# Patient Record
Sex: FEMALE | Race: WHITE | NOT HISPANIC OR LATINO | Employment: OTHER | URBAN - METROPOLITAN AREA
[De-identification: names, ages, dates, MRNs, and addresses within clinical notes are randomized per-mention and may not be internally consistent; named-entity substitution may affect disease eponyms.]

---

## 2017-01-23 ENCOUNTER — RADIANT APPOINTMENT (OUTPATIENT)
Dept: GENERAL RADIOLOGY | Facility: CLINIC | Age: 47
End: 2017-01-23
Attending: PEDIATRICS
Payer: COMMERCIAL

## 2017-01-23 ENCOUNTER — OFFICE VISIT (OUTPATIENT)
Dept: ORTHOPEDICS | Facility: CLINIC | Age: 47
End: 2017-01-23
Payer: COMMERCIAL

## 2017-01-23 VITALS
WEIGHT: 149 LBS | SYSTOLIC BLOOD PRESSURE: 122 MMHG | DIASTOLIC BLOOD PRESSURE: 66 MMHG | HEIGHT: 69 IN | BODY MASS INDEX: 22.07 KG/M2

## 2017-01-23 DIAGNOSIS — S89.91XA RIGHT KNEE INJURY, INITIAL ENCOUNTER: Primary | ICD-10-CM

## 2017-01-23 DIAGNOSIS — S83.411A SPRAIN OF MEDIAL COLLATERAL LIGAMENT OF RIGHT KNEE, INITIAL ENCOUNTER: ICD-10-CM

## 2017-01-23 DIAGNOSIS — M54.50 ACUTE RIGHT-SIDED LOW BACK PAIN WITHOUT SCIATICA: ICD-10-CM

## 2017-01-23 DIAGNOSIS — S89.91XA RIGHT KNEE INJURY, INITIAL ENCOUNTER: ICD-10-CM

## 2017-01-23 PROCEDURE — 73562 X-RAY EXAM OF KNEE 3: CPT | Mod: RT | Performed by: PEDIATRICS

## 2017-01-23 PROCEDURE — 99214 OFFICE O/P EST MOD 30 MIN: CPT | Performed by: PEDIATRICS

## 2017-01-23 NOTE — PROGRESS NOTES
Sports Medicine Clinic Visit    PCP: Magy Franco    Kalina Schreiber is a 46 year old female who is seen  as a self referral presenting with right knee pain.  Patient fell while skiing 1/19/17.  She is not sure exactly how she fell, but she does know she twisted her right knee.  Pain on the medial aspect of her right knee.  Does feel like her knee is unstable.  Pain with walking.    She does also have right lower back pain.  Does feel like a muscle soreness with twisting.  Pain began on Friday, day after fall skiing.  Denies any pain down her legs.    Points near PSIS. Pain mainly with twist. Soreness with position change, e.g., sit to stand, but once moving not as bad.  Oswestry Disability Index 8.89%    **  Injury: fall while skiing. Was on flat run. Does not really know why she fell.  Marshall an initial burning at knee, medial aspect. Was able to finish run (flat), but then finished for the day.  At rest no pain. Able to walk on it. Notes unable to fully straighten due to pulling medial knee.   With pivot/twist does get some pain, more when on the leg.  Medial knee swelling. No bruising.    Location of Pain: right medial knee  Duration of Pain: 4 day(s)  Rating of Pain at worst: 0/10  Rating of Pain Currently: 0/10  Symptoms are better with: Nothing  Symptoms are worse with: walking, lateral motions  Additional Features:   Positive: instability   Negative: swelling, bruising, popping, grinding, catching, locking, paresthesias, numbness, weakness, pain in other joints and systemic symptoms  Other evaluation and/or treatments so far consists of: Ice  Prior History of related problems: denies    Social History: desk job    Review of Systems  Musculoskeletal: as above  Remainder of review of systems is negative including constitutional, CV, pulmonary, GI, Skin and Neurologic except as noted in HPI or medical history.    Past Medical History   Diagnosis Date     Allergic rhinitis due to other allergen       Solitary cyst of breast      Twin pregnancy, delivered      Past Surgical History   Procedure Laterality Date     Surgical history of -        T&A     Surgical history of -        Csection     Surgical history of -        breast augmentation     Laser surgery of eye  8/21/2008     Both eyes     Colonoscopy  2/23/2012     Procedure:COLONOSCOPY; Colonoscopy  ; Surgeon:BOB TY; Location:[facility]     Colonoscopy N/A 5/6/2016     Procedure: COLONOSCOPY;  Surgeon: Markus Grsosman MD;  Location: [facility]     Esophagoscopy, gastroscopy, duodenoscopy (egd), combined N/A 5/6/2016     Procedure: COMBINED ESOPHAGOSCOPY, GASTROSCOPY, DUODENOSCOPY (EGD);  Surgeon: Markus Grossman MD;  Location: [facility]     Family History   Problem Relation Age of Onset     Alcohol/Drug Mother      CANCER Mother      skin, COD: lung, liver, bone cancer     Melanoma Mother      DIABETES Mother      Post transplant onset     Alcohol/Drug Father      Hypertension Father      CANCER Maternal Grandmother      lung/liver     Asthma Maternal Grandmother      CANCER Maternal Grandfather      skin     Cancer - colorectal Maternal Grandfather      Melanoma Maternal Grandfather      Asthma No family hx of      C.A.D. No family hx of      CEREBROVASCULAR DISEASE No family hx of      Breast Cancer No family hx of      Prostate Cancer No family hx of      Social History     Social History     Marital Status:      Spouse Name: N/A     Number of Children: N/A     Years of Education: N/A     Occupational History      Self     Social History Main Topics     Smoking status: Never Smoker      Smokeless tobacco: Never Used     Alcohol Use: Yes      Comment: 1-2 drinks per week     Drug Use: No     Sexual Activity:     Partners: Male     Birth Control/ Protection: Surgical      Comment: vas     Other Topics Concern     Parent/Sibling W/ Cabg, Mi Or Angioplasty Before 65f 55m? No     Social History Narrative    Currently doing office work     ".    2 kids.      Surrogate for twins       Objective  /66 mmHg  Ht 5' 8.5\" (1.74 m)  Wt 149 lb (67.586 kg)  BMI 22.32 kg/m2    GENERAL APPEARANCE: healthy, alert and no distress   GAIT: antalgic  SKIN: no suspicious lesions or rashes  NEURO: Normal strength and tone, mentation intact and speech normal  PSYCH:  mentation appears normal and affect normal/bright  HEENT: no scleral icterus  CV: no extremity edema  RESP: nonlabored breathing    Right Knee exam    ROM:        Flexion 90 degrees       Extension 5 degrees       Range of motion limited by pain, stiffness  Passive to flexion 110, extension full, pain end of motion    Inspection:       no visible ecchymosis       effusion noted trace to mild    Skin:       no visible deformities       well perfused       capillary refill brisk    Patellar Motion:        Normal patellar tracking noted through range of motion    Tender:        along MCL    Non Tender:         remainder of knee area    Special Tests:        neg (-) Adriane       neg (-) Lachman       neg (-) anterior drawer       neg (-) posterior drawer       neg (-) varus       positive (+) valgus at 30 for laxity, mild pain; mild positive at 0 deg for laxity, with pain       + medial pain with forced extension    Evaluation of ipsilateral kinetic chain  Able to raise against gravity without pain or problem        Low Back:  Inspection: There is no visible deformity in the back.  Range of Motion: Flexion ; extension ; lateral bending right , left ; rotation right , left grossly full   Mild right pain with right lateral bending, left lateral bending  Palpation: There is no tenderness over the midline, mild right tenderness over the paraspinous muscles; + tender right SI joint.  Strength:  Grossly symmetric strength with hip flexion, knee extension, ankle dorsiflexion and plantarflexion as well as dorsiflexion of the great toe.  Sensation: grossly intact throughout lower extremities  Straight " Leg Raise: Right neg; Left neg  Normal skin, vascular and lymphatic exam.         Radiology  Visualized radiographs of right knee obtained today, and reviewed the images with the patient.  Impression: Bilateral Aldana and axial views of the knees demonstrate no acute osseous abnormality. Joint spaces appear preserved.  Visualized radiographs of right knee obtained today, and reviewed the images with the patient.  Impression: Single lateral view of the right knee demonstrates no acute osseous abnormality.      Assessment:  1. Right knee injury, initial encounter    2. Sprain of medial collateral ligament of right knee, initial encounter    3. Acute right-sided low back pain without sciatica    right low back pain favor strain, vs possible SI joint source.  MCL at least grade 2.    Plan:  Discussed the assessment with the patient and her . Isolated MCL sprains generally heal on their own over time.  We discussed the following treatment options: symptom treatment, activity modification/rest, imaging, rehab, potential for improvement with time and support for the affected area. Following discussion, plan:  Topical Treatments: Ice  Over the counter medication: Patient's preferred OTC medication as directed on packaging. May use NSAIDs.  Plain films of the knee reviewed. Discussed potential for MRI of knee, but with isolated MCL sprain, not required at this time.  Activity Modification: discussed  Rehab: Home Exercise Program for back, stretching reviewed; also with ROM for knee  Physical Therapy: encouraged for knee, though she prefers HEP to start  Medical Equipment: discussed bracing, hinge brace for support; she has a hinge brace at home already, prefers to use that; may consider crutches for ambulation if desired, not required  Follow up: 3-4 weeks, sooner prn.  Questions answered. The patient indicates understanding of these issues and agrees with the plan.    Rodney Beauchamp DO,  [facility]            Disclaimer: This note consists of symbols derived from keyboarding, dictation and/or voice recognition software. As a result, there may be errors in the script that have gone undetected. Please consider this when interpreting information found in this chart.

## 2017-01-23 NOTE — Clinical Note
Kalina HARTMANN was seen in [facility] for her MCL sprain. Bracing, rehab. Please see copy of the chart note for additional details. Thanks.

## 2017-01-23 NOTE — NURSING NOTE
"Chief Complaint   Patient presents with     Musculoskeletal Problem     right knee pain       Initial /66 mmHg  Ht 5' 8.5\" (1.74 m)  Wt 149 lb (67.586 kg)  BMI 22.32 kg/m2 Estimated body mass index is 22.32 kg/(m^2) as calculated from the following:    Height as of this encounter: 5' 8.5\" (1.74 m).    Weight as of this encounter: 149 lb (67.586 kg).  BP completed using cuff size: regular    Patient was given home exercise program at the direction of TACHO GARIBAY  that included the following exercise(s) :    Exercise(s) knee slides, AAROM seated flexion, quad sets, TKE quad sets/ cat camel, knee to chest, knees to chest, rotation    Repititions: 5    Times per day: 1-2    Patient demonstrated understanding of and the ability to perform these exercises. Patient was seen for 5 minutes to provide this home exercise program. Patient was directed to discontinue any exercises that cause pain, and to call the clinic if any questions.    "

## 2017-01-26 ENCOUNTER — RADIANT APPOINTMENT (OUTPATIENT)
Dept: GENERAL RADIOLOGY | Facility: CLINIC | Age: 47
End: 2017-01-26
Attending: FAMILY MEDICINE
Payer: COMMERCIAL

## 2017-01-26 ENCOUNTER — OFFICE VISIT (OUTPATIENT)
Dept: ORTHOPEDICS | Facility: CLINIC | Age: 47
End: 2017-01-26
Payer: COMMERCIAL

## 2017-01-26 VITALS
DIASTOLIC BLOOD PRESSURE: 72 MMHG | BODY MASS INDEX: 22.07 KG/M2 | SYSTOLIC BLOOD PRESSURE: 122 MMHG | WEIGHT: 149 LBS | HEART RATE: 87 BPM | HEIGHT: 69 IN

## 2017-01-26 DIAGNOSIS — S92.355A CLOSED NONDISPLACED FRACTURE OF FIFTH METATARSAL BONE OF LEFT FOOT, INITIAL ENCOUNTER: Primary | ICD-10-CM

## 2017-01-26 DIAGNOSIS — S99.922A INJURY OF LEFT FOOT, INITIAL ENCOUNTER: ICD-10-CM

## 2017-01-26 PROCEDURE — 99214 OFFICE O/P EST MOD 30 MIN: CPT | Performed by: FAMILY MEDICINE

## 2017-01-26 PROCEDURE — 73630 X-RAY EXAM OF FOOT: CPT | Mod: LT

## 2017-01-26 NOTE — PROGRESS NOTES
"Kalina Schreiber  :  1970  DOS: 2017  MRN: [id number]    Sports Medicine Clinic Visit    PCP: Magy Franco    Kalina Schreiber is a 46 year old female who is seen as AIC patient presenting with left foot injury.  Rolled her foot on vacuum hose when going down 2 stairs, heard a snap.    Injury: this morning.  Pain located over lateral foot, nonradiating.  Additional Features:  Positive: swelling and bruising.  Symptoms are better with Nothing.  Symptoms are worse with: unable to bear much weight.  Other evaluation and/or treatments so far consists of: No Treatment tried to date.  Prior imaging: No recent imaging completed.  Prior History of related problems: none    Social History: desk job    Review of Systems  Musculoskeletal: as above  Remainder of review of systems is negative including constitutional, CV, pulmonary, GI, Skin and Neurologic except as noted in HPI or medical history.    Past Medical History   Diagnosis Date     Allergic rhinitis due to other allergen      Solitary cyst of breast      Twin pregnancy, delivered      Past Surgical History   Procedure Laterality Date     Surgical history of -        T&A     Surgical history of -        Csection     Surgical history of -        breast augmentation     Laser surgery of eye  2008     Both eyes     Colonoscopy  2012     Procedure:COLONOSCOPY; Colonoscopy  ; Surgeon:BOB TY; Location:[facility]     Colonoscopy N/A 2016     Procedure: COLONOSCOPY;  Surgeon: Markus Grossman MD;  Location: [facility]     Esophagoscopy, gastroscopy, duodenoscopy (egd), combined N/A 2016     Procedure: COMBINED ESOPHAGOSCOPY, GASTROSCOPY, DUODENOSCOPY (EGD);  Surgeon: Markus Grossman MD;  Location: [facility]     Objective  /72 mmHg  Pulse 87  Ht 5' 8.5\" (1.74 m)  Wt 149 lb (67.586 kg)  BMI 22.32 kg/m2    General: healthy, alert and in no distress    HEENT: no scleral icterus or conjunctival erythema   Skin: no " suspicious lesions or rash. No jaundice.   CV: regular rhythm by palpation, 2+ distal pulses, no pedal edema    Resp: normal respiratory effort without conversational dyspnea   Psych: normal mood and affect    Gait: antalgic, appropriate coordination and balance   Neuro: normal light touch sensory exam of the extremities. Motor strength as noted below     Left Ankle/Foot Exam:    Inspection:       ecchymosis noted lateral midfoot       edema noted lateral midfoot    Foot inspection:       no deformity noted    ROM:        limited inversion passively       limited eversion actively    Tender:       proximal 5th metatarsal    Non-Tender:       remainder of foot and ankle       lateral malleolus       lateral ankle ligaments       deltoid ligament       posterior edge of lateral malleolus       dorsal tibiotalar joint       tarsal navicular       medial malleolus       distal tibiofibular joint       proximal 1st and 2nd intermetatarsal ligament       tibialis posterior tendon, posterior to medial malleolus    Skin:       well perfused       capillary refill less than 2 seconds    Special Tests:       neg (-) anterior drawer        neg (-) talar tilt     Gait:       Avoids WB    Proprioception:        normal      Radiology:  XR images independently visualized and reviewed with patient today in clinic  Proximal 5th MT styloid avulsion fracture  No other acute findings appreciated, will follow final radiology read    Assessment:  1. Closed nondisplaced fracture of fifth metatarsal bone of left foot, initial encounter        Plan:  Discussed the assessment with the patient.  Reviewed expectations for recovery from fracture, good prognosis  No associated l;ateral ankle sprain appreciated  Will advance HEP next visit if possible  No cast boot needed  Crutches prn over the next one week  F/u next week, consider XR based on clinical progress  Soft dressing and post-op shoe provided, firm soled shoe ok as well  Has cam walker  at home and can use in short term if more comfortable, but will not likely need long term  Advance WBAT  VIRGIL reviewed  Home handouts provided and supportive care reviewed  All questions were answered today  Contact us with additional questions or concerns  Signs and sx of concern reviewed      Rodney Lester DO, CAJOSH  Primary Care Sports Medicine  [facility]

## 2017-02-03 ENCOUNTER — OFFICE VISIT (OUTPATIENT)
Dept: ORTHOPEDICS | Facility: CLINIC | Age: 47
End: 2017-02-03
Payer: COMMERCIAL

## 2017-02-03 ENCOUNTER — RADIANT APPOINTMENT (OUTPATIENT)
Dept: GENERAL RADIOLOGY | Facility: CLINIC | Age: 47
End: 2017-02-03
Attending: FAMILY MEDICINE
Payer: COMMERCIAL

## 2017-02-03 VITALS
HEIGHT: 69 IN | SYSTOLIC BLOOD PRESSURE: 125 MMHG | WEIGHT: 149 LBS | BODY MASS INDEX: 22.07 KG/M2 | DIASTOLIC BLOOD PRESSURE: 60 MMHG

## 2017-02-03 DIAGNOSIS — S92.355D CLOSED NONDISPLACED FRACTURE OF FIFTH METATARSAL BONE OF LEFT FOOT WITH ROUTINE HEALING, SUBSEQUENT ENCOUNTER: ICD-10-CM

## 2017-02-03 DIAGNOSIS — S92.355D CLOSED NONDISPLACED FRACTURE OF FIFTH METATARSAL BONE OF LEFT FOOT WITH ROUTINE HEALING, SUBSEQUENT ENCOUNTER: Primary | ICD-10-CM

## 2017-02-03 PROCEDURE — 99213 OFFICE O/P EST LOW 20 MIN: CPT | Performed by: FAMILY MEDICINE

## 2017-02-03 PROCEDURE — 73630 X-RAY EXAM OF FOOT: CPT | Mod: LT

## 2017-02-03 NOTE — NURSING NOTE
"Initial /60 mmHg  Ht 5' 8.5\" (1.74 m)  Wt 149 lb (67.586 kg)  BMI 22.32 kg/m2 Estimated body mass index is 22.32 kg/(m^2) as calculated from the following:    Height as of this encounter: 5' 8.5\" (1.74 m).    Weight as of this encounter: 149 lb (67.586 kg). .    David Moses ATC  "

## 2017-02-03 NOTE — PROGRESS NOTES
Kalina Schreiber  :  1970  DOS: 2/3/2017  MRN: [id number]    Sports Medicine Clinic Visit    PCP: Magy Franco    Kalina Schreiber is a 46 year old female who is seen as AIC patient presenting with left foot injury.  Rolled her foot on vacuum hose when going down 2 stairs, heard a snap.    Injury: this morning.  Pain located over lateral foot, nonradiating.  Additional Features:  Positive: swelling and bruising.  Symptoms are better with Nothing.  Symptoms are worse with: unable to bear much weight.  Other evaluation and/or treatments so far consists of: No Treatment tried to date.  Prior imaging: No recent imaging completed.  Prior History of related problems: none    Social History: desk job    Interim History February 3, 2017  Kalina Schreiber is now 1 week out from injury.  Since last visit on 2017 patient has decreased pain and swelling.  Has been WB at home without crutches, very mild pain only.  Still using post op shoe for comfort.  R knee MCL sprain also feeling better, was evaluated elseware.  No new sx of concern.    Review of Systems  Musculoskeletal: as above  Remainder of review of systems is negative including constitutional, CV, pulmonary, GI, Skin and Neurologic except as noted in HPI or medical history.    Past Medical History   Diagnosis Date     Allergic rhinitis due to other allergen      Solitary cyst of breast      Twin pregnancy, delivered      Past Surgical History   Procedure Laterality Date     Surgical history of -        T&A     Surgical history of -        Csection     Surgical history of -        breast augmentation     Laser surgery of eye  2008     Both eyes     Colonoscopy  2012     Procedure:COLONOSCOPY; Colonoscopy  ; Surgeon:BOB TY; Location:[facility]     Colonoscopy N/A 2016     Procedure: COLONOSCOPY;  Surgeon: Markus Grossman MD;  Location: [facility]     Esophagoscopy, gastroscopy, duodenoscopy (egd), combined N/A 2016      "Procedure: COMBINED ESOPHAGOSCOPY, GASTROSCOPY, DUODENOSCOPY (EGD);  Surgeon: Markus Grossman MD;  Location: [facility]     Objective  /60 mmHg  Ht 5' 8.5\" (1.74 m)  Wt 149 lb (67.586 kg)  BMI 22.32 kg/m2    General: healthy, alert and in no distress    HEENT: no scleral icterus or conjunctival erythema   Skin: no suspicious lesions or rash. No jaundice.   CV: regular rhythm by palpation, 2+ distal pulses, no pedal edema    Resp: normal respiratory effort without conversational dyspnea   Psych: normal mood and affect    Gait: antalgic, appropriate coordination and balance   Neuro: normal light touch sensory exam of the extremities. Motor strength as noted below     Left Ankle/Foot Exam:    Inspection:       ecchymosis noted lateral midfoot       edema noted lateral midfoot improved    Foot inspection:       no deformity noted    ROM:        limited inversion passively       limited eversion actively    Tender:       proximal 5th metatarsal    Non-Tender:       remainder of foot and ankle       lateral malleolus       lateral ankle ligaments       deltoid ligament       posterior edge of lateral malleolus       dorsal tibiotalar joint       tarsal navicular       medial malleolus       distal tibiofibular joint       proximal 1st and 2nd intermetatarsal ligament       tibialis posterior tendon, posterior to medial malleolus    Skin:       well perfused       capillary refill less than 2 seconds    Special Tests:       neg (-) anterior drawer        neg (-) talar tilt     Gait:       Avoids WB    Proprioception:        normal    Radiology:  Repeat XR images independently visualized and reviewed with patient today in clinic  Proximal 5th MT styloid avulsion fracture without change in position, seems to have even improved displacement/alignment   No other acute findings appreciated, will follow final radiology read    LEFT FOOT THREE OR MORE VIEWS 1/26/2017 9:26 AM       HISTORY: Unspecified injury of left " foot, initial encounter.     COMPARISON: None.                                                                       IMPRESSION: There is an acute appearing intra-articular transverse  fracture at the proximal fifth metatarsal base with about 1.5 mm  medial displacement of the distal fracture fragment and less than 1 mm  articular surface step-off. No other bony abnormalities.    Assessment:  1. Closed nondisplaced fracture of fifth metatarsal bone of left foot with routine healing, subsequent encounter        Plan:  Discussed the assessment with the patient.  Reviewed expectations for recovery from fracture, good prognosis  On target for 4-6 weeks to full healing based on progress so far  No associated lateral ankle sprain appreciated  Will advance HEP, reviewed in detail for foot and knee  No cast boot needed  Crutches prn only, use of one crutch in r arm reviewed  F/u 3 weeks, doubt we would need XR based on clinical progress  Soft dressing and post-op shoe can still be used, firm soled shoe ok as well  Has cam walker at home and can use prn, but has not needed so far  Continue to advance WBAT  VIRGIL reviewed  Supportive care reviewed  All questions were answered today  Contact us with additional questions or concerns  Signs and sx of concern reviewed      Rodney Lester DO, LIZETTE  Primary Care Sports Medicine  [facility]

## 2017-02-23 ENCOUNTER — OFFICE VISIT (OUTPATIENT)
Dept: ORTHOPEDICS | Facility: CLINIC | Age: 47
End: 2017-02-23
Payer: COMMERCIAL

## 2017-02-23 ENCOUNTER — RADIANT APPOINTMENT (OUTPATIENT)
Dept: GENERAL RADIOLOGY | Facility: CLINIC | Age: 47
End: 2017-02-23
Attending: PHYSICIAN ASSISTANT
Payer: COMMERCIAL

## 2017-02-23 VITALS
SYSTOLIC BLOOD PRESSURE: 129 MMHG | WEIGHT: 149 LBS | DIASTOLIC BLOOD PRESSURE: 81 MMHG | HEIGHT: 69 IN | HEART RATE: 116 BPM | BODY MASS INDEX: 22.07 KG/M2

## 2017-02-23 DIAGNOSIS — G89.29 CHRONIC LEFT SHOULDER PAIN: ICD-10-CM

## 2017-02-23 DIAGNOSIS — M25.512 CHRONIC LEFT SHOULDER PAIN: Primary | ICD-10-CM

## 2017-02-23 DIAGNOSIS — M25.512 CHRONIC LEFT SHOULDER PAIN: ICD-10-CM

## 2017-02-23 DIAGNOSIS — G89.29 CHRONIC LEFT SHOULDER PAIN: Primary | ICD-10-CM

## 2017-02-23 PROCEDURE — 73030 X-RAY EXAM OF SHOULDER: CPT | Mod: LT

## 2017-02-23 PROCEDURE — 99213 OFFICE O/P EST LOW 20 MIN: CPT | Performed by: PHYSICIAN ASSISTANT

## 2017-02-23 NOTE — NURSING NOTE
"Chief Complaint   Patient presents with     Shoulder left       Initial /81  Pulse 116  Ht 5' 8.5\" (1.74 m)  Wt 149 lb (67.6 kg)  BMI 22.33 kg/m2 Estimated body mass index is 22.33 kg/(m^2) as calculated from the following:    Height as of this encounter: 5' 8.5\" (1.74 m).    Weight as of this encounter: 149 lb (67.6 kg).  Medication Reconciliation: complete    "

## 2017-02-23 NOTE — MR AVS SNAPSHOT
After Visit Summary   2/23/2017    Kalina Schreiber    MRN: [id number]           Patient Information     Date Of Birth          1970        Visit Information        Provider Department      2/23/2017 11:40 AM Megan Nj PA-C [facility] [place]        Today's Diagnoses     Chronic left shoulder pain    -  1      Care Instructions    Plan:  Observe and monitor symptoms  Rehab: Physical Therapy: [facility] - [phone]  Follow up: 6 weeks as needed.  Please call sooner if interested in intra-articular injection.    If you have any further questions for your physician or physician s care team you can call [phone] and use option 3 to leave a voice message. Calls received during business hours will be returned same day.          Follow-ups after your visit        Additional Services     [facility] PT, HAND, AND CHIROPRACTIC REFERRAL       **This order will print in the [facility] Scheduling Office**    Physical Therapy, Hand Therapy and Chiropractic Care are available through:    *[facility]  *[facility]  *[facility]    Call one number to schedule at any of the above locations: [phone].    Your provider has referred you to: Physical Therapy at [facility] or [facility]    Indication/Reason for Referral: Shoulder Pain  Onset of Illness: chronic  Therapy Orders: Evaluate and Treat  Special Programs: None  Special Request: shoulder mobilization, strengthening    Susan Davis      Additional Comments for the Therapist or Chiropractor: labral pathology    Please be aware that coverage of these services is subject to the terms and limitations of your health insurance plan.  Call member services at your health plan with any benefit or coverage questions.      Please bring the following to your appointment:    *Your personal calendar for scheduling future appointments  *Comfortable clothing                  Who  "to contact     If you have questions or need follow up information about today's clinic visit or your schedule please contact [facility] directly at [phone].  Normal or non-critical lab and imaging results will be communicated to you by MyChart, letter or phone within 4 business days after the clinic has received the results. If you do not hear from us within 7 days, please contact the clinic through MyChart or phone. If you have a critical or abnormal lab result, we will notify you by phone as soon as possible.  Submit refill requests through CRMnext or call your pharmacy and they will forward the refill request to us. Please allow 3 business days for your refill to be completed.          Additional Information About Your Visit        CRMnext Information     CRMnext gives you secure access to your electronic health record. If you see a primary care provider, you can also send messages to your care team and make appointments. If you have questions, please call your primary care clinic.  If you do not have a primary care provider, please call [phone] and they will assist you.        Care EveryWhere ID     This is your Care EveryWhere ID. This could be used by other organizations to access your [facility] medical records  [id number]        Your Vitals Were     Pulse Height BMI (Body Mass Index)             116 5' 8.5\" (1.74 m) 22.33 kg/m2          Blood Pressure from Last 3 Encounters:   02/23/17 129/81   02/03/17 125/60   01/26/17 122/72    Weight from Last 3 Encounters:   02/23/17 149 lb (67.6 kg)   02/03/17 149 lb (67.6 kg)   01/26/17 149 lb (67.6 kg)              We Performed the Following     [facility] PT, HAND, AND CHIROPRACTIC REFERRAL        Primary Care Provider Office Phone # Fax #    Magy PENNY Lang [facility] [phone]       [facility] [address] [address]        Thank you!     Thank you for choosing [email or web address]  for your care. Our goal is always to provide you with excellent care. Hearing back from our patients is one way we can continue to improve our services. Please take a few minutes to complete the written survey that you may receive in the mail after your visit with us. Thank you!             Your Updated Medication List - Protect others around you: Learn how to safely use, store and throw away your medicines at www.disposemymeds.org.          This list is accurate as of: 2/23/17 12:32 PM.  Always use your most recent med list.                   Brand Name Dispense Instructions for use    ADVIL 200 MG capsule   Generic drug:  ibuprofen      Take 200 mg by mouth every 4 hours as needed.       ferrous sulfate 325 (65 FE) MG tablet    IRON    180 tablet    Take 1 tablet (325 mg) by mouth 2 times daily       fexofenadine 180 MG tablet    ALLEGRA    30 tablet    Take 1 tablet (180 mg) by mouth daily       NASAL SPRAY NA      Nasoquort 1 spray in each nostril daily when needed       order for DME     1 Device    Equipment being ordered: Cast shoe       SUMAtriptan 25 MG tablet    IMITREX    18 tablet    Take 1-2 tablets (25-50 mg) by mouth at onset of headache for migraine May repeat dose in 2 hours.  Do not exceed 200 mg in 24 hours       UNKNOWN TO PATIENT      Allergy Eye Drops, 1 drop in each eye daily

## 2017-02-23 NOTE — PATIENT INSTRUCTIONS
Plan:  Observe and monitor symptoms  Rehab: Physical Therapy: [facility] - [phone]  Follow up: 6 weeks as needed.  Please call sooner if interested in intra-articular injection.    If you have any further questions for your physician or physician s care team you can call [phone] and use option 3 to leave a voice message. Calls received during business hours will be returned same day.

## 2017-02-23 NOTE — PROGRESS NOTES
Sports Medicine Clinic Visit    PCP: Magy Franco    Kalina Schreiber is a 46 year old female who is seen  as a self referral presenting with left shoulder pain.  Right hand dominant.    Injury: over 1 year, no injury    Location of Pain: deep posterior  Duration of Pain: 1+ years   Rating of Pain at worst: 8/10  Rating of Pain Currently:0/10  Symptoms are better with: activity avoidance  Symptoms are worse with: lifting things away from body, reaching up  Additional Features:   Positive: none  Other evaluation and/or treatments so far consists of: none  Prior History of related problems: chronic    Social History: desk job    Review of Systems  Musculoskeletal: as above  Remainder of review of systems is negative including constitutional, CV, pulmonary, GI, Skin and Neurologic except as noted in HPI or medical history.    Family history, medical history and surgical history have all been discussed with patient and appended to medical chart below.    Past Medical History   Diagnosis Date     Allergic rhinitis due to other allergen      Solitary cyst of breast      Twin pregnancy, delivered      Past Surgical History   Procedure Laterality Date     Surgical history of -        T&A     Surgical history of -        Csection     Surgical history of -        breast augmentation     Laser surgery of eye  8/21/2008     Both eyes     Colonoscopy  2/23/2012     Procedure:COLONOSCOPY; Colonoscopy  ; Surgeon:BOB TY; Location:[facility]     Colonoscopy N/A 5/6/2016     Procedure: COLONOSCOPY;  Surgeon: Markus Grossman MD;  Location: [facility]     Esophagoscopy, gastroscopy, duodenoscopy (egd), combined N/A 5/6/2016     Procedure: COMBINED ESOPHAGOSCOPY, GASTROSCOPY, DUODENOSCOPY (EGD);  Surgeon: Markus Grossman MD;  Location: [facility]     Family History   Problem Relation Age of Onset     Alcohol/Drug Mother      CANCER Mother      skin, COD: lung, liver, bone cancer     Melanoma Mother      DIABETES  "Mother      Post transplant onset     Alcohol/Drug Father      Hypertension Father      CANCER Maternal Grandmother      lung/liver     Asthma Maternal Grandmother      CANCER Maternal Grandfather      skin     Cancer - colorectal Maternal Grandfather      Melanoma Maternal Grandfather      Asthma No family hx of      C.A.D. No family hx of      CEREBROVASCULAR DISEASE No family hx of      Breast Cancer No family hx of      Prostate Cancer No family hx of      Objective  /81  Pulse 116  Ht 5' 8.5\" (1.74 m)  Wt 149 lb (67.6 kg)  BMI 22.33 kg/m2  Constitutional:well-developed, well-nourished, and in no distress.   Cardiovascular: Intact distal pulses.    Neurological: alert. Gait normal  Skin: Skin is warm and dry.   Psychiatric: Mood and affect normal.   Respiratory: unlabored, speaks in full sentences  Hematologic/Lymphatic/Immunologic: neg lymphadenopathy, neg lymphedema    Exam:  Left Shoulder exam    ROM:        forward flexion 120        abduction 160       internal rotation lumbar spine upper       external rotation 60+ bilaterally    Tender:        posterior shoulder - joint line    Non Tender:       remainder of shoulder    Strength:        abduction 5/5       internal rotation 5/5       external rotation 5/5       adduction 5/5    Impingement testing:        neg (-) Neer       neg (-) Mcdonnell       neg (-) empty can       Positive Speed's test for labral pathology    Stability testing:       neg (-) posterior compression    Skin:        no visible deformities       well perfused       capillary refill brisk    Sensation:        normal sensation over shoulder and upper extremity         Radiology:  Study Result   XR SHOULDER LT G/E 3 VW 2/23/2017 11:50 AM      HISTORY: Pain in left shoulder, Other chronic pain         IMPRESSION: Negative exam.     MILTON HARRINGTON MD       I have personally reviewed images with patient    Assessment:  1. Chronic left shoulder pain    2.  Possible labral " pathology    Plan:  Discussed the assessment with the patient.  Topical Treatments: Ice, Heat or Topical Analgesics  Over the counter medication: Acetaminophen (Tylenol) 1000mg every 6 hours with food (Maximum of 3000mg/day)  Naproxen (Aleve) maximum of 440mg two times a day with food  MR arthrogram may be required in future to further evaluate labrum  Observe and monitor symptoms  Activity Modification: as tolerated  Rehab: Physical Therapy: [facility] - [phone]  Follow up: 6-8 weeks  Apprised patient that if discomfort progresses that she can consider intra-articular steroid injection therapy - she is not interested in scheduling this today            Megan Nj PA-C  [facility]  [facility]      Disclaimer: This note consists of symbols derived from keyboarding, dictation and/or voice recognition software. As a result, there may be errors in the script that have gone undetected. Please consider this when interpreting information found in this chart.

## 2017-02-24 ENCOUNTER — THERAPY VISIT (OUTPATIENT)
Dept: PHYSICAL THERAPY | Facility: CLINIC | Age: 47
End: 2017-02-24
Payer: COMMERCIAL

## 2017-02-24 DIAGNOSIS — M25.512 CHRONIC LEFT SHOULDER PAIN: Primary | ICD-10-CM

## 2017-02-24 DIAGNOSIS — G89.29 CHRONIC LEFT SHOULDER PAIN: Primary | ICD-10-CM

## 2017-02-24 PROCEDURE — 97110 THERAPEUTIC EXERCISES: CPT | Mod: GP | Performed by: PHYSICAL THERAPIST

## 2017-02-24 PROCEDURE — 97161 PT EVAL LOW COMPLEX 20 MIN: CPT | Mod: GP | Performed by: PHYSICAL THERAPIST

## 2017-02-24 NOTE — PROGRESS NOTES
"[facility] Initial Evaluation      Subjective:    Kalina Schreiber is a 46 year old female with a right shoulder condition.  Condition occurred with:  Unknown cause.  Condition occurred: for unknown reasons.    Pt states has been dealing with pain for a year or more. Noticed discomfort reaching overhead.  Took a break from weight lifting and didn't notice any difference while not being active and felt worse upon return.  Referred to PT 02/23/2017.    Patient reports pain:  In the joint and posterior.    Pain is described as aching and is intermittent and reported as 8/10.   Worse during: activity dependent, not time dependent.  Exacerbated by: planking, donning jacket, lifting, reaching overhead. Relieved by: avoiding the above.  Since onset symptoms are gradually worsening.  Special tests:  X-ray (in chart).      General health as reported by patient is excellent.  Pertinent medical history includes:  History of fractures and anemia.  Medical allergies: yes (adhesive).  Other surgeries include:  Other (c sections).  Current medications:  None as reported by the patient.  Current occupation is managerial.  Patient is working in normal job without restrictions.  Primary job tasks include:  Prolonged sitting.    Barriers include:  None as reported by the patient.    Red flags:  None as reported by the patient.    Pt states her goal is to \"be painfree.\"                Objective:    Standing Alignment:    Cervical/Thoracic:  Forward head  Shoulder/UE:  Rounded shoulders (symmetric inferior scapular angle winging B)                                       Shoulder Evaluation:  ROM:  AROM:    Flexion:  Left:  120 positive    Right:  175  Extension: Left: 79 positiveRight: 85  Abduction:  Left: 169 negative   Right:  169    Internal Rotation:  Left:  T4 negative    Right:  T7  External Rotation:  Left:  79 positive    Right:  83                PROM:    Flexion:  Left:  175 negative      "                               Strength:    Flexion: Left:4/5    Pain: +    Right: 5/5      Pain:  -  Extension:  Left: 4+/5     Pain:-    Right: 5/5     Pain:-  Abduction:  Left: 4+/5   Pain:-    Right: 5/5      Pain:-  Adduction:  Left: 5/5     Pain:-    Right: 5/5      Pain:-  Internal Rotation:  Left:4+/5      Pain:-    Right: 5/5      Pain:-  External Rotation:   Left:4+/5      Pain:+   Right:5/5      Pain:-      Horizontal Adduction:  Right:/5     Pain:-  Elbow Flexion:  Left:5/5      Pain:-    Right:5/5      Pain:-  Elbow Extension:  Left:5/5      Pain:-    Right:5/5      Pain:-  Stability Testing:      Left shoulder stability negative testing:  Apprehension; Relocation; Load and shift anterior and Load and shift posterior    Right shoulder stability negative testing:  Apprehension; Relocation; Load and shift anterior and Load and shift posterior  Special Tests:    Left shoulder positive for the following special tests:  Labral (positive active compression in position 1 but not 2)  Left shoulder negative for the following special tests:  Impingement and Rotator cuff tear    Right shoulder negative for the following special tests:Labral and Impingement  Palpation:  Palpation assessed shoulder: tender to palpation only over posterior joint line L.      Mobility Tests:    Glenohumeral anterior left:  Hypermobile  Glenohumeral anterior right:  Hypermobile  Glenohumeral posterior left:  Hypermobile  Glenohumeral posterior right:  Hypermobile  Glenohumeral inferior left:  Hypermobile  Glenohumeral inferior right:  Hypermobile      Scapulothoracic left:  Hypermobile  Scapulothoracic right:  Hypermobile                                       General     ROS    Assessment/Plan:      Patient is a 46 year old female with left side shoulder complaints.    Patient has the following significant findings with corresponding treatment plan.                Diagnosis 1:  L shoulder pain with underlying hypermobility  Pain -   hot/cold therapy  Decreased strength - therapeutic exercise and therapeutic activities  Impaired muscle performance - neuro re-education  Decreased function - therapeutic activities  Impaired posture - neuro re-education    Therapy Evaluation Codes:   1) History comprised of:   Personal factors that impact the plan of care:      Time since onset of symptoms.    Comorbidity factors that impact the plan of care are:      None.     Medications impacting care: None.  2) Examination of Body Systems comprised of:   Body structures and functions that impact the plan of care:      Shoulder.   Activity limitations that impact the plan of care are:      Dressing and Lifting.  3) Clinical presentation characteristics are:   Stable/Uncomplicated.  4) Decision-Making    Low complexity using standardized patient assessment instrument and/or measureable assessment of functional outcome.  Cumulative Therapy Evaluation is: Low complexity.    Previous and current functional limitations:  (See Goal Flow Sheet for this information)    Short term and Long term goals: (See Goal Flow Sheet for this information)     Communication ability:  Patient appears to be able to clearly communicate and understand verbal and written communication and follow directions correctly.  Treatment Explanation - The following has been discussed with the patient:   RX ordered/plan of care  Anticipated outcomes  Possible risks and side effects  This patient would benefit from PT intervention to resume normal activities.   Rehab potential is good.    Frequency:  2 X a month, once daily  Duration:  for 2 months  Discharge Plan:  Achieve all LTG.  Independent in home treatment program.  Reach maximal therapeutic benefit.    Please refer to the daily flowsheet for treatment today, total treatment time and time spent performing 1:1 timed codes.

## 2017-02-24 NOTE — MR AVS SNAPSHOT
After Visit Summary   2/24/2017    Kalina Schreiber    MRN: [id number]           Patient Information     Date Of Birth          1970        Visit Information        Provider Department      2/24/2017 10:45 AM Malcolm Rosen PT [facility] [place] [facility]        Today's Diagnoses     Chronic left shoulder pain    -  1       Follow-ups after your visit        Your next 10 appointments already scheduled     Mar 10, 2017 10:45 AM CST   [facility] Extremity with Malcolm Rosen PT   [facility] [place] [facility] ([facility] [place])    [address]  [address]  [place] [id number] [phone]              Who to contact     If you have questions or need follow up information about today's clinic visit or your schedule please contact [facility] [place] [facility] directly at [phone].  Normal or non-critical lab and imaging results will be communicated to you by CardFlighthart, letter or phone within 4 business days after the clinic has received the results. If you do not hear from us within 7 days, please contact the clinic through CardFlighthart or phone. If you have a critical or abnormal lab result, we will notify you by phone as soon as possible.  Submit refill requests through Xeebel or call your pharmacy and they will forward the refill request to us. Please allow 3 business days for your refill to be completed.          Additional Information About Your Visit        MyChart Information     Xeebel gives you secure access to your electronic health record. If you see a primary care provider, you can also send messages to your care team and make appointments. If you have questions, please call your primary care clinic.  If you do not have a primary care provider, please call [phone] and they will assist you.        Care EveryWhere ID     This is your Care EveryWhere ID. This could be used by other organizations to access your [facility]  records  [id number]         Blood Pressure from Last 3 Encounters:   02/23/17 129/81   02/03/17 125/60   01/26/17 122/72    Weight from Last 3 Encounters:   02/23/17 67.6 kg (149 lb)   02/03/17 67.6 kg (149 lb)   01/26/17 67.6 kg (149 lb)              We Performed the Following     PT Eval, Low Complexity (81631)     Therapeutic Exercises        Primary Care Provider Office Phone # Fax #    Magy Plata PENNY Garcia [facility] [phone]       [facility] [address] [address]        Thank you!     Thank you for choosing [facility]  for your care. Our goal is always to provide you with excellent care. Hearing back from our patients is one way we can continue to improve our services. Please take a few minutes to complete the written survey that you may receive in the mail after your visit with us. Thank you!             Your Updated Medication List - Protect others around you: Learn how to safely use, store and throw away your medicines at www.disposemymeds.org.          This list is accurate as of: 2/24/17 11:49 AM.  Always use your most recent med list.                   Brand Name Dispense Instructions for use    ADVIL 200 MG capsule   Generic drug:  ibuprofen      Take 200 mg by mouth every 4 hours as needed.       ferrous sulfate 325 (65 FE) MG tablet    IRON    180 tablet    Take 1 tablet (325 mg) by mouth 2 times daily       fexofenadine 180 MG tablet    ALLEGRA    30 tablet    Take 1 tablet (180 mg) by mouth daily       NASAL SPRAY NA      Nasoquort 1 spray in each nostril daily when needed       order for DME     1 Device    Equipment being ordered: Cast shoe       SUMAtriptan 25 MG tablet    IMITREX    18 tablet    Take 1-2 tablets (25-50 mg) by mouth at onset of headache for migraine May repeat dose in 2 hours.  Do not exceed 200 mg in 24 hours       UNKNOWN TO PATIENT      Allergy Eye Drops, 1 drop in each eye daily

## 2017-03-10 ENCOUNTER — THERAPY VISIT (OUTPATIENT)
Dept: PHYSICAL THERAPY | Facility: CLINIC | Age: 47
End: 2017-03-10
Payer: COMMERCIAL

## 2017-03-10 DIAGNOSIS — M25.512 CHRONIC LEFT SHOULDER PAIN: ICD-10-CM

## 2017-03-10 DIAGNOSIS — G89.29 CHRONIC LEFT SHOULDER PAIN: ICD-10-CM

## 2017-03-10 PROCEDURE — 97112 NEUROMUSCULAR REEDUCATION: CPT | Mod: GP | Performed by: PHYSICAL THERAPIST

## 2017-03-10 PROCEDURE — 97110 THERAPEUTIC EXERCISES: CPT | Mod: GP | Performed by: PHYSICAL THERAPIST

## 2017-03-10 NOTE — PROGRESS NOTES
Subjective:    HPI                    Objective:    System    Physical Exam    General     ROS    Assessment/Plan:      SUBJECTIVE  Subjective: Pt reports is still sore and HEP is quite challenging.  Is surely not worse than when last seen.   Current Pain level: 6/10   Changes in function:  Yes (See Goal flowsheet attached for changes in current functional level)     Adverse reaction to treatment or activity:  None    OBJECTIVE  Objective: AROM L shoulder flexion 132, abduction 170, ER 82, IR T4; only discomfort with flexion.  No discomfort PROM L shoulder flexion 174.     ASSESSMENT  Kalina continues to require intervention to meet STG and LTG's: PT  Patient is progressing as expected.  Response to therapy has shown an improvement in  ROM   Progress made towards STG/LTG?  Yes (See Goal flowsheet attached for updates on achievement of STG and LTG)    PLAN  Current treatment program is being advanced to more complex exercises.    PTA/ATC plan:  N/A    Please refer to the daily flowsheet for treatment today, total treatment time and time spent performing 1:1 timed codes.

## 2017-03-10 NOTE — MR AVS SNAPSHOT
After Visit Summary   3/10/2017    Kalina Schreiber    MRN: [id number]           Patient Information     Date Of Birth          1970        Visit Information        Provider Department      3/10/2017 10:45 AM Malcolm Rosen PT [facility] [place] [facility]        Today's Diagnoses     Chronic left shoulder pain           Follow-ups after your visit        Your next 10 appointments already scheduled     Mar 31, 2017 10:05 AM CDT   NAYELY Extremity with Malcolm Rosen PT   [facility] [place] [facility] ([facility] [place])    [address]  [address]  [place] [id number] [phone]              Who to contact     If you have questions or need follow up information about today's clinic visit or your schedule please contact [facility] directly at [phone].  Normal or non-critical lab and imaging results will be communicated to you by Kindfulhart, letter or phone within 4 business days after the clinic has received the results. If you do not hear from us within 7 days, please contact the clinic through Kindfulhart or phone. If you have a critical or abnormal lab result, we will notify you by phone as soon as possible.  Submit refill requests through [email or web address]¡ or call your pharmacy and they will forward the refill request to us. Please allow 3 business days for your refill to be completed.          Additional Information About Your Visit        MyChart Information     [email or web address] gives you secure access to your electronic health record. If you see a primary care provider, you can also send messages to your care team and make appointments. If you have questions, please call your primary care clinic.  If you do not have a primary care provider, please call [phone] and they will assist you.        Care EveryWhere ID     This is your Care EveryWhere ID. This could be used by other organizations to access your [facility]  records  [id number]         Blood Pressure from Last 3 Encounters:   02/23/17 129/81   02/03/17 125/60   01/26/17 122/72    Weight from Last 3 Encounters:   02/23/17 67.6 kg (149 lb)   02/03/17 67.6 kg (149 lb)   01/26/17 67.6 kg (149 lb)              We Performed the Following     Neuromuscular Re-Education     Therapeutic Exercises        Primary Care Provider Office Phone # Fax #    Magy Plata PENNY Garcia [phone]       [facility] [address] [address]        Thank you!     Thank you for choosing [facility]   for your care. Our goal is always to provide you with excellent care. Hearing back from our patients is one way we can continue to improve our services. Please take a few minutes to complete the written survey that you may receive in the mail after your visit with us. Thank you!             Your Updated Medication List - Protect others around you: Learn how to safely use, store and throw away your medicines at www.disposemymeds.org.          This list is accurate as of: 3/10/17 11:43 AM.  Always use your most recent med list.                   Brand Name Dispense Instructions for use    ADVIL 200 MG capsule   Generic drug:  ibuprofen      Take 200 mg by mouth every 4 hours as needed.       ferrous sulfate 325 (65 FE) MG tablet    IRON    180 tablet    Take 1 tablet (325 mg) by mouth 2 times daily       fexofenadine 180 MG tablet    ALLEGRA    30 tablet    Take 1 tablet (180 mg) by mouth daily       NASAL SPRAY NA      Nasoquort 1 spray in each nostril daily when needed       order for DME     1 Device    Equipment being ordered: Cast shoe       SUMAtriptan 25 MG tablet    IMITREX    18 tablet    Take 1-2 tablets (25-50 mg) by mouth at onset of headache for migraine May repeat dose in 2 hours.  Do not exceed 200 mg in 24 hours       UNKNOWN TO PATIENT      Allergy Eye Drops, 1 drop in each eye daily

## 2017-03-31 ENCOUNTER — THERAPY VISIT (OUTPATIENT)
Dept: PHYSICAL THERAPY | Facility: CLINIC | Age: 47
End: 2017-03-31
Payer: COMMERCIAL

## 2017-03-31 DIAGNOSIS — M25.512 CHRONIC LEFT SHOULDER PAIN: ICD-10-CM

## 2017-03-31 DIAGNOSIS — G89.29 CHRONIC LEFT SHOULDER PAIN: ICD-10-CM

## 2017-03-31 PROCEDURE — 97110 THERAPEUTIC EXERCISES: CPT | Mod: GP | Performed by: PHYSICAL THERAPIST

## 2017-03-31 PROCEDURE — 97140 MANUAL THERAPY 1/> REGIONS: CPT | Mod: GP | Performed by: PHYSICAL THERAPIST

## 2017-03-31 PROCEDURE — 97112 NEUROMUSCULAR REEDUCATION: CPT | Mod: GP | Performed by: PHYSICAL THERAPIST

## 2017-03-31 NOTE — MR AVS SNAPSHOT
After Visit Summary   3/31/2017    Kalina Schreiber    MRN: [id number]           Patient Information     Date Of Birth          1970        Visit Information        Provider Department      3/31/2017 10:05 AM Malcolm Rosen, PT [facility] [place] [facility]        Today's Diagnoses     Chronic left shoulder pain           Follow-ups after your visit        Your next 10 appointments already scheduled     Apr 07, 2017 12:40 PM CDT   PHYSICAL with PENNY David CNP   [facility]    [address] [address]   [phone]            Apr 21, 2017 11:25 AM CDT   NAYELY Extremity with Malcolm Rosen PT   [facility] [place] [facility] ([facility] [place])    [address]  [address]  [place] [address] [phone]              Who to contact     If you have questions or need follow up information about today's clinic visit or your schedule please contact [facility] directly at [phone].  Normal or non-critical lab and imaging results will be communicated to you by TrewCaphart, letter or phone within 4 business days after the clinic has received the results. If you do not hear from us within 7 days, please contact the clinic through XIFINt or phone. If you have a critical or abnormal lab result, we will notify you by phone as soon as possible.  Submit refill requests through Chattering Pixels or call your pharmacy and they will forward the refill request to us. Please allow 3 business days for your refill to be completed.          Additional Information About Your Visit        TrewCaphart Information     Chattering Pixels gives you secure access to your electronic health record. If you see a primary care provider, you can also send messages to your care team and make appointments. If you have questions, please call your primary care clinic.  If you do not have a primary care  provider, please call [phone] and they will assist you.        Care EveryWhere ID     This is your Care EveryWhere ID. This could be used by other organizations to access your [place] medical records  [id number]         Blood Pressure from Last 3 Encounters:   02/23/17 129/81   02/03/17 125/60   01/26/17 122/72    Weight from Last 3 Encounters:   02/23/17 67.6 kg (149 lb)   02/03/17 67.6 kg (149 lb)   01/26/17 67.6 kg (149 lb)              We Performed the Following     Manual Ther Tech, 1+Regions, EA 15 min     Neuromuscular Re-Education     Therapeutic Exercises        Primary Care Provider Office Phone # Fax #    Magy PENNY [facility] [phone]       [facility] [address] [id number]        Thank you!     Thank you for choosing [facility]   for your care. Our goal is always to provide you with excellent care. Hearing back from our patients is one way we can continue to improve our services. Please take a few minutes to complete the written survey that you may receive in the mail after your visit with us. Thank you!             Your Updated Medication List - Protect others around you: Learn how to safely use, store and throw away your medicines at www.disposemymeds.org.          This list is accurate as of: 3/31/17 11:51 AM.  Always use your most recent med list.                   Brand Name Dispense Instructions for use    ADVIL 200 MG capsule   Generic drug:  ibuprofen      Take 200 mg by mouth every 4 hours as needed.       ferrous sulfate 325 (65 FE) MG tablet    IRON    180 tablet    Take 1 tablet (325 mg) by mouth 2 times daily       fexofenadine 180 MG tablet    ALLEGRA    30 tablet    Take 1 tablet (180 mg) by mouth daily       NASAL SPRAY NA      Nasoquort 1 spray in each nostril daily when needed       order for DME     1 Device    Equipment being ordered: Cast shoe       SUMAtriptan 25 MG tablet    IMITREX     18 tablet    Take 1-2 tablets (25-50 mg) by mouth at onset of headache for migraine May repeat dose in 2 hours.  Do not exceed 200 mg in 24 hours       UNKNOWN TO PATIENT      Allergy Eye Drops, 1 drop in each eye daily

## 2017-03-31 NOTE — PROGRESS NOTES
Subjective:    HPI                    Objective:    System    Physical Exam    General     ROS    Assessment/Plan:      SUBJECTIVE  Subjective: Pt reports she still notices difficulty raising the arm forward and not sure if is progressing.   Current Pain level: 6/10   Changes in function:  Yes (See Goal flowsheet attached for changes in current functional level)     Adverse reaction to treatment or activity:  None    OBJECTIVE  Objective: AROM / PROM L shoulder flexion 153/170, abduction 168/DNT, ER 75/DNT, IR C7/ DNT.  Discomfort with resisted flexion but not abduction, ER, IR.  Positive active compression test.     ASSESSMENT  Kalina continues to require intervention to meet STG and LTG's: PT  Patient is progressing as expected.  Response to therapy has shown an improvement in  ROM   Progress made towards STG/LTG?  Yes (See Goal flowsheet attached for updates on achievement of STG and LTG)    PLAN  Excellent response to caudal glides in clinic.  Pt to incorporate these into HEP.  F/u as scheduled for reassessment and determination of further course of care.    PTA/ATC plan:  N/A    Please refer to the daily flowsheet for treatment today, total treatment time and time spent performing 1:1 timed codes.

## 2017-04-21 ENCOUNTER — THERAPY VISIT (OUTPATIENT)
Dept: PHYSICAL THERAPY | Facility: CLINIC | Age: 47
End: 2017-04-21
Payer: COMMERCIAL

## 2017-04-21 DIAGNOSIS — G89.29 CHRONIC LEFT SHOULDER PAIN: ICD-10-CM

## 2017-04-21 DIAGNOSIS — M25.512 CHRONIC LEFT SHOULDER PAIN: ICD-10-CM

## 2017-04-21 PROCEDURE — 97110 THERAPEUTIC EXERCISES: CPT | Mod: GP | Performed by: PHYSICAL THERAPIST

## 2017-04-21 NOTE — PROGRESS NOTES
Subjective:    HPI                    Objective:    System    Physical Exam    General     ROS    Assessment/Plan:      PROGRESS  REPORT    Progress reporting period is from 02/24/2017 to today.       SUBJECTIVE  Subjective: Pt reports she doesn't necessarily have the sharp pain as was initially present in PT but feels that a general ache is more prevalent.  Overall still having issues that don't seem to be progressing well.    Current Pain level: 6/10.     Initial Pain level: 8/10.   Changes in function:  Yes (See Goal flowsheet attached for changes in current functional level)  Adverse reaction to treatment or activity: None    OBJECTIVE  Objective: AROM L shoulder flexion 159, abduction 167, ER 83, IR C7.  Positive active compression in both positions.  Negative clunk, crank.     ASSESSMENT/PLAN  Updated problem list and treatment plan: Diagnosis 1:  L shoulder pain -- see plan below  STG/LTGs have been met or progress has been made towards goals:  Yes (See Goal flow sheet completed today.)  Assessment of Progress: The patient's progress has plateaued.  Self Management Plans:  Patient is independent in a home treatment program.  I have re-evaluated this patient and find that the nature, scope, duration and intensity of the therapy is appropriate for the medical condition of the patient.  Kalina continues to require the following intervention to meet STG and LTG's: see plan below    Recommendations:  Pt has made good ROM gains but not necessarily sustained symptomatic gains.  Pt has HEP in place and will continue.  Anticipate f/u with referring provider for further evaluation if pt does not progress further.  Pt agrees further PT on hold at this point.    Please refer to the daily flowsheet for treatment today, total treatment time and time spent performing 1:1 timed codes.

## 2017-04-21 NOTE — MR AVS SNAPSHOT
After Visit Summary   4/21/2017    Kalina Schreiber    MRN: [id number]           Patient Information     Date Of Birth          1970        Visit Information        Provider Department      4/21/2017 11:25 AM Malcolm Rosen PT [facility] [place]        Today's Diagnoses     Chronic left shoulder pain           Follow-ups after your visit        Who to contact     If you have questions or need follow up information about today's clinic visit or your schedule please contact [facility] [place] directly at [phone].  Normal or non-critical lab and imaging results will be communicated to you by Capseohart, letter or phone within 4 business days after the clinic has received the results. If you do not hear from us within 7 days, please contact the clinic through Markkitt or phone. If you have a critical or abnormal lab result, we will notify you by phone as soon as possible.  Submit refill requests through Codbod Technologies or call your pharmacy and they will forward the refill request to us. Please allow 3 business days for your refill to be completed.          Additional Information About Your Visit        MyChart Information     Codbod Technologies gives you secure access to your electronic health record. If you see a primary care provider, you can also send messages to your care team and make appointments. If you have questions, please call your primary care clinic.  If you do not have a primary care provider, please call [phone] and they will assist you.        Care EveryWhere ID     This is your Care EveryWhere ID. This could be used by other organizations to access your [place] medical records  [id number]         Blood Pressure from Last 3 Encounters:   02/23/17 129/81   02/03/17 125/60   01/26/17 122/72    Weight from Last 3 Encounters:   02/23/17 67.6 kg (149 lb)   02/03/17 67.6 kg (149 lb)   01/26/17 67.6 kg (149 lb)              We Performed the Following      Therapeutic Exercises        Primary Care Provider Office Phone # Fax #    PENNY David [phone]       [address] [address]        Thank you!     Thank you for choosing [facility]  for your care. Our goal is always to provide you with excellent care. Hearing back from our patients is one way we can continue to improve our services. Please take a few minutes to complete the written survey that you may receive in the mail after your visit with us. Thank you!             Your Updated Medication List - Protect others around you: Learn how to safely use, store and throw away your medicines at www.disposemymeds.org.          This list is accurate as of: 4/21/17  1:05 PM.  Always use your most recent med list.                   Brand Name Dispense Instructions for use    ADVIL 200 MG capsule   Generic drug:  ibuprofen      Take 200 mg by mouth every 4 hours as needed.       ferrous sulfate 325 (65 FE) MG tablet    IRON    180 tablet    Take 1 tablet (325 mg) by mouth 2 times daily       fexofenadine 180 MG tablet    ALLEGRA    30 tablet    Take 1 tablet (180 mg) by mouth daily       NASAL SPRAY NA      Nasoquort 1 spray in each nostril daily when needed       order for DME     1 Device    Equipment being ordered: Cast shoe       SUMAtriptan 25 MG tablet    IMITREX    18 tablet    Take 1-2 tablets (25-50 mg) by mouth at onset of headache for migraine May repeat dose in 2 hours.  Do not exceed 200 mg in 24 hours       UNKNOWN TO PATIENT      Allergy Eye Drops, 1 drop in each eye daily

## 2017-05-12 ENCOUNTER — TRANSFERRED RECORDS (OUTPATIENT)
Dept: HEALTH INFORMATION MANAGEMENT | Facility: CLINIC | Age: 47
End: 2017-05-12

## 2017-06-20 PROBLEM — M25.512 CHRONIC LEFT SHOULDER PAIN: Status: RESOLVED | Noted: 2017-02-24 | Resolved: 2017-06-20

## 2017-06-20 PROBLEM — G89.29 CHRONIC LEFT SHOULDER PAIN: Status: RESOLVED | Noted: 2017-02-24 | Resolved: 2017-06-20

## 2017-06-21 NOTE — PROGRESS NOTES
See plan 04/21.  Have not heard from pt since.  There is a scanned report from [facility] in chart but appears to be a partial record from 05/12.  Consider PT note dated 04/21 to serve as final summary.

## 2017-08-15 ENCOUNTER — TRANSFERRED RECORDS (OUTPATIENT)
Dept: HEALTH INFORMATION MANAGEMENT | Facility: CLINIC | Age: 47
End: 2017-08-15

## 2017-08-22 ENCOUNTER — TRANSFERRED RECORDS (OUTPATIENT)
Dept: HEALTH INFORMATION MANAGEMENT | Facility: CLINIC | Age: 47
End: 2017-08-22

## 2017-08-24 ENCOUNTER — TRANSFERRED RECORDS (OUTPATIENT)
Dept: HEALTH INFORMATION MANAGEMENT | Facility: CLINIC | Age: 47
End: 2017-08-24

## 2017-08-28 ENCOUNTER — OFFICE VISIT (OUTPATIENT)
Dept: FAMILY MEDICINE | Facility: CLINIC | Age: 47
End: 2017-08-28
Payer: COMMERCIAL

## 2017-08-28 ENCOUNTER — HOSPITAL ENCOUNTER (OUTPATIENT)
Dept: MAMMOGRAPHY | Facility: CLINIC | Age: 47
Discharge: HOME | End: 2017-08-28
Attending: NURSE PRACTITIONER | Admitting: NURSE PRACTITIONER
Payer: COMMERCIAL

## 2017-08-28 VITALS
HEART RATE: 67 BPM | DIASTOLIC BLOOD PRESSURE: 83 MMHG | OXYGEN SATURATION: 98 % | WEIGHT: 144 LBS | BODY MASS INDEX: 20.62 KG/M2 | SYSTOLIC BLOOD PRESSURE: 117 MMHG | HEIGHT: 70 IN

## 2017-08-28 DIAGNOSIS — Z12.31 VISIT FOR SCREENING MAMMOGRAM: ICD-10-CM

## 2017-08-28 DIAGNOSIS — G43.109 MIGRAINE WITH AURA AND WITHOUT STATUS MIGRAINOSUS, NOT INTRACTABLE: ICD-10-CM

## 2017-08-28 DIAGNOSIS — Z01.419 ENCOUNTER FOR GYNECOLOGICAL EXAMINATION WITHOUT ABNORMAL FINDING: Primary | ICD-10-CM

## 2017-08-28 PROCEDURE — G0145 SCR C/V CYTO,THINLAYER,RESCR: HCPCS | Performed by: NURSE PRACTITIONER

## 2017-08-28 PROCEDURE — 87624 HPV HI-RISK TYP POOLED RSLT: CPT | Performed by: NURSE PRACTITIONER

## 2017-08-28 PROCEDURE — G0202 SCR MAMMO BI INCL CAD: HCPCS

## 2017-08-28 PROCEDURE — 99396 PREV VISIT EST AGE 40-64: CPT | Performed by: NURSE PRACTITIONER

## 2017-08-28 RX ORDER — SUMATRIPTAN 25 MG/1
25-50 TABLET, FILM COATED ORAL
Qty: 18 TABLET | Refills: 1 | Status: SHIPPED | OUTPATIENT
Start: 2017-08-28 | End: 2020-12-02

## 2017-08-28 NOTE — NURSING NOTE
"Initial /83 (BP Location: Right arm, Patient Position: Chair, Cuff Size: Adult Regular)  Pulse 67  Ht 5' 9.5\" (1.765 m)  Wt 144 lb (65.3 kg)  SpO2 98%  BMI 20.96 kg/m2 Estimated body mass index is 20.96 kg/(m^2) as calculated from the following:    Height as of this encounter: 5' 9.5\" (1.765 m).    Weight as of this encounter: 144 lb (65.3 kg). .    Estrellita Otero    "

## 2017-08-28 NOTE — PROGRESS NOTES
SUBJECTIVE:   CC: Kalina Schreiber is an 46 year old woman who presents for preventive health visit.     Healthy Habits:    Do you get at least three servings of calcium containing foods daily (dairy, green leafy vegetables, etc.)? yes and no, taking calcium and/or vitamin D supplement: no    Amount of exercise or daily activities, outside of work: 5 day(s) per week    Problems taking medications regularly No    Medication side effects: No    Have you had an eye exam in the past two years? yes    Do you see a dentist twice per year? yes    Do you have sleep apnea, excessive snoring or daytime drowsiness?no        Medication Followup of Imitrex    Taking Medication as prescribed: yes    Side Effects:  None    Medication Helping Symptoms:  yes         Today's PHQ-2 Score:   PHQ-2 ( 1999 [employer]) 8/27/2017 4/4/2017   Q1: Little interest or pleasure in doing things 0 -   Q2: Feeling down, depressed or hopeless 0 -   PHQ-2 Score 0 -   Q1: Little interest or pleasure in doing things Not at all Not at all   Q2: Feeling down, depressed or hopeless Not at all Not at all   PHQ-2 Score 0 0         Abuse: Current or Past(Physical, Sexual or Emotional)- No  Do you feel safe in your environment - Yes  Social History   Substance Use Topics     Smoking status: Never Smoker     Smokeless tobacco: Never Used     Alcohol use Yes      Comment: 1-2 drinks per week     The patient does not drink >3 drinks per day nor >7 drinks per week.    Reviewed orders with patient.  Reviewed health maintenance and updated orders accordingly - Yes          Pertinent mammograms are reviewed under the imaging tab.    History of abnormal Pap smear: YES - updated in Problem List and Health Maintenance accordingly. Once prior to children, all normal since    Reviewed and updated as needed this visit by clinical staff  Meds         Reviewed and updated as needed this visit by Provider              ROS:C: NEGATIVE for fever, chills, change in weight  I:  "NEGATIVE for worrisome rashes, moles or lesions  E: NEGATIVE for vision changes or irritation  ENT: NEGATIVE for ear, mouth and throat problems  R: NEGATIVE for significant cough or SOB  B: NEGATIVE for masses, tenderness or discharge  CV: NEGATIVE for chest pain, palpitations or peripheral edema  GI: NEGATIVE for nausea, abdominal pain, heartburn, or change in bowel habits  : NEGATIVE for unusual urinary or vaginal symptoms. Periods are regular.  M: NEGATIVE for significant arthralgias or myalgia  N: NEGATIVE for weakness, dizziness or paresthesias  P: NEGATIVE for changes in mood or affect    OBJECTIVE:   /83 (BP Location: Right arm, Patient Position: Chair, Cuff Size: Adult Regular)  Pulse 67  Ht 5' 9.5\" (1.765 m)  Wt 144 lb (65.3 kg)  LMP 08/28/2017  SpO2 98%  BMI 20.96 kg/m2  EXAM:  GENERAL: healthy, alert and no distress  EYES: Eyes grossly normal to inspection, PERRL and conjunctivae and sclerae normal  HENT: ear canals and TM's normal, nose and mouth without ulcers or lesions  NECK: no adenopathy, no asymmetry, masses, or scars and thyroid normal to palpation  RESP: lungs clear to auscultation - no rales, rhonchi or wheezes  BREAST: normal without masses, tenderness or nipple discharge and no palpable axillary masses or adenopathy  CV: regular rate and rhythm, normal S1 S2, no S3 or S4, no murmur, click or rub, no peripheral edema and peripheral pulses strong  ABDOMEN: soft, nontender, no hepatosplenomegaly, no masses and bowel sounds normal   (female): normal female external genitalia, normal urethral meatus, vaginal mucosa pink, moist, well rugated, and normal cervix/adnexa/uterus without masses or discharge  MS: no gross musculoskeletal defects noted, no edema  SKIN: no suspicious lesions or rashes  NEURO: Normal strength and tone, mentation intact and speech normal  PSYCH: mentation appears normal, affect normal/bright    ASSESSMENT/PLAN:       ICD-10-CM    1. Encounter for gynecological " "examination without abnormal finding Z01.419 Pap imaged thin layer screen with HPV - recommended age 30 - 65     HPV High Risk Types DNA Cervical   2. Migraine with aura and without status migrainosus, not intractable G43.109 SUMAtriptan (IMITREX) 25 MG tablet       COUNSELING:   Reviewed preventive health counseling, as reflected in patient instructions         reports that she has never smoked. She has never used smokeless tobacco.    Estimated body mass index is 20.96 kg/(m^2) as calculated from the following:    Height as of this encounter: 5' 9.5\" (1.765 m).    Weight as of this encounter: 144 lb (65.3 kg).         The risks, benefits and treatment options of prescribed medications or other treatments have been discussed with the patient. The patient verbalized their understanding and should call or follow up if no improvement or if they develop further problems.    PENNY Calderon [facility]  "

## 2017-08-28 NOTE — MR AVS SNAPSHOT
After Visit Summary   8/28/2017    Kalina Schreiber    MRN: [id number]           Patient Information     Date Of Birth          1970        Visit Information        Provider Department      8/28/2017 1:00 PM Magy Franco APRN [facility]        Today's Diagnoses     Encounter for gynecological examination without abnormal finding    -  1    Migraine with aura and without status migrainosus, not intractable          Care Instructions      Preventive Health Recommendations  Female Ages 40 to 49    Yearly exam:     See your health care provider every year in order to  1. Review health changes.   2. Discuss preventive care.    3. Review your medicines if your doctor prescribed any.      Get a Pap test every three years (unless you have an abnormal result and your provider advises testing more often).      If you get Pap tests with HPV test, you only need to test every 5 years, unless you have an abnormal result. You do not need a Pap test if your uterus was removed (hysterectomy) and you have not had cancer.      You should be tested each year for STDs (sexually transmitted diseases), if you're at risk.       Ask your doctor if you should have a mammogram.      Have a colonoscopy (test for colon cancer) if someone in your family has had colon cancer or polyps before age 50.       Have a cholesterol test every 5 years.       Have a diabetes test (fasting glucose) after age 45. If you are at risk for diabetes, you should have this test every 3 years.    Shots: Get a flu shot each year. Get a tetanus shot every 10 years.     Nutrition:     Eat at least 5 servings of fruits and vegetables each day.    Eat whole-grain bread, whole-wheat pasta and brown rice instead of white grains and rice.    Talk to your provider about Calcium and Vitamin D.     Lifestyle    Exercise at least 150 minutes a week (an average of 30 minutes a day, 5 days a week). This will help you control  "your weight and prevent disease.    Limit alcohol to one drink per day.    No smoking.     Wear sunscreen to prevent skin cancer.    See your dentist every six months for an exam and cleaning.          Follow-ups after your visit        Your next 10 appointments already scheduled     Aug 28, 2017  2:30 PM CDT   MA SCREEN WITH IMPLANTS DIGITAL BILAT with [facility]   [facility] ([facility])    [address] [address] [phone]           Do not use any powder, lotion or deodorant under your arms or on your breast. If you do, we will ask you to remove it before your exam.  Wear comfortable, two-piece clothing.  If you have any allergies, tell your care team.  Bring any previous mammograms from other facilities or have them mailed to the breast center. Three-dimensional (3D) mammograms are available at [facility] locations in [facility], and [place]. [facility] locations include [place] and [facility] in [place]. Benefits of 3D mammograms include: - Improved rate of cancer detection - Decreases your chance of having to go back for more tests, which means fewer: - \"False-positive\" results (This means that there is an abnormal area but it isn't cancer.) - Invasive testing procedures, such as a biopsy or surgery - Can provide clearer images of the breast if you have dense breast tissue. 3D mammography is an optional exam that anyone can have with a 2D mammogram. It doesn't replace or take the place of a 2D mammogram. 2D mammograms remain an effective screening test for all women.  Not all insurance companies cover the cost of a 3D mammogram. Check with your insurance.              Who to contact     If you have questions or need follow up information about today's clinic visit or your schedule please contact [facility] directly at [phone].  Normal or non-critical lab and imaging results will be " "communicated to you by MyChart, letter or phone within 4 business days after the clinic has received the results. If you do not hear from us within 7 days, please contact the clinic through [employer] or phone. If you have a critical or abnormal lab result, we will notify you by phone as soon as possible.  Submit refill requests through [employer] or call your pharmacy and they will forward the refill request to us. Please allow 3 business days for your refill to be completed.          Additional Information About Your Visit        FirstString Information     First[employer] gives you secure access to your electronic health record. If you see a primary care provider, you can also send messages to your care team and make appointments. If you have questions, please call your primary care clinic.  If you do not have a primary care provider, please call [phone] and they will assist you.        Care EveryWhere ID     This is your Care EveryWhere ID. This could be used by other organizations to access your [facility] medical records  [id number]        Your Vitals Were     Pulse Height Last Period Pulse Oximetry BMI (Body Mass Index)       67 5' 9.5\" (1.765 m) 08/28/2017 98% 20.96 kg/m2        Blood Pressure from Last 3 Encounters:   08/28/17 117/83   02/23/17 129/81   02/03/17 125/60    Weight from Last 3 Encounters:   08/28/17 144 lb (65.3 kg)   02/23/17 149 lb (67.6 kg)   02/03/17 149 lb (67.6 kg)              We Performed the Following     HPV High Risk Types DNA Cervical     Pap imaged thin layer screen with HPV - recommended age 30 - 65          Where to get your medicines      These medications were sent to [facility] [place], [place] - [address]  [address] [address]     Phone:  [phone]     SUMAtriptan 25 MG tablet          Primary Care Provider Office Phone # Fax #    Magy PENNY Lang [phone]       [address] [address]        Equal " Access to Services     [facility]: Hadii aad ku hadalexbebe Kenanali, wavarshada luqadaha, qaybta kaalmaraymon mar. So [facility] [phone].    ATENCIÓN: Si habla lucita, tiene a quiros disposición servicios gratuitos de asistencia lingüística. Llame al [phone].    We comply with applicable federal civil rights laws and [place] laws. We do not discriminate on the basis of race, color, national origin, age, disability sex, sexual orientation or gender identity.            Thank you!     Thank you for choosing [facility]  for your care. Our goal is always to provide you with excellent care. Hearing back from our patients is one way we can continue to improve our services. Please take a few minutes to complete the written survey that you may receive in the mail after your visit with us. Thank you!             Your Updated Medication List - Protect others around you: Learn how to safely use, store and throw away your medicines at www.disposemymeds.org.          This list is accurate as of: 8/28/17  1:05 PM.  Always use your most recent med list.                   Brand Name Dispense Instructions for use Diagnosis    ADVIL 200 MG capsule   Generic drug:  ibuprofen      Take 200 mg by mouth every 4 hours as needed.        ferrous sulfate 325 (65 FE) MG tablet    IRON    180 tablet    Take 1 tablet (325 mg) by mouth 2 times daily    Iron deficiency anemia due to chronic blood loss       fexofenadine 180 MG tablet    ALLEGRA    30 tablet    Take 1 tablet (180 mg) by mouth daily    Chronic allergic conjunctivitis       NASAL SPRAY NA      Nasoquort 1 spray in each nostril daily when needed        order for DME     1 Device    Equipment being ordered: Cast shoe    Closed nondisplaced fracture of fifth metatarsal bone of left foot, initial encounter       SUMAtriptan 25 MG tablet    IMITREX    18 tablet    Take 1-2 tablets (25-50 mg) by mouth at onset of headache for  migraine May repeat dose in 2 hours.  Do not exceed 200 mg in 24 hours    Migraine with aura and without status migrainosus, not intractable       UNKNOWN TO PATIENT      Allergy Eye Drops, 1 drop in each eye daily

## 2017-08-30 LAB
COPATH REPORT: NORMAL
PAP: NORMAL

## 2017-09-01 LAB
FINAL DIAGNOSIS: NORMAL
HPV HR 12 DNA CVX QL NAA+PROBE: NEGATIVE
HPV16 DNA SPEC QL NAA+PROBE: NEGATIVE
HPV18 DNA SPEC QL NAA+PROBE: NEGATIVE
SPECIMEN DESCRIPTION: NORMAL

## 2018-05-14 ENCOUNTER — OFFICE VISIT (OUTPATIENT)
Dept: FAMILY MEDICINE | Facility: CLINIC | Age: 48
End: 2018-05-14
Payer: COMMERCIAL

## 2018-05-14 VITALS
DIASTOLIC BLOOD PRESSURE: 81 MMHG | BODY MASS INDEX: 22.44 KG/M2 | HEIGHT: 67 IN | WEIGHT: 143 LBS | HEART RATE: 67 BPM | SYSTOLIC BLOOD PRESSURE: 127 MMHG | TEMPERATURE: 98.4 F

## 2018-05-14 DIAGNOSIS — R10.2 PELVIC PAIN IN FEMALE: Primary | ICD-10-CM

## 2018-05-14 PROCEDURE — 99213 OFFICE O/P EST LOW 20 MIN: CPT | Performed by: NURSE PRACTITIONER

## 2018-05-14 NOTE — PROGRESS NOTES
"  SUBJECTIVE:   Kalina Schreiber is a 47 year old female who presents to clinic today for the following health issues:      ABDOMINAL PAIN     Onset: probably since last fall off and on    Description:   Character: Cramping /discomfort  Location: pelvic region  Radiation: None    Intensity: mild    Progression of Symptoms:  worsening and constant    Accompanying Signs & Symptoms:  Fever/Chills?: no   Gas/Bloating: YES  Nausea: YES at times  Vomitting: no   Diarrhea?: no   Constipation:no   Dysuria or Hematuria: no   Full feeling    History:   Trauma: no   Previous similar pain: no    Previous tests done: none    Precipitating factors:   Does the pain change with:     Food: no      BM: no     Urination: no     Alleviating factors:  nothing    Therapies Tried and outcome: tried an ant-acid with no relief    LMP:  5/8/2018         Problem list and histories reviewed & adjusted, as indicated.  Additional history: as documented    Reviewed and updated as needed this visit by clinical staff  Tobacco  Allergies  Meds       Reviewed and updated as needed this visit by Provider         ROS:  Constitutional, HEENT, cardiovascular, pulmonary, gi and gu systems are negative, except as otherwise noted.    OBJECTIVE:     /81 (BP Location: Left arm)  Pulse 67  Temp 98.4  F (36.9  C) (Oral)  Ht 5' 7.25\" (1.708 m)  Wt 143 lb (64.9 kg)  LMP 05/08/2018  BMI 22.23 kg/m2  Body mass index is 22.23 kg/(m^2).  GENERAL: healthy, alert and no distress  ABDOMEN: soft, no distention, no hepatosplenomegaly, no masses and bowel sounds normal. Tenderness mid-pelvic area        ASSESSMENT/PLAN:       ICD-10-CM    1. Pelvic pain in female R10.2 US Pelvic Complete w Transvaginal     Several months of pelvic pain without any associated symptoms.  Etiology unclear.  Tenderness on palpation.  Ordered pelvic ultrasound - further plan pending results.    Patient Instructions   Schedule pelvic ultrasound: [phone]        The risks, " benefits and treatment options of prescribed medications or other treatments have been discussed with the patient. The patient verbalized their understanding and should call or follow up if no improvement or if they develop further problems.    PENNY Calderon [facility]

## 2018-05-14 NOTE — MR AVS SNAPSHOT
After Visit Summary   5/14/2018    Kalina Schreiber    MRN: [id number]           Patient Information     Date Of Birth          1970        Visit Information        Provider Department      5/14/2018 12:40 PM Magy Franco APRN CNP [facility]        Today's Diagnoses     Pelvic pain in female    -  1      Care Instructions    Schedule pelvic ultrasound: [phone]        Thank you for choosing [facility].  You may be receiving a survey in the mail from Ayush Lebron regarding your visit today.  Please take a few minutes to complete and return the survey to let us know how we are doing.      If you have questions or concerns, please contact us via [employer] or you can contact your care team at [phone].    Our Clinic hours are:  Monday 6:40 am  to 7:00 pm  Tuesday -Friday 6:40 am to 5:00 pm    The [place] outpatient lab hours are:  Monday - Friday 6:10 am to 4:45 pm  Saturdays 7:00 am to 11:00 am  Appointments are required, call [phone]    If you have clinical questions after hours or would like to schedule an appointment,  call the clinic at [phone].            Follow-ups after your visit        Future tests that were ordered for you today     Open Future Orders        Priority Expected Expires Ordered    US Pelvic Complete w Transvaginal Routine  5/14/2019 5/14/2018            Who to contact     If you have questions or need follow up information about today's clinic visit or your schedule please contact [facility] directly at [phone].  Normal or non-critical lab and imaging results will be communicated to you by [employer], letter or phone within 4 business days after the clinic has received the results. If you do not hear from us within 7 days, please contact the clinic through [employer] or phone. If you have a critical or abnormal lab result, we will notify you by phone as soon as possible.  Submit refill requests through [employer]  "or call your pharmacy and they will forward the refill request to us. Please allow 3 business days for your refill to be completed.          Additional Information About Your Visit        MyChart Information     [employer] gives you secure access to your electronic health record. If you see a primary care provider, you can also send messages to your care team and make appointments. If you have questions, please call your primary care clinic.  If you do not have a primary care provider, please call [phone] and they will assist you.        Care EveryWhere ID     This is your Care EveryWhere ID. This could be used by other organizations to access your [facility] medical records  [id number]        Your Vitals Were     Pulse Temperature Height Last Period BMI (Body Mass Index)       67 98.4  F (36.9  C) (Oral) 5' 7.25\" (1.708 m) 05/08/2018 22.23 kg/m2        Blood Pressure from Last 3 Encounters:   05/14/18 127/81   08/28/17 117/83   02/23/17 129/81    Weight from Last 3 Encounters:   05/14/18 143 lb (64.9 kg)   08/28/17 144 lb (65.3 kg)   02/23/17 149 lb (67.6 kg)               Primary Care Provider Office Phone # Fax #    Magy Plata PENNY Garcia [facility] [phone] [address] [address]        Equal Access to Services     YOGESH GARCIA AH: Hadii lisandro camara hadasho Sodinorahali, waaxda luqadaha, qaybta kaalmada ademattyada, raymon morrison . So [facility] [phone].    ATENCIÓN: Si habla español, tiene a quiros disposición servicios gratuitos de asistencia lingüística. Llame al [phone].    We comply with applicable federal civil rights laws and [place] laws. We do not discriminate on the basis of race, color, national origin, age, disability, sex, sexual orientation, or gender identity.            Thank you!     Thank you for choosing [facility]  for your care. Our goal is always to provide you with excellent care. Hearing back from our patients is one " way we can continue to improve our services. Please take a few minutes to complete the written survey that you may receive in the mail after your visit with us. Thank you!             Your Updated Medication List - Protect others around you: Learn how to safely use, store and throw away your medicines at www.disposemymeds.org.          This list is accurate as of 5/14/18 12:57 PM.  Always use your most recent med list.                   Brand Name Dispense Instructions for use Diagnosis    ADVIL 200 MG capsule   Generic drug:  ibuprofen      Take 200 mg by mouth every 4 hours as needed.        ferrous sulfate 325 (65 Fe) MG tablet    IRON    180 tablet    Take 1 tablet (325 mg) by mouth 2 times daily    Iron deficiency anemia due to chronic blood loss       fexofenadine 180 MG tablet    ALLEGRA    30 tablet    Take 1 tablet (180 mg) by mouth daily    Chronic allergic conjunctivitis       NASAL SPRAY NA      Nasoquort 1 spray in each nostril daily when needed        order for DME     1 Device    Equipment being ordered: Cast shoe    Closed nondisplaced fracture of fifth metatarsal bone of left foot, initial encounter       SUMAtriptan 25 MG tablet    IMITREX    18 tablet    Take 1-2 tablets (25-50 mg) by mouth at onset of headache for migraine May repeat dose in 2 hours.  Do not exceed 200 mg in 24 hours    Migraine with aura and without status migrainosus, not intractable       UNKNOWN TO PATIENT      Allergy Eye Drops, 1 drop in each eye daily

## 2018-05-14 NOTE — PATIENT INSTRUCTIONS
Schedule pelvic ultrasound: [phone]        Thank you for choosing [facility].  You may be receiving a survey in the mail from [employer] regarding your visit today.  Please take a few minutes to complete and return the survey to let us know how we are doing.      If you have questions or concerns, please contact us via [employer] or you can contact your care team at [phone].    Our Clinic hours are:  Monday 6:40 am  to 7:00 pm  Tuesday -Friday 6:40 am to 5:00 pm    The [place] outpatient lab hours are:  Monday - Friday 6:10 am to 4:45 pm  Saturdays 7:00 am to 11:00 am  Appointments are required, call [phone]    If you have clinical questions after hours or would like to schedule an appointment,  call the clinic at [phone].

## 2018-05-17 ENCOUNTER — HOSPITAL ENCOUNTER (OUTPATIENT)
Dept: ULTRASOUND IMAGING | Facility: CLINIC | Age: 48
Discharge: HOME | End: 2018-05-17
Attending: NURSE PRACTITIONER | Admitting: NURSE PRACTITIONER
Payer: COMMERCIAL

## 2018-05-17 DIAGNOSIS — R10.2 PELVIC PAIN IN FEMALE: ICD-10-CM

## 2018-05-17 PROCEDURE — 76830 TRANSVAGINAL US NON-OB: CPT

## 2018-05-18 DIAGNOSIS — N83.201 CYST OF RIGHT OVARY: Primary | ICD-10-CM

## 2018-07-16 ENCOUNTER — HOSPITAL ENCOUNTER (OUTPATIENT)
Dept: ULTRASOUND IMAGING | Facility: CLINIC | Age: 48
Discharge: HOME | End: 2018-07-16
Attending: NURSE PRACTITIONER | Admitting: NURSE PRACTITIONER
Payer: COMMERCIAL

## 2018-07-16 DIAGNOSIS — N83.201 CYST OF RIGHT OVARY: ICD-10-CM

## 2018-07-16 PROCEDURE — 76830 TRANSVAGINAL US NON-OB: CPT

## 2018-09-13 ENCOUNTER — HOSPITAL ENCOUNTER (OUTPATIENT)
Dept: MAMMOGRAPHY | Facility: CLINIC | Age: 48
Discharge: HOME | End: 2018-09-13
Attending: NURSE PRACTITIONER | Admitting: NURSE PRACTITIONER
Payer: COMMERCIAL

## 2018-09-13 DIAGNOSIS — Z12.31 VISIT FOR SCREENING MAMMOGRAM: ICD-10-CM

## 2018-09-13 PROCEDURE — 77067 SCR MAMMO BI INCL CAD: CPT

## 2019-01-31 ENCOUNTER — OFFICE VISIT (OUTPATIENT)
Dept: FAMILY MEDICINE | Facility: CLINIC | Age: 49
End: 2019-01-31
Payer: COMMERCIAL

## 2019-01-31 VITALS
HEART RATE: 70 BPM | DIASTOLIC BLOOD PRESSURE: 80 MMHG | HEIGHT: 67 IN | OXYGEN SATURATION: 100 % | TEMPERATURE: 97.1 F | SYSTOLIC BLOOD PRESSURE: 112 MMHG | BODY MASS INDEX: 23.54 KG/M2 | RESPIRATION RATE: 16 BRPM | WEIGHT: 150 LBS

## 2019-01-31 DIAGNOSIS — M77.02 MEDIAL EPICONDYLITIS OF ELBOW, LEFT: Primary | ICD-10-CM

## 2019-01-31 PROCEDURE — 99213 OFFICE O/P EST LOW 20 MIN: CPT | Performed by: NURSE PRACTITIONER

## 2019-01-31 ASSESSMENT — MIFFLIN-ST. JEOR: SCORE: 1348.03

## 2019-01-31 NOTE — PROGRESS NOTES
"  SUBJECTIVE:   Kalina Schreiber is a 48 year old female who presents to clinic today for the following health issues:      Musculoskeletal problem/pain      Duration: 2 weeks    Description  Location: left elbow    Intensity:  Mild to moderate    Accompanying signs and symptoms: pain on the inside of left elbow    Mostly constant    Worse with certain movements    History  Previous similar problem: no   Previous evaluation:  none    Precipitating or alleviating factors:  Trauma or overuse: YES- maybe;  Doing more weight and repetitions at the gym-  Has stopped weight training for a couple weeks and the pain is not getting better  Aggravating factors include: overuse    Therapies tried and outcome: rest/inactivity;  Bought an elbow brace, but hasn't used it yet.          Problem list and histories reviewed & adjusted, as indicated.  Additional history: as documented      Reviewed and updated as needed this visit by clinical staff  Tobacco  Allergies  Meds  Med Hx  Surg Hx  Fam Hx  Soc Hx      Reviewed and updated as needed this visit by Provider         ROS:  Constitutional, HEENT, cardiovascular, pulmonary, gi and gu systems are negative, except as otherwise noted.    OBJECTIVE:     /80 (BP Location: Right arm)   Pulse 70   Temp 97.1  F (36.2  C) (Tympanic)   Resp 16   Ht 1.702 m (5' 7\")   Wt 68 kg (150 lb)   SpO2 100%   Breastfeeding? No   BMI 23.49 kg/m    Body mass index is 23.49 kg/m .  GENERAL: healthy, alert and no distress  MS: elbow exam: normal appearance, range of motion full and tender at the medial epicondyle    ASSESSMENT/PLAN:       ICD-10-CM    1. Medial epicondylitis of elbow, left M77.02      She bought an elbow strap - MA showed her how to use it. She may wear it during activity.    Patient Instructions   Discussed strengthening, and stretching - handout given.  Ice for 15 minutes at least twice daily.   NSAID (ibuprofen 600 mg every 6 hours or aleve 2 tabs twice daily) for " pain and inflammation.  Return for Follow up if not improving in 2 weeks.   Consider Physical therapy at that time if needed.        The risks, benefits and treatment options of prescribed medications or other treatments have been discussed with the patient. The patient verbalized their understanding and should call or follow up if no improvement or if they develop further problems.    PENNY Calderon [facility]

## 2019-01-31 NOTE — PATIENT INSTRUCTIONS
Discussed strengthening, and stretching - handout given.  Ice for 15 minutes at least twice daily.   NSAID (ibuprofen 600 mg every 6 hours or aleve 2 tabs twice daily) for pain and inflammation.  Return for Follow up if not improving in 2 weeks.   Consider Physical therapy at that time if needed.            Thank you for choosing [facility].  You may be receiving a survey in the mail from Ayush Lebron regarding your visit today.  Please take a few minutes to complete and return the survey to let us know how we are doing.      If you have questions or concerns, please contact us via [employer] or you can contact your care team at [phone].    Our Clinic hours are:  Monday 6:40 am  to 7:00 pm  Tuesday -Friday 6:40 am to 5:00 pm    The [place] outpatient lab hours are:  Monday - Friday 6:10 am to 4:45 pm  Saturdays 7:00 am to 11:00 am  Appointments are required, call [phone]    If you have clinical questions after hours or would like to schedule an appointment,  call the clinic at [phone].

## 2019-03-14 ENCOUNTER — OFFICE VISIT (OUTPATIENT)
Dept: DERMATOLOGY | Facility: CLINIC | Age: 49
End: 2019-03-14
Payer: COMMERCIAL

## 2019-03-14 VITALS — SYSTOLIC BLOOD PRESSURE: 116 MMHG | HEART RATE: 71 BPM | OXYGEN SATURATION: 100 % | DIASTOLIC BLOOD PRESSURE: 70 MMHG

## 2019-03-14 DIAGNOSIS — D18.01 CHERRY ANGIOMA: ICD-10-CM

## 2019-03-14 DIAGNOSIS — D48.5 NEOPLASM OF UNCERTAIN BEHAVIOR OF SKIN: Primary | ICD-10-CM

## 2019-03-14 DIAGNOSIS — L81.4 LENTIGO: ICD-10-CM

## 2019-03-14 DIAGNOSIS — Z80.8 FAMILY HISTORY OF MELANOMA: ICD-10-CM

## 2019-03-14 DIAGNOSIS — D22.9 MULTIPLE BENIGN NEVI: ICD-10-CM

## 2019-03-14 DIAGNOSIS — L82.1 SEBORRHEIC KERATOSIS: ICD-10-CM

## 2019-03-14 PROCEDURE — 99213 OFFICE O/P EST LOW 20 MIN: CPT | Mod: 25 | Performed by: PHYSICIAN ASSISTANT

## 2019-03-14 PROCEDURE — 11102 TANGNTL BX SKIN SINGLE LES: CPT | Performed by: PHYSICIAN ASSISTANT

## 2019-03-14 PROCEDURE — 88305 TISSUE EXAM BY PATHOLOGIST: CPT | Mod: TC | Performed by: PHYSICIAN ASSISTANT

## 2019-03-14 NOTE — PATIENT INSTRUCTIONS
Wound Care Instructions     FOR SUPERFICIAL WOUNDS     [facility] [phone]    [facility] [phone]                       AFTER 24 HOURS YOU SHOULD REMOVE THE BANDAGE AND BEGIN DAILY DRESSING CHANGES AS FOLLOWS:     1) Remove Dressing.     2) Clean and dry the area with tap water using a Q-tip or sterile gauze pad.     3) Apply Vaseline, Aquaphor, Polysporin ointment or Bacitracin ointment over entire wound.  Do NOT use Neosporin ointment.     4) Cover the wound with a band-aid, or a sterile non-stick gauze pad and micropore paper tape      REPEAT THESE INSTRUCTIONS AT LEAST ONCE A DAY UNTIL THE WOUND HAS COMPLETELY HEALED.    It is an old wives tale that a wound heals better when it is exposed to air and allowed to dry out. The wound will heal faster with a better cosmetic result if it is kept moist with ointment and covered with a bandage.    **Do not let the wound dry out.**      Supplies Needed:      *Cotton tipped applicators (Q-tips)    *Polysporin Ointment or Bacitracin Ointment (NOT NEOSPORIN)    *Band-aids or non-stick gauze pads and micropore paper tape.      PATIENT INFORMATION:    During the healing process you will notice a number of changes. All wounds develop a small halo of redness surrounding the wound.  This means healing is occurring. Severe itching with extensive redness usually indicates sensitivity to the ointment or bandage tape used to dress the wound.  You should call our office if this develops.      Swelling  and/or discoloration around your surgical site is common, particularly when performed around the eye.    All wounds normally drain.  The larger the wound the more drainage there will be.  After 7-10 days, you will notice the wound beginning to shrink and new skin will begin to grow.  The wound is healed when you can see skin has formed over the entire area.  A healed wound has a healthy, shiny look to the surface and is red to dark pink in color  to normalize.  Wounds may take approximately 4-6 weeks to heal.  Larger wounds may take 6-8 weeks.  After the wound is healed you may discontinue dressing changes.    You may experience a sensation of tightness as your wound heals. This is normal and will gradually subside.    Your healed wound may be sensitive to temperature changes. This sensitivity improves with time, but if you re having a lot of discomfort, try to avoid temperature extremes.    Patients frequently experience itching after their wound appears to have healed because of the continue healing under the skin.  Plain Vaseline will help relieve the itching.        POSSIBLE COMPLICATIONS    BLEEDIN. Leave the bandage in place.  2. Use tightly rolled up gauze or a cloth to apply direct pressure over the bandage for 30  minutes.  3. Reapply pressure for an additional 30 minutes if necessary  4. Use additional gauze and tape to maintain pressure once the bleeding has stopped.

## 2019-03-14 NOTE — LETTER
3/14/2019         RE: Kalina Schreiber  [address] [address] [facility] [id number]        Dear Colleague,    Thank you for referring your patient, Kalina Schreiber, to the [facility]. Please see a copy of my visit note below.    Kalina Schreiber is a 48 year old year old female patient here today for skin check. She notes a new spot on left leg, present for a few months. She denies any pain or bleeding.  Patient has no other skin complaints today.  Remainder of the HPI, Meds, PMH, Allergies, FH, and SH was reviewed in chart.    Pertinent Hx:  No personal history of skin cancer. Family history of Melanoma   Past Medical History:   Diagnosis Date     Allergic rhinitis due to other allergen      Solitary cyst of breast      Twin pregnancy, delivered        Past Surgical History:   Procedure Laterality Date     COLONOSCOPY  2/23/2012    Procedure:COLONOSCOPY; Colonoscopy  ; Surgeon:BOB TY; Location:[facility]     COLONOSCOPY N/A 5/6/2016    Procedure: COLONOSCOPY;  Surgeon: Markus Grossman MD;  Location: [facility]     ESOPHAGOSCOPY, GASTROSCOPY, DUODENOSCOPY (EGD), COMBINED N/A 5/6/2016    Procedure: COMBINED ESOPHAGOSCOPY, GASTROSCOPY, DUODENOSCOPY (EGD);  Surgeon: Markus Grossman MD;  Location: [facility]     LASER SURGERY OF EYE  8/21/2008    Both eyes     SURGICAL HISTORY OF -       T&A     SURGICAL HISTORY OF -       Csection     SURGICAL HISTORY OF -       breast augmentation        Family History   Problem Relation Age of Onset     Alcohol/Drug Mother      Cancer Mother         skin, COD: lung, liver, bone cancer     Melanoma Mother      Diabetes Mother         Post transplant onset     Alcohol/Drug Father      Hypertension Father      Cancer Maternal Grandmother         lung/liver     Asthma Maternal Grandmother      Cancer Maternal Grandfather         skin     Cancer - colorectal Maternal Grandfather      Melanoma Maternal Grandfather      C.A.D. No family hx of      Cerebrovascular  Disease No family hx of      Breast Cancer No family hx of      Prostate Cancer No family hx of        Social History     Socioeconomic History     Marital status:      Spouse name: Not on file     Number of children: Not on file     Years of education: Not on file     Highest education level: Not on file   Occupational History     Employer: SELF   Social Needs     Financial resource strain: Not on file     Food insecurity:     Worry: Not on file     Inability: Not on file     Transportation needs:     Medical: Not on file     Non-medical: Not on file   Tobacco Use     Smoking status: Never Smoker     Smokeless tobacco: Never Used   Substance and Sexual Activity     Alcohol use: Yes     Comment: 1-2 drinks per week     Drug use: No     Sexual activity: Yes     Partners: Male     Birth control/protection: Surgical     Comment: vas   Lifestyle     Physical activity:     Days per week: Not on file     Minutes per session: Not on file     Stress: Not on file   Relationships     Social connections:     Talks on phone: Not on file     Gets together: Not on file     Attends Mandaen service: Not on file     Active member of club or organization: Not on file     Attends meetings of clubs or organizations: Not on file     Relationship status: Not on file     Intimate partner violence:     Fear of current or ex partner: Not on file     Emotionally abused: Not on file     Physically abused: Not on file     Forced sexual activity: Not on file   Other Topics Concern     Parent/sibling w/ CABG, MI or angioplasty before 65F 55M? 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      Surrogate for twins       Outpatient Encounter Medications as of 3/14/2019   Medication Sig Dispense Refill     ferrous sulfate (IRON) 325 (65 FE) MG tablet Take 1 tablet (325 mg) by mouth 2 times daily 180 tablet 3     fexofenadine (ALLEGRA) 180 MG tablet Take 1 tablet (180 mg) by mouth daily (Patient taking  differently: Take 180 mg by mouth daily as needed ) 30 tablet 0     ibuprofen (ADVIL) 200 MG capsule Take 200 mg by mouth every 4 hours as needed.       Oxymetazoline HCl (NASAL SPRAY NA) Nasoquort 1 spray in each nostril daily when needed       SUMAtriptan (IMITREX) 25 MG tablet Take 1-2 tablets (25-50 mg) by mouth at onset of headache for migraine May repeat dose in 2 hours.  Do not exceed 200 mg in 24 hours 18 tablet 1     UNKNOWN TO PATIENT Allergy Eye Drops, 1 drop in each eye daily PRN       order for DME Equipment being ordered: Cast shoe (Patient not taking: Reported on 3/14/2019) 1 Device 0     No facility-administered encounter medications on file as of 3/14/2019.              Review Of Systems  Skin: As above  Eyes: negative  Ears/Nose/Throat: negative  Respiratory: No shortness of breath, dyspnea on exertion, cough, or hemoptysis  Cardiovascular: negative  Gastrointestinal: negative  Genitourinary: negative  Musculoskeletal: negative  Neurologic: negative  Psychiatric: negative  Hematologic/Lymphatic/Immunologic: negative  Endocrine: negative      O:   NAD, WDWN, Alert & Oriented, Mood & Affect wnl, Vitals stable   Here today alone   /70 (BP Location: Right arm, Patient Position: Chair, Cuff Size: Adult Regular)   Pulse 71   SpO2 100%    General appearance normal   Vitals stable   Alert, oriented and in no acute distress     0.6 cm pink pearly thin papule on left superior shin  Stuck on papules and brown macules on trunk and ext   Red papules on trunk  Brown papules and macules with regular pigment network and borders on torso and extremities      The remainder of the detailed exam was unremarkable; the following areas were examined:  scalp/hair, conjunctiva/lids, face, neck, lips/teeth, oral mucosa/gingiva, chest, back, abdomen, buttocks, digits/nails, RUE, LUE, RLE, LLE.      Eyes: Conjunctivae/lids:Normal     ENT: Lips: normal    MSK:Normal    Cardiovascular: peripheral edema none    Pulm:  Breathing Normal    Neuro/Psych: Orientation:Normal; Mood/Affect:Normal  A/P:  1. Family history of Melanoma  I explained the need for monthly skin exams including and taught the patient how to do this. Patient was asked about new or changing moles and a full skin exam was performed. I also reviewed the importance of protection from sun exposure, including wearing long sleeves, hats, etc and also the use of sunscreen with SPF of at least 35, with frequent re-applications.   2. R/O BCC on left superior shin  TANGENTIAL BIOPSY SENT OUT:  After consent, anesthesia with LEC and prep, tangential excision performed and specimen sent out for permanent section histology.  No complications and routine wound care. Patient told to call our office in 1-2 weeks for result.      3. Seborrheic keratosis, lentigo, angioma, benign nevi   BENIGN LESIONS DISCUSSED WITH PATIENT:  I discussed the specifics of tumor, prognosis, and genetics of benign lesions.  I explained that treatment of these lesions would be purely cosmetic and not medically neccessary.  I discussed with patient different removal options including excision, cautery and /or laser.      Nature and genetics of benign skin lesions dicussed with patient.  Signs and Symptoms of skin cancer discussed with patient.  ABCDEs of melanoma reviewed with patient.  Patient encouraged to perform monthly skin exams.  UV precautions reviewed with patient.  Patient to follow up with Primary Care provider regarding elevated blood pressure.  Skin care regimen reviewed with patient: Eliminate harsh soaps, i.e. Dial, zest, irsih spring; Mild soaps such as Cetaphil or Dove sensitive skin, avoid hot or cold showers, aggressive use of emollients including vanicream, cetaphil or cerave discussed with patient.    Risks of non-melanoma skin cancer discussed with patient   Return to clinic in one year or sooner pending biopsy results.       Again, thank you for allowing me to participate in the  care of your patient.        Sincerely,        Talisha Aguilar PA-C

## 2019-03-14 NOTE — PROGRESS NOTES
Kalina Schreiber is a 48 year old year old female patient here today for skin check. She notes a new spot on left leg, present for a few months. She denies any pain or bleeding.  Patient has no other skin complaints today.  Remainder of the HPI, Meds, PMH, Allergies, FH, and SH was reviewed in chart.    Pertinent Hx:  No personal history of skin cancer. Family history of Melanoma   Past Medical History:   Diagnosis Date     Allergic rhinitis due to other allergen      Solitary cyst of breast      Twin pregnancy, delivered        Past Surgical History:   Procedure Laterality Date     COLONOSCOPY  2/23/2012    Procedure:COLONOSCOPY; Colonoscopy  ; Surgeon:BOB TY; Location:[facility]     COLONOSCOPY N/A 5/6/2016    Procedure: COLONOSCOPY;  Surgeon: Markus Grossman MD;  Location: [facility]     ESOPHAGOSCOPY, GASTROSCOPY, DUODENOSCOPY (EGD), COMBINED N/A 5/6/2016    Procedure: COMBINED ESOPHAGOSCOPY, GASTROSCOPY, DUODENOSCOPY (EGD);  Surgeon: Markus Grossman MD;  Location: [facility]     LASER SURGERY OF EYE  8/21/2008    Both eyes     SURGICAL HISTORY OF -       T&A     SURGICAL HISTORY OF -       Csection     SURGICAL HISTORY OF -       breast augmentation        Family History   Problem Relation Age of Onset     Alcohol/Drug Mother      Cancer Mother         skin, COD: lung, liver, bone cancer     Melanoma Mother      Diabetes Mother         Post transplant onset     Alcohol/Drug Father      Hypertension Father      Cancer Maternal Grandmother         lung/liver     Asthma Maternal Grandmother      Cancer Maternal Grandfather         skin     Cancer - colorectal Maternal Grandfather      Melanoma Maternal Grandfather      C.A.D. No family hx of      Cerebrovascular Disease No family hx of      Breast Cancer No family hx of      Prostate Cancer No family hx of        Social History     Socioeconomic History     Marital status:      Spouse name: Not on file     Number of children: Not on file     Years  of education: Not on file     Highest education level: Not on file   Occupational History     Employer: SELF   Social Needs     Financial resource strain: Not on file     Food insecurity:     Worry: Not on file     Inability: Not on file     Transportation needs:     Medical: Not on file     Non-medical: Not on file   Tobacco Use     Smoking status: Never Smoker     Smokeless tobacco: Never Used   Substance and Sexual Activity     Alcohol use: Yes     Comment: 1-2 drinks per week     Drug use: No     Sexual activity: Yes     Partners: Male     Birth control/protection: Surgical     Comment: vas   Lifestyle     Physical activity:     Days per week: Not on file     Minutes per session: Not on file     Stress: Not on file   Relationships     Social connections:     Talks on phone: Not on file     Gets together: Not on file     Attends Zoroastrian service: Not on file     Active member of club or organization: Not on file     Attends meetings of clubs or organizations: Not on file     Relationship status: Not on file     Intimate partner violence:     Fear of current or ex partner: Not on file     Emotionally abused: Not on file     Physically abused: Not on file     Forced sexual activity: Not on file   Other Topics Concern     Parent/sibling w/ CABG, MI or angioplasty before 65F 55M? 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      Surrogate for twins       Outpatient Encounter Medications as of 3/14/2019   Medication Sig Dispense Refill     ferrous sulfate (IRON) 325 (65 FE) MG tablet Take 1 tablet (325 mg) by mouth 2 times daily 180 tablet 3     fexofenadine (ALLEGRA) 180 MG tablet Take 1 tablet (180 mg) by mouth daily (Patient taking differently: Take 180 mg by mouth daily as needed ) 30 tablet 0     ibuprofen (ADVIL) 200 MG capsule Take 200 mg by mouth every 4 hours as needed.       Oxymetazoline HCl (NASAL SPRAY NA) Nasoquort 1 spray in each nostril daily when needed        SUMAtriptan (IMITREX) 25 MG tablet Take 1-2 tablets (25-50 mg) by mouth at onset of headache for migraine May repeat dose in 2 hours.  Do not exceed 200 mg in 24 hours 18 tablet 1     UNKNOWN TO PATIENT Allergy Eye Drops, 1 drop in each eye daily PRN       order for DME Equipment being ordered: Cast shoe (Patient not taking: Reported on 3/14/2019) 1 Device 0     No facility-administered encounter medications on file as of 3/14/2019.              Review Of Systems  Skin: As above  Eyes: negative  Ears/Nose/Throat: negative  Respiratory: No shortness of breath, dyspnea on exertion, cough, or hemoptysis  Cardiovascular: negative  Gastrointestinal: negative  Genitourinary: negative  Musculoskeletal: negative  Neurologic: negative  Psychiatric: negative  Hematologic/Lymphatic/Immunologic: negative  Endocrine: negative      O:   NAD, WDWN, Alert & Oriented, Mood & Affect wnl, Vitals stable   Here today alone   /70 (BP Location: Right arm, Patient Position: Chair, Cuff Size: Adult Regular)   Pulse 71   SpO2 100%    General appearance normal   Vitals stable   Alert, oriented and in no acute distress     0.6 cm pink pearly thin papule on left superior shin  Stuck on papules and brown macules on trunk and ext   Red papules on trunk  Brown papules and macules with regular pigment network and borders on torso and extremities      The remainder of the detailed exam was unremarkable; the following areas were examined:  scalp/hair, conjunctiva/lids, face, neck, lips/teeth, oral mucosa/gingiva, chest, back, abdomen, buttocks, digits/nails, RUE, LUE, RLE, LLE.      Eyes: Conjunctivae/lids:Normal     ENT: Lips: normal    MSK:Normal    Cardiovascular: peripheral edema none    Pulm: Breathing Normal    Neuro/Psych: Orientation:Normal; Mood/Affect:Normal  A/P:  1. Family history of Melanoma  I explained the need for monthly skin exams including and taught the patient how to do this. Patient was asked about new or changing moles  and a full skin exam was performed. I also reviewed the importance of protection from sun exposure, including wearing long sleeves, hats, etc and also the use of sunscreen with SPF of at least 35, with frequent re-applications.   2. R/O BCC on left superior shin  TANGENTIAL BIOPSY SENT OUT:  After consent, anesthesia with LEC and prep, tangential excision performed and specimen sent out for permanent section histology.  No complications and routine wound care. Patient told to call our office in 1-2 weeks for result.      3. Seborrheic keratosis, lentigo, angioma, benign nevi   BENIGN LESIONS DISCUSSED WITH PATIENT:  I discussed the specifics of tumor, prognosis, and genetics of benign lesions.  I explained that treatment of these lesions would be purely cosmetic and not medically neccessary.  I discussed with patient different removal options including excision, cautery and /or laser.      Nature and genetics of benign skin lesions dicussed with patient.  Signs and Symptoms of skin cancer discussed with patient.  ABCDEs of melanoma reviewed with patient.  Patient encouraged to perform monthly skin exams.  UV precautions reviewed with patient.  Patient to follow up with Primary Care provider regarding elevated blood pressure.  Skin care regimen reviewed with patient: Eliminate harsh soaps, i.e. Dial, zest, irsih spring; Mild soaps such as Cetaphil or Dove sensitive skin, avoid hot or cold showers, aggressive use of emollients including vanicream, cetaphil or cerave discussed with patient.    Risks of non-melanoma skin cancer discussed with patient   Return to clinic in one year or sooner pending biopsy results.

## 2019-03-20 LAB — COPATH REPORT: NORMAL

## 2019-03-26 ENCOUNTER — OFFICE VISIT (OUTPATIENT)
Dept: DERMATOLOGY | Facility: CLINIC | Age: 49
End: 2019-03-26
Payer: COMMERCIAL

## 2019-03-26 VITALS — SYSTOLIC BLOOD PRESSURE: 131 MMHG | HEART RATE: 68 BPM | OXYGEN SATURATION: 100 % | DIASTOLIC BLOOD PRESSURE: 74 MMHG

## 2019-03-26 DIAGNOSIS — C44.719 BASAL CELL CARCINOMA (BCC) OF LEFT LOWER LEG: Primary | ICD-10-CM

## 2019-03-26 PROCEDURE — 17313 MOHS 1 STAGE T/A/L: CPT | Performed by: DERMATOLOGY

## 2019-03-26 NOTE — NURSING NOTE
Chief Complaint   Patient presents with     Derm Problem     mohs       Vitals:    03/26/19 0751   BP: 131/74   Pulse: 68   SpO2: 100%     Wt Readings from Last 1 Encounters:   01/31/19 68 kg (150 lb)       Xiao Stoll LPN.................3/26/2019

## 2019-03-26 NOTE — LETTER
3/26/2019         RE: Kalina Schreiber  [address] [facility] [address]        Dear Colleague,    Thank you for referring your patient, Kalina Schreiber, to the [facility]. Please see a copy of my visit note below.    Surgical Office Location :   [facility]  [address] [place] [address]      Kalina Schreiber is a 48 year old year old female patient here today for evaluation and managment of bc con left shin.  Patient reports the following modifying factors none.  Associated symptoms: none.  Patient has no other skin complaints today.  Remainder of the HPI, Meds, PMH, Allergies, FH, and SH was reviewed in chart.      Past Medical History:   Diagnosis Date     Allergic rhinitis due to other allergen      Basal cell carcinoma      Solitary cyst of breast      Twin pregnancy, delivered        Past Surgical History:   Procedure Laterality Date     COLONOSCOPY  2/23/2012    Procedure:COLONOSCOPY; Colonoscopy  ; Surgeon:BOB TY; Location:[facility]     COLONOSCOPY N/A 5/6/2016    Procedure: COLONOSCOPY;  Surgeon: Markus Grossman MD;  Location: [facility]     ESOPHAGOSCOPY, GASTROSCOPY, DUODENOSCOPY (EGD), COMBINED N/A 5/6/2016    Procedure: COMBINED ESOPHAGOSCOPY, GASTROSCOPY, DUODENOSCOPY (EGD);  Surgeon: Markus Grossman MD;  Location: [facility]     LASER SURGERY OF EYE  8/21/2008    Both eyes     SURGICAL HISTORY OF -       T&A     SURGICAL HISTORY OF -       Csection     SURGICAL HISTORY OF -       breast augmentation        Family History   Problem Relation Age of Onset     Alcohol/Drug Mother      Cancer Mother         skin, COD: lung, liver, bone cancer     Melanoma Mother      Diabetes Mother         Post transplant onset     Alcohol/Drug Father      Hypertension Father      Cancer Maternal Grandmother         lung/liver     Asthma Maternal Grandmother      Cancer Maternal Grandfather         skin     Cancer - colorectal Maternal Grandfather       Melanoma Maternal Grandfather      ANTHONYARICH No family hx of      Cerebrovascular Disease No family hx of      Breast Cancer No family hx of      Prostate Cancer No family hx of        Social History     Socioeconomic History     Marital status:      Spouse name: Not on file     Number of children: Not on file     Years of education: Not on file     Highest education level: Not on file   Occupational History     Employer: SELF   Social Needs     Financial resource strain: Not on file     Food insecurity:     Worry: Not on file     Inability: Not on file     Transportation needs:     Medical: Not on file     Non-medical: Not on file   Tobacco Use     Smoking status: Never Smoker     Smokeless tobacco: Never Used   Substance and Sexual Activity     Alcohol use: Yes     Comment: 1-2 drinks per week     Drug use: No     Sexual activity: Yes     Partners: Male     Birth control/protection: Surgical     Comment: vas   Lifestyle     Physical activity:     Days per week: Not on file     Minutes per session: Not on file     Stress: Not on file   Relationships     Social connections:     Talks on phone: Not on file     Gets together: Not on file     Attends Mormon service: Not on file     Active member of club or organization: Not on file     Attends meetings of clubs or organizations: Not on file     Relationship status: Not on file     Intimate partner violence:     Fear of current or ex partner: Not on file     Emotionally abused: Not on file     Physically abused: Not on file     Forced sexual activity: Not on file   Other Topics Concern     Parent/sibling w/ CABG, MI or angioplasty before 65F 55M? 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      Surrogate for twins       Outpatient Encounter Medications as of 3/26/2019   Medication Sig Dispense Refill     ferrous sulfate (IRON) 325 (65 FE) MG tablet Take 1 tablet (325 mg) by mouth 2 times daily 180 tablet 3     fexofenadine  (ALLEGRA) 180 MG tablet Take 1 tablet (180 mg) by mouth daily (Patient taking differently: Take 180 mg by mouth daily as needed ) 30 tablet 0     ibuprofen (ADVIL) 200 MG capsule Take 200 mg by mouth every 4 hours as needed.       Oxymetazoline HCl (NASAL SPRAY NA) Nasoquort 1 spray in each nostril daily when needed       SUMAtriptan (IMITREX) 25 MG tablet Take 1-2 tablets (25-50 mg) by mouth at onset of headache for migraine May repeat dose in 2 hours.  Do not exceed 200 mg in 24 hours 18 tablet 1     UNKNOWN TO PATIENT Allergy Eye Drops, 1 drop in each eye daily PRN       order for DME Equipment being ordered: Cast shoe (Patient not taking: Reported on 3/14/2019) 1 Device 0     No facility-administered encounter medications on file as of 3/26/2019.              Review Of Systems  Skin: As above  Eyes: negative  Ears/Nose/Throat: negative  Respiratory: No shortness of breath, dyspnea on exertion, cough, or hemoptysis  Cardiovascular: negative  Gastrointestinal: negative  Genitourinary: negative  Musculoskeletal: negative  Neurologic: negative  Psychiatric: negative  Hematologic/Lymphatic/Immunologic: negative  Endocrine: negative      O:   NAD, WDWN, Alert & Oriented, Mood & Affect wnl, Vitals stable   Here today alone   /74   Pulse 68   SpO2 100%    General appearance normal   Vitals stable   Alert, oriented and in no acute distress     L shin 7mm scaly papule       Eyes: Conjunctivae/lids:Normal     ENT: Lips, buccal mucosa, tongue: normal    MSK:Normal    Cardiovascular: peripheral edema none    Pulm: Breathing Normal    Neuro/Psych: Orientation:Normal; Mood/Affect:Normal      A/P:  1. L shin basal cell carcinoma   MOHS:   Location    After PGACAC discussed with patient, decision for Mohs surgery was made. Indication for Mohs was Location. Patient confirmed the site with Dr. Villafuerte.  After anesthesia with LEC, the tumor was excised using standard Mohs technique in 1 stages(s).  CLEAR MARGINS OBTAINED  and Final defect size was 1.1 cm.       REPAIR SECOND INTENT: We discussed the options for wound management in full with the patient including risks/benefits/possible outcomes. Decision made to allow the wound to heal by second intention. EBL minimal; complications none; wound care routine.  The patient was discharged in good condition and will return in one month or prn for wound evaluation.  BENIGN LESIONS DISCUSSED WITH PATIENT:  I discussed the specifics of tumor, prognosis, and genetics of benign lesions.  I explained that treatment of these lesions would be purely cosmetic and not medically neccessary.  I discussed with patient different removal options including excision, cautery and /or laser.      Nature and genetics of benign skin lesions dicussed with patient.  Signs and Symptoms of skin cancer discussed with patient.  Patient encouraged to perform monthly skin exams.  UV precautions reviewed with patient.  Patient to follow up with Primary Care provider regarding elevated blood pressure.  Skin care regimen reviewed with patient: Eliminate harsh soaps, i.e. Dial, zest, irsih spring; Mild soaps such as Cetaphil or Dove sensitive skin, avoid hot or cold showers, aggressive use of emollients including vanicream, cetaphil or cerave discussed with patient.    Risks of non-melanoma skin cancer discussed with patient   Return to clinic 6 months      Again, thank you for allowing me to participate in the care of your patient.        Sincerely,        Rodri Villafuerte MD

## 2019-03-26 NOTE — PROGRESS NOTES
Kalina Schreiber is a 48 year old year old female patient here today for evaluation and managment of bc con left shin.  Patient reports the following modifying factors none.  Associated symptoms: none.  Patient has no other skin complaints today.  Remainder of the HPI, Meds, PMH, Allergies, FH, and SH was reviewed in chart.      Past Medical History:   Diagnosis Date     Allergic rhinitis due to other allergen      Basal cell carcinoma      Solitary cyst of breast      Twin pregnancy, delivered        Past Surgical History:   Procedure Laterality Date     COLONOSCOPY  2/23/2012    Procedure:COLONOSCOPY; Colonoscopy  ; Surgeon:BOB TY; Location:[facility]     COLONOSCOPY N/A 5/6/2016    Procedure: COLONOSCOPY;  Surgeon: Markus Grossman MD;  Location: [facility]     ESOPHAGOSCOPY, GASTROSCOPY, DUODENOSCOPY (EGD), COMBINED N/A 5/6/2016    Procedure: COMBINED ESOPHAGOSCOPY, GASTROSCOPY, DUODENOSCOPY (EGD);  Surgeon: Markus Grossman MD;  Location: [facility]     LASER SURGERY OF EYE  8/21/2008    Both eyes     SURGICAL HISTORY OF -       T&A     SURGICAL HISTORY OF -       Csection     SURGICAL HISTORY OF -       breast augmentation        Family History   Problem Relation Age of Onset     Alcohol/Drug Mother      Cancer Mother         skin, COD: lung, liver, bone cancer     Melanoma Mother      Diabetes Mother         Post transplant onset     Alcohol/Drug Father      Hypertension Father      Cancer Maternal Grandmother         lung/liver     Asthma Maternal Grandmother      Cancer Maternal Grandfather         skin     Cancer - colorectal Maternal Grandfather      Melanoma Maternal Grandfather      C.A.D. No family hx of      Cerebrovascular Disease No family hx of      Breast Cancer No family hx of      Prostate Cancer No family hx of        Social History     Socioeconomic History     Marital status:      Spouse name: Not on file     Number of children: Not on file     Years of education: Not on file      Highest education level: Not on file   Occupational History     Employer: SELF   Social Needs     Financial resource strain: Not on file     Food insecurity:     Worry: Not on file     Inability: Not on file     Transportation needs:     Medical: Not on file     Non-medical: Not on file   Tobacco Use     Smoking status: Never Smoker     Smokeless tobacco: Never Used   Substance and Sexual Activity     Alcohol use: Yes     Comment: 1-2 drinks per week     Drug use: No     Sexual activity: Yes     Partners: Male     Birth control/protection: Surgical     Comment: vas   Lifestyle     Physical activity:     Days per week: Not on file     Minutes per session: Not on file     Stress: Not on file   Relationships     Social connections:     Talks on phone: Not on file     Gets together: Not on file     Attends Denominational service: Not on file     Active member of club or organization: Not on file     Attends meetings of clubs or organizations: Not on file     Relationship status: Not on file     Intimate partner violence:     Fear of current or ex partner: Not on file     Emotionally abused: Not on file     Physically abused: Not on file     Forced sexual activity: Not on file   Other Topics Concern     Parent/sibling w/ CABG, MI or angioplasty before 65F 55M? 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      Surrogate for twins       Outpatient Encounter Medications as of 3/26/2019   Medication Sig Dispense Refill     ferrous sulfate (IRON) 325 (65 FE) MG tablet Take 1 tablet (325 mg) by mouth 2 times daily 180 tablet 3     fexofenadine (ALLEGRA) 180 MG tablet Take 1 tablet (180 mg) by mouth daily (Patient taking differently: Take 180 mg by mouth daily as needed ) 30 tablet 0     ibuprofen (ADVIL) 200 MG capsule Take 200 mg by mouth every 4 hours as needed.       Oxymetazoline HCl (NASAL SPRAY NA) Nasoquort 1 spray in each nostril daily when needed       SUMAtriptan (IMITREX) 25 MG  tablet Take 1-2 tablets (25-50 mg) by mouth at onset of headache for migraine May repeat dose in 2 hours.  Do not exceed 200 mg in 24 hours 18 tablet 1     UNKNOWN TO PATIENT Allergy Eye Drops, 1 drop in each eye daily PRN       order for DME Equipment being ordered: Cast shoe (Patient not taking: Reported on 3/14/2019) 1 Device 0     No facility-administered encounter medications on file as of 3/26/2019.              Review Of Systems  Skin: As above  Eyes: negative  Ears/Nose/Throat: negative  Respiratory: No shortness of breath, dyspnea on exertion, cough, or hemoptysis  Cardiovascular: negative  Gastrointestinal: negative  Genitourinary: negative  Musculoskeletal: negative  Neurologic: negative  Psychiatric: negative  Hematologic/Lymphatic/Immunologic: negative  Endocrine: negative      O:   NAD, WDWN, Alert & Oriented, Mood & Affect wnl, Vitals stable   Here today alone   /74   Pulse 68   SpO2 100%    General appearance normal   Vitals stable   Alert, oriented and in no acute distress     L shin 7mm scaly papule       Eyes: Conjunctivae/lids:Normal     ENT: Lips, buccal mucosa, tongue: normal    MSK:Normal    Cardiovascular: peripheral edema none    Pulm: Breathing Normal    Neuro/Psych: Orientation:Normal; Mood/Affect:Normal      A/P:  1. L shin basal cell carcinoma   MOHS:   Location    After PGACAC discussed with patient, decision for Mohs surgery was made. Indication for Mohs was Location. Patient confirmed the site with Dr. Villafuerte.  After anesthesia with LEC, the tumor was excised using standard Mohs technique in 1 stages(s).  CLEAR MARGINS OBTAINED and Final defect size was 1.1 cm.       REPAIR SECOND INTENT: We discussed the options for wound management in full with the patient including risks/benefits/possible outcomes. Decision made to allow the wound to heal by second intention. EBL minimal; complications none; wound care routine.  The patient was discharged in good condition and will return  in one month or prn for wound evaluation.  BENIGN LESIONS DISCUSSED WITH PATIENT:  I discussed the specifics of tumor, prognosis, and genetics of benign lesions.  I explained that treatment of these lesions would be purely cosmetic and not medically neccessary.  I discussed with patient different removal options including excision, cautery and /or laser.      Nature and genetics of benign skin lesions dicussed with patient.  Signs and Symptoms of skin cancer discussed with patient.  Patient encouraged to perform monthly skin exams.  UV precautions reviewed with patient.  Patient to follow up with Primary Care provider regarding elevated blood pressure.  Skin care regimen reviewed with patient: Eliminate harsh soaps, i.e. Dial, zest, irsih spring; Mild soaps such as Cetaphil or Dove sensitive skin, avoid hot or cold showers, aggressive use of emollients including vanicream, cetaphil or cerave discussed with patient.    Risks of non-melanoma skin cancer discussed with patient   Return to clinic 6 months

## 2019-03-26 NOTE — PATIENT INSTRUCTIONS
Open Wound Care     for Left superior shin        ? No strenuous activity for 48 hours    ? Take Tylenol as needed for discomfort.                                                .         ? Do not drink alcoholic beverages for 48 hours.    ? Keep the pressure bandage in place for 24 hours. If the bandage becomes blood tinged or loose, reinforce it with gauze and tape.        (Refer to the reverse side of this page for management of bleeding).    ? Remove bandage in 24 hours and begin wound care as follows:     1. Clean area with tap water using a Q tip or gauze pad, (shower / bathe normally)  2. Dry wound with Q tip or gauze pad  3. Apply Aquaphor, Vaseline, Polysporin or Bacitracin Ointment with a Q tip    Do NOT use Neosporin Ointment *  4. Cover the wound with a band-aid or nonstick gauze pad and paper tape.  5. Repeat wound care once a day until wound is completely healed.    It is an old wives tale that a wound heals better when it is exposed to air and allowed to dry out. The wound will heal faster with a better cosmetic result if it is kept moist with ointment and covered with a bandage.  Do not let the wound dry out.      Supplies Needed:                Qtips or gauze pads                Polysporin or Bacitracin Ointment                Bandaids or nonstick gauze pads and paper tape    Wound care kits and brown paper tape are available for purchase at   the pharmacy.    BLEEDIN. Use tightly rolled up gauze or cloth to apply direct pressure over the bandage for 20   minutes.  2. Reapply pressure for an additional 20 minutes if necessary  3. Call the office or go to the nearest emergency room if pressure fails to stop the bleeding.  4. Use additional gauze and tape to maintain pressure once the bleeding has stopped.  5. Begin wound care 24 hours after surgery as directed.                  WOUND HEALING    1. One week after surgery a pink / red halo will form around the outside of the wound.   This is new  skin.  2. The center of the wound will appear yellowish white and produce some drainage.  3. The pink halo will slowly migrate in toward the center of the wound until the wound is covered with new shiny pink skin.  4. There will be no more drainage when the wound is completely healed.  5. It will take six months to one year for the redness to fade.  6. The scar may be itchy, tight and sensitive to extreme temperatures for a year after the surgery.  7. Massaging the area several times a day for several minutes after the wound is completely healed will help the scar soften and normalize faster. Begin massage only after healing is complete.      In case of emergency call: Dr Villafuerte: [phone]     [facility]: [phone]    [facility]: [phone]

## 2019-09-26 ENCOUNTER — OFFICE VISIT (OUTPATIENT)
Dept: DERMATOLOGY | Facility: CLINIC | Age: 49
End: 2019-09-26
Payer: COMMERCIAL

## 2019-09-26 VITALS — DIASTOLIC BLOOD PRESSURE: 86 MMHG | OXYGEN SATURATION: 100 % | HEART RATE: 69 BPM | SYSTOLIC BLOOD PRESSURE: 134 MMHG

## 2019-09-26 DIAGNOSIS — Z80.8 FAMILY HISTORY OF MELANOMA: ICD-10-CM

## 2019-09-26 DIAGNOSIS — D22.9 MULTIPLE BENIGN NEVI: ICD-10-CM

## 2019-09-26 DIAGNOSIS — L81.4 LENTIGO: ICD-10-CM

## 2019-09-26 DIAGNOSIS — L82.1 SEBORRHEIC KERATOSIS: ICD-10-CM

## 2019-09-26 DIAGNOSIS — L30.9 DERMATITIS: Primary | ICD-10-CM

## 2019-09-26 DIAGNOSIS — Z85.828 HISTORY OF BASAL CELL CARCINOMA: ICD-10-CM

## 2019-09-26 DIAGNOSIS — D18.01 CHERRY ANGIOMA: ICD-10-CM

## 2019-09-26 PROCEDURE — 99214 OFFICE O/P EST MOD 30 MIN: CPT | Mod: 25 | Performed by: PHYSICIAN ASSISTANT

## 2019-09-26 PROCEDURE — 11104 PUNCH BX SKIN SINGLE LESION: CPT | Performed by: PHYSICIAN ASSISTANT

## 2019-09-26 PROCEDURE — 88312 SPECIAL STAINS GROUP 1: CPT | Mod: TC | Performed by: PHYSICIAN ASSISTANT

## 2019-09-26 PROCEDURE — 88305 TISSUE EXAM BY PATHOLOGIST: CPT | Mod: TC | Performed by: PHYSICIAN ASSISTANT

## 2019-09-26 RX ORDER — FLUOCINONIDE TOPICAL SOLUTION USP, 0.05% 0.5 MG/ML
SOLUTION TOPICAL
Qty: 60 ML | Refills: 2 | Status: SHIPPED | OUTPATIENT
Start: 2019-09-26 | End: 2020-11-19

## 2019-09-26 NOTE — NURSING NOTE
"Initial /86   Pulse 69   SpO2 100%  Estimated body mass index is 23.49 kg/m  as calculated from the following:    Height as of 1/31/19: 1.702 m (5' 7\").    Weight as of 1/31/19: 68 kg (150 lb). .    Leah Servin LPN    "

## 2019-09-26 NOTE — LETTER
9/26/2019         RE: Kalina Schreiber  [address] [facility] [address]        Dear Colleague,    Thank you for referring your patient, Kalina Schreiber, to the [facility]. Please see a copy of my visit note below.    Kalina Schreiber is a 48 year old year old female patient here today for skin check. She had BCC removed from left shin LOV. Reports that it has healed well. Denies any concerns today.  Remainder of the HPI, Meds, PMH, Allergies, FH, and SH was reviewed in chart.    Pertinent Hx:   History of BCC  Past Medical History:   Diagnosis Date     Allergic rhinitis due to other allergen      Basal cell carcinoma      Solitary cyst of breast      Twin pregnancy, delivered        Past Surgical History:   Procedure Laterality Date     COLONOSCOPY  2/23/2012    Procedure:COLONOSCOPY; Colonoscopy  ; Surgeon:BOB TY; Location:[facility]     COLONOSCOPY N/A 5/6/2016    Procedure: COLONOSCOPY;  Surgeon: Markus Grossman MD;  Location: [facility]     ESOPHAGOSCOPY, GASTROSCOPY, DUODENOSCOPY (EGD), COMBINED N/A 5/6/2016    Procedure: COMBINED ESOPHAGOSCOPY, GASTROSCOPY, DUODENOSCOPY (EGD);  Surgeon: Markus Grossman MD;  Location: [facility]     LASER SURGERY OF EYE  8/21/2008    Both eyes     SURGICAL HISTORY OF -       T&A     SURGICAL HISTORY OF -       Csection     SURGICAL HISTORY OF -       breast augmentation        Family History   Problem Relation Age of Onset     Alcohol/Drug Mother      Cancer Mother         skin, COD: lung, liver, bone cancer     Melanoma Mother      Diabetes Mother         Post transplant onset     Alcohol/Drug Father      Hypertension Father      Cancer Maternal Grandmother         lung/liver     Asthma Maternal Grandmother      Cancer Maternal Grandfather         skin     Cancer - colorectal Maternal Grandfather      Melanoma Maternal Grandfather      C.A.D. No family hx of      Cerebrovascular Disease No family hx of      Breast Cancer No family hx of       Prostate Cancer No family hx of        Social History     Socioeconomic History     Marital status:      Spouse name: Not on file     Number of children: Not on file     Years of education: Not on file     Highest education level: Not on file   Occupational History     Employer: SELF   Social Needs     Financial resource strain: Not on file     Food insecurity:     Worry: Not on file     Inability: Not on file     Transportation needs:     Medical: Not on file     Non-medical: Not on file   Tobacco Use     Smoking status: Never Smoker     Smokeless tobacco: Never Used   Substance and Sexual Activity     Alcohol use: Yes     Comment: 1-2 drinks per week     Drug use: No     Sexual activity: Yes     Partners: Male     Birth control/protection: Surgical     Comment: vas   Lifestyle     Physical activity:     Days per week: Not on file     Minutes per session: Not on file     Stress: Not on file   Relationships     Social connections:     Talks on phone: Not on file     Gets together: Not on file     Attends Jewish service: Not on file     Active member of club or organization: Not on file     Attends meetings of clubs or organizations: Not on file     Relationship status: Not on file     Intimate partner violence:     Fear of current or ex partner: Not on file     Emotionally abused: Not on file     Physically abused: Not on file     Forced sexual activity: Not on file   Other Topics Concern     Parent/sibling w/ CABG, MI or angioplasty before 65F 55M? 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      Surrogate for twins       Outpatient Encounter Medications as of 9/26/2019   Medication Sig Dispense Refill     ferrous sulfate (IRON) 325 (65 FE) MG tablet Take 1 tablet (325 mg) by mouth 2 times daily 180 tablet 3     Oxymetazoline HCl (NASAL SPRAY NA) Nasoquort 1 spray in each nostril daily when needed       UNKNOWN TO PATIENT Allergy Eye Drops, 1 drop in each eye  daily PRN       fexofenadine (ALLEGRA) 180 MG tablet Take 1 tablet (180 mg) by mouth daily (Patient not taking: Reported on 9/26/2019) 30 tablet 0     ibuprofen (ADVIL) 200 MG capsule Take 200 mg by mouth every 4 hours as needed.       SUMAtriptan (IMITREX) 25 MG tablet Take 1-2 tablets (25-50 mg) by mouth at onset of headache for migraine May repeat dose in 2 hours.  Do not exceed 200 mg in 24 hours (Patient not taking: Reported on 9/26/2019) 18 tablet 1     [DISCONTINUED] order for DME Equipment being ordered: Cast shoe (Patient not taking: Reported on 3/14/2019) 1 Device 0     No facility-administered encounter medications on file as of 9/26/2019.              Review Of Systems  Skin: As above  Eyes: negative  Ears/Nose/Throat: negative  Respiratory: No shortness of breath, dyspnea on exertion, cough, or hemoptysis  Cardiovascular: negative  Gastrointestinal: negative  Genitourinary: negative  Musculoskeletal: negative  Neurologic: negative  Psychiatric: negative  Hematologic/Lymphatic/Immunologic: negative  Endocrine: negative      O:   NAD, WDWN, Alert & Oriented, Mood & Affect wnl, Vitals stable   Here today with her     /86   Pulse 69   SpO2 100%    General appearance normal   Vitals stable   Alert, oriented and in no acute distress   Mild perifollicular inflammation, accentuation of hair follicle on frontal scalp, minimal scarring seen   Stuck on papules and brown macules on trunk and ext   Red papules on trunk  Brown papules and macules with regular pigment network and borders on torso and extremities  Well healed scar on left leg   The remainder of the detailed exam was unremarkable; the following areas were examined:  scalp/hair, conjunctiva/lids, face, neck, lips/teeth, oral mucosa/gingiva, chest, back, abdomen, buttocks, digits/nails, RUE, LUE, RLE, LLE.      Eyes: Conjunctivae/lids:Normal     ENT: Lips: normal    MSK:Normal    Cardiovascular: peripheral edema none    Pulm: Breathing  Normal    Neuro/Psych: Orientation:Normal; Mood/Affect:Normal  A/P:  1. R/O frontal fibrosing alopecia  4 mm PUNCH BIOPSY SENT OUT:  After consent, anesthesia with LEC and prep, punch performed, closed wound with 2, 4-0 ethilon and specimen sent out for permanent section histology.  No complications and routine wound care. Patient told to call our office in 1-2 weeks for result.  Return in one week for suture removal   Apply lidex solution as needed.   2. History of BCC   No evidence of recurrence.   3. Seborrheic keratosis, lentigo, angioma, benign nevi   BENIGN LESIONS DISCUSSED WITH PATIENT:  I discussed the specifics of tumor, prognosis, and genetics of benign lesions.  I explained that treatment of these lesions would be purely cosmetic and not medically neccessary.  I discussed with patient different removal options including excision, cautery and /or laser.      Nature and genetics of benign skin lesions dicussed with patient.  Signs and Symptoms of skin cancer discussed with patient.  ABCDEs of melanoma reviewed with patient.  Patient encouraged to perform monthly skin exams.  UV precautions reviewed with patient.  Risks of non-melanoma skin cancer discussed with patient  Return to clinic in 2 months.       Again, thank you for allowing me to participate in the care of your patient.        Sincerely,        Talisha Aguilar PA-C

## 2019-09-26 NOTE — PATIENT INSTRUCTIONS
Wound Care Instructions     FOR SUPERFICIAL WOUNDS     [facility] [phone]    [facility] [phone]  [place]p                       AFTER 24 HOURS YOU SHOULD REMOVE THE BANDAGE AND BEGIN DAILY DRESSING CHANGES AS FOLLOWS:     1) Remove Dressing.     2) Clean and dry the area with tap water using a Q-tip or sterile gauze pad.     3) Apply Vaseline, Aquaphor, Polysporin ointment or Bacitracin ointment over entire wound.  Do NOT use Neosporin ointment.     4) Cover the wound with a band-aid, or a sterile non-stick gauze pad and micropore paper tape      REPEAT THESE INSTRUCTIONS AT LEAST ONCE A DAY UNTIL THE WOUND HAS COMPLETELY HEALED.    It is an old wives tale that a wound heals better when it is exposed to air and allowed to dry out. The wound will heal faster with a better cosmetic result if it is kept moist with ointment and covered with a bandage.    **Do not let the wound dry out.**      Supplies Needed:      *Cotton tipped applicators (Q-tips)    *Polysporin Ointment or Bacitracin Ointment (NOT NEOSPORIN)    *Band-aids or non-stick gauze pads and micropore paper tape.      PATIENT INFORMATION:    During the healing process you will notice a number of changes. All wounds develop a small halo of redness surrounding the wound.  This means healing is occurring. Severe itching with extensive redness usually indicates sensitivity to the ointment or bandage tape used to dress the wound.  You should call our office if this develops.      Swelling  and/or discoloration around your surgical site is common, particularly when performed around the eye.    All wounds normally drain.  The larger the wound the more drainage there will be.  After 7-10 days, you will notice the wound beginning to shrink and new skin will begin to grow.  The wound is healed when you can see skin has formed over the entire area.  A healed wound has a healthy, shiny look to the surface and is red to dark pink in  color to normalize.  Wounds may take approximately 4-6 weeks to heal.  Larger wounds may take 6-8 weeks.  After the wound is healed you may discontinue dressing changes.    You may experience a sensation of tightness as your wound heals. This is normal and will gradually subside.    Your healed wound may be sensitive to temperature changes. This sensitivity improves with time, but if you re having a lot of discomfort, try to avoid temperature extremes.    Patients frequently experience itching after their wound appears to have healed because of the continue healing under the skin.  Plain Vaseline will help relieve the itching.        POSSIBLE COMPLICATIONS    BLEEDIN. Leave the bandage in place.  2. Use tightly rolled up gauze or a cloth to apply direct pressure over the bandage for 30  minutes.  3. Reapply pressure for an additional 30 minutes if necessary  4. Use additional gauze and tape to maintain pressure once the bleeding has stopped.

## 2019-09-26 NOTE — PROGRESS NOTES
Kalina Schreiber is a 48 year old year old female patient here today for skin check. She had BCC removed from left shin LOV. Reports that it has healed well. Denies any concerns today.  Remainder of the HPI, Meds, PMH, Allergies, FH, and SH was reviewed in chart.    Pertinent Hx:   History of BCC  Past Medical History:   Diagnosis Date     Allergic rhinitis due to other allergen      Basal cell carcinoma      Solitary cyst of breast      Twin pregnancy, delivered        Past Surgical History:   Procedure Laterality Date     COLONOSCOPY  2/23/2012    Procedure:COLONOSCOPY; Colonoscopy  ; Surgeon:BOB TY; Location:[facility]     COLONOSCOPY N/A 5/6/2016    Procedure: COLONOSCOPY;  Surgeon: Markus Grossman MD;  Location: [facility]     ESOPHAGOSCOPY, GASTROSCOPY, DUODENOSCOPY (EGD), COMBINED N/A 5/6/2016    Procedure: COMBINED ESOPHAGOSCOPY, GASTROSCOPY, DUODENOSCOPY (EGD);  Surgeon: Markus Grossman MD;  Location: [facility]     LASER SURGERY OF EYE  8/21/2008    Both eyes     SURGICAL HISTORY OF -       T&A     SURGICAL HISTORY OF -       Csection     SURGICAL HISTORY OF -       breast augmentation        Family History   Problem Relation Age of Onset     Alcohol/Drug Mother      Cancer Mother         skin, COD: lung, liver, bone cancer     Melanoma Mother      Diabetes Mother         Post transplant onset     Alcohol/Drug Father      Hypertension Father      Cancer Maternal Grandmother         lung/liver     Asthma Maternal Grandmother      Cancer Maternal Grandfather         skin     Cancer - colorectal Maternal Grandfather      Melanoma Maternal Grandfather      C.A.D. No family hx of      Cerebrovascular Disease No family hx of      Breast Cancer No family hx of      Prostate Cancer No family hx of        Social History     Socioeconomic History     Marital status:      Spouse name: Not on file     Number of children: Not on file     Years of education: Not on file     Highest education level: Not on  file   Occupational History     Employer: SELF   Social Needs     Financial resource strain: Not on file     Food insecurity:     Worry: Not on file     Inability: Not on file     Transportation needs:     Medical: Not on file     Non-medical: Not on file   Tobacco Use     Smoking status: Never Smoker     Smokeless tobacco: Never Used   Substance and Sexual Activity     Alcohol use: Yes     Comment: 1-2 drinks per week     Drug use: No     Sexual activity: Yes     Partners: Male     Birth control/protection: Surgical     Comment: vas   Lifestyle     Physical activity:     Days per week: Not on file     Minutes per session: Not on file     Stress: Not on file   Relationships     Social connections:     Talks on phone: Not on file     Gets together: Not on file     Attends Roman Catholic service: Not on file     Active member of club or organization: Not on file     Attends meetings of clubs or organizations: Not on file     Relationship status: Not on file     Intimate partner violence:     Fear of current or ex partner: Not on file     Emotionally abused: Not on file     Physically abused: Not on file     Forced sexual activity: Not on file   Other Topics Concern     Parent/sibling w/ CABG, MI or angioplasty before 65F 55M? 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      Surrogate for twins       Outpatient Encounter Medications as of 9/26/2019   Medication Sig Dispense Refill     ferrous sulfate (IRON) 325 (65 FE) MG tablet Take 1 tablet (325 mg) by mouth 2 times daily 180 tablet 3     Oxymetazoline HCl (NASAL SPRAY NA) Nasoquort 1 spray in each nostril daily when needed       UNKNOWN TO PATIENT Allergy Eye Drops, 1 drop in each eye daily PRN       fexofenadine (ALLEGRA) 180 MG tablet Take 1 tablet (180 mg) by mouth daily (Patient not taking: Reported on 9/26/2019) 30 tablet 0     ibuprofen (ADVIL) 200 MG capsule Take 200 mg by mouth every 4 hours as needed.       SUMAtriptan  (IMITREX) 25 MG tablet Take 1-2 tablets (25-50 mg) by mouth at onset of headache for migraine May repeat dose in 2 hours.  Do not exceed 200 mg in 24 hours (Patient not taking: Reported on 9/26/2019) 18 tablet 1     [DISCONTINUED] order for DME Equipment being ordered: Cast shoe (Patient not taking: Reported on 3/14/2019) 1 Device 0     No facility-administered encounter medications on file as of 9/26/2019.              Review Of Systems  Skin: As above  Eyes: negative  Ears/Nose/Throat: negative  Respiratory: No shortness of breath, dyspnea on exertion, cough, or hemoptysis  Cardiovascular: negative  Gastrointestinal: negative  Genitourinary: negative  Musculoskeletal: negative  Neurologic: negative  Psychiatric: negative  Hematologic/Lymphatic/Immunologic: negative  Endocrine: negative      O:   NAD, WDWN, Alert & Oriented, Mood & Affect wnl, Vitals stable   Here today with her     /86   Pulse 69   SpO2 100%    General appearance normal   Vitals stable   Alert, oriented and in no acute distress   Mild perifollicular inflammation, accentuation of hair follicle on frontal scalp, minimal scarring seen   Stuck on papules and brown macules on trunk and ext   Red papules on trunk  Brown papules and macules with regular pigment network and borders on torso and extremities  Well healed scar on left leg   The remainder of the detailed exam was unremarkable; the following areas were examined:  scalp/hair, conjunctiva/lids, face, neck, lips/teeth, oral mucosa/gingiva, chest, back, abdomen, buttocks, digits/nails, RUE, LUE, RLE, LLE.      Eyes: Conjunctivae/lids:Normal     ENT: Lips: normal    MSK:Normal    Cardiovascular: peripheral edema none    Pulm: Breathing Normal    Neuro/Psych: Orientation:Normal; Mood/Affect:Normal  A/P:  1. R/O frontal fibrosing alopecia  4 mm PUNCH BIOPSY SENT OUT:  After consent, anesthesia with LEC and prep, punch performed, closed wound with 2, 4-0 ethilon and specimen sent out  for permanent section histology.  No complications and routine wound care. Patient told to call our office in 1-2 weeks for result.  Return in one week for suture removal   Apply lidex solution as needed.   2. History of BCC   No evidence of recurrence.   3. Seborrheic keratosis, lentigo, angioma, benign nevi   BENIGN LESIONS DISCUSSED WITH PATIENT:  I discussed the specifics of tumor, prognosis, and genetics of benign lesions.  I explained that treatment of these lesions would be purely cosmetic and not medically neccessary.  I discussed with patient different removal options including excision, cautery and /or laser.      Nature and genetics of benign skin lesions dicussed with patient.  Signs and Symptoms of skin cancer discussed with patient.  ABCDEs of melanoma reviewed with patient.  Patient encouraged to perform monthly skin exams.  UV precautions reviewed with patient.  Risks of non-melanoma skin cancer discussed with patient  Return to clinic in 2 months.

## 2019-10-04 ENCOUNTER — OFFICE VISIT (OUTPATIENT)
Dept: DERMATOLOGY | Facility: CLINIC | Age: 49
End: 2019-10-04
Payer: COMMERCIAL

## 2019-10-04 DIAGNOSIS — Z48.01 ENCOUNTER FOR CHANGE OR REMOVAL OF SURGICAL WOUND DRESSING: Primary | ICD-10-CM

## 2019-10-04 LAB — COPATH REPORT: NORMAL

## 2019-10-04 PROCEDURE — 99207 ZZC NO CHARGE NURSE ONLY: CPT

## 2019-10-04 NOTE — PATIENT INSTRUCTIONS
Wound Care Instructions     FOR SUPERFICIAL WOUNDS     [facility] [phone]    [facility] [phone]                       AFTER 24 HOURS YOU SHOULD REMOVE THE BANDAGE AND BEGIN DAILY DRESSING CHANGES AS FOLLOWS:     1) Remove Dressing.     2) Clean and dry the area with tap water using a Q-tip or sterile gauze pad.     3) Apply Vaseline, Aquaphor, Polysporin ointment or Bacitracin ointment over entire wound.  Do NOT use Neosporin ointment.     4) Cover the wound with a band-aid, or a sterile non-stick gauze pad and micropore paper tape      REPEAT THESE INSTRUCTIONS AT LEAST ONCE A DAY UNTIL THE WOUND HAS COMPLETELY HEALED.    It is an old wives tale that a wound heals better when it is exposed to air and allowed to dry out. The wound will heal faster with a better cosmetic result if it is kept moist with ointment and covered with a bandage.    **Do not let the wound dry out.**      Supplies Needed:      *Cotton tipped applicators (Q-tips)    *Polysporin Ointment or Bacitracin Ointment (NOT NEOSPORIN)    *Band-aids or non-stick gauze pads and micropore paper tape.      PATIENT INFORMATION:    During the healing process you will notice a number of changes. All wounds develop a small halo of redness surrounding the wound.  This means healing is occurring. Severe itching with extensive redness usually indicates sensitivity to the ointment or bandage tape used to dress the wound.  You should call our office if this develops.      Swelling  and/or discoloration around your surgical site is common, particularly when performed around the eye.    All wounds normally drain.  The larger the wound the more drainage there will be.  After 7-10 days, you will notice the wound beginning to shrink and new skin will begin to grow.  The wound is healed when you can see skin has formed over the entire area.  A healed wound has a healthy, shiny look to the surface and is red to dark pink in color  to normalize.  Wounds may take approximately 4-6 weeks to heal.  Larger wounds may take 6-8 weeks.  After the wound is healed you may discontinue dressing changes.    You may experience a sensation of tightness as your wound heals. This is normal and will gradually subside.    Your healed wound may be sensitive to temperature changes. This sensitivity improves with time, but if you re having a lot of discomfort, try to avoid temperature extremes.    Patients frequently experience itching after their wound appears to have healed because of the continue healing under the skin.  Plain Vaseline will help relieve the itching.        POSSIBLE COMPLICATIONS    BLEEDIN. Leave the bandage in place.  2. Use tightly rolled up gauze or a cloth to apply direct pressure over the bandage for 30  minutes.  3. Reapply pressure for an additional 30 minutes if necessary  4. Use additional gauze and tape to maintain pressure once the bleeding has stopped.

## 2019-10-04 NOTE — PROGRESS NOTES
Kalina Schreiber presents to the clinic for removal of sutures. The patient has had sutures in place for 8 days. There has been no patient reported signs or symptoms of infection or drainage. 2 sutures are seen and located on the right frontal scalp. All sutures were easily removed today. Routine wound care discussed.The patient will follow up as needed.    Leah Servin LPN

## 2019-10-16 ENCOUNTER — HOSPITAL ENCOUNTER (OUTPATIENT)
Dept: MAMMOGRAPHY | Facility: CLINIC | Age: 49
Discharge: HOME | End: 2019-10-16
Attending: NURSE PRACTITIONER | Admitting: NURSE PRACTITIONER
Payer: COMMERCIAL

## 2019-10-16 DIAGNOSIS — Z12.31 VISIT FOR SCREENING MAMMOGRAM: ICD-10-CM

## 2019-10-16 PROCEDURE — 77067 SCR MAMMO BI INCL CAD: CPT

## 2019-11-03 ENCOUNTER — HEALTH MAINTENANCE LETTER (OUTPATIENT)
Age: 49
End: 2019-11-03

## 2019-12-03 ENCOUNTER — OFFICE VISIT (OUTPATIENT)
Dept: DERMATOLOGY | Facility: CLINIC | Age: 49
End: 2019-12-03
Payer: COMMERCIAL

## 2019-12-03 ENCOUNTER — TRANSFERRED RECORDS (OUTPATIENT)
Dept: HEALTH INFORMATION MANAGEMENT | Facility: CLINIC | Age: 49
End: 2019-12-03

## 2019-12-03 VITALS — DIASTOLIC BLOOD PRESSURE: 61 MMHG | SYSTOLIC BLOOD PRESSURE: 108 MMHG | HEART RATE: 69 BPM | OXYGEN SATURATION: 100 %

## 2019-12-03 DIAGNOSIS — L66.12 FRONTAL FIBROSING ALOPECIA: Primary | ICD-10-CM

## 2019-12-03 PROCEDURE — 99213 OFFICE O/P EST LOW 20 MIN: CPT | Performed by: PHYSICIAN ASSISTANT

## 2019-12-03 NOTE — LETTER
12/3/2019         RE: Kalina Schreiber  [address] [facility] [address]        Dear Colleague,    Thank you for referring your patient, Kalina Schreiber, to the [facility]. Please see a copy of my visit note below.    Kalina Schreiber is a 49 year old year old female patient here today for recheck frontal fibrosing alopecia.  Patient is asymptomatic. She denies any hair loss seen. She reports she is using lidex twice weekly to frontal scalp.  Patient has no other skin complaints today.  Remainder of the HPI, Meds, PMH, Allergies, FH, and SH was reviewed in chart.  Past Medical History:   Diagnosis Date     Allergic rhinitis due to other allergen      Basal cell carcinoma      Solitary cyst of breast      Twin pregnancy, delivered        Past Surgical History:   Procedure Laterality Date     COLONOSCOPY  2/23/2012    Procedure:COLONOSCOPY; Colonoscopy  ; Surgeon:BOB TY; Location:[facility]     COLONOSCOPY N/A 5/6/2016    Procedure: COLONOSCOPY;  Surgeon: Markus Grossman MD;  Location: [facility]     ESOPHAGOSCOPY, GASTROSCOPY, DUODENOSCOPY (EGD), COMBINED N/A 5/6/2016    Procedure: COMBINED ESOPHAGOSCOPY, GASTROSCOPY, DUODENOSCOPY (EGD);  Surgeon: Markus Grossman MD;  Location: [facility]     LASER SURGERY OF EYE  8/21/2008    Both eyes     SURGICAL HISTORY OF -       T&A     SURGICAL HISTORY OF -       Csection     SURGICAL HISTORY OF -       breast augmentation        Family History   Problem Relation Age of Onset     Alcohol/Drug Mother      Cancer Mother         skin, COD: lung, liver, bone cancer     Melanoma Mother      Diabetes Mother         Post transplant onset     Alcohol/Drug Father      Hypertension Father      Cancer Maternal Grandmother         lung/liver     Asthma Maternal Grandmother      Cancer Maternal Grandfather         skin     Cancer - colorectal Maternal Grandfather      Melanoma Maternal Grandfather      C.A.D. No family hx of      Cerebrovascular Disease  No family hx of      Breast Cancer No family hx of      Prostate Cancer No family hx of        Social History     Socioeconomic History     Marital status:      Spouse name: Not on file     Number of children: Not on file     Years of education: Not on file     Highest education level: Not on file   Occupational History     Employer: SELF   Social Needs     Financial resource strain: Not on file     Food insecurity:     Worry: Not on file     Inability: Not on file     Transportation needs:     Medical: Not on file     Non-medical: Not on file   Tobacco Use     Smoking status: Never Smoker     Smokeless tobacco: Never Used   Substance and Sexual Activity     Alcohol use: Yes     Comment: 1-2 drinks per week     Drug use: No     Sexual activity: Yes     Partners: Male     Birth control/protection: Surgical     Comment: vas   Lifestyle     Physical activity:     Days per week: Not on file     Minutes per session: Not on file     Stress: Not on file   Relationships     Social connections:     Talks on phone: Not on file     Gets together: Not on file     Attends Gnosticist service: Not on file     Active member of club or organization: Not on file     Attends meetings of clubs or organizations: Not on file     Relationship status: Not on file     Intimate partner violence:     Fear of current or ex partner: Not on file     Emotionally abused: Not on file     Physically abused: Not on file     Forced sexual activity: Not on file   Other Topics Concern     Parent/sibling w/ CABG, MI or angioplasty before 65F 55M? 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      Surrogate for twins       Outpatient Encounter Medications as of 12/3/2019   Medication Sig Dispense Refill     ferrous sulfate (IRON) 325 (65 FE) MG tablet Take 1 tablet (325 mg) by mouth 2 times daily 180 tablet 3     fexofenadine (ALLEGRA) 180 MG tablet Take 1 tablet (180 mg) by mouth daily 30 tablet 0      fluocinonide (LIDEX) 0.05 % external solution Apply twice daily as needed to frontal scalp. 60 mL 2     ibuprofen (ADVIL) 200 MG capsule Take 200 mg by mouth every 4 hours as needed.       Oxymetazoline HCl (NASAL SPRAY NA) Nasoquort 1 spray in each nostril daily when needed       SUMAtriptan (IMITREX) 25 MG tablet Take 1-2 tablets (25-50 mg) by mouth at onset of headache for migraine May repeat dose in 2 hours.  Do not exceed 200 mg in 24 hours 18 tablet 1     UNKNOWN TO PATIENT Allergy Eye Drops, 1 drop in each eye daily PRN       No facility-administered encounter medications on file as of 12/3/2019.              Review Of Systems  Skin: As above  Eyes: negative  Ears/Nose/Throat: negative  Respiratory: No shortness of breath, dyspnea on exertion, cough, or hemoptysis  Cardiovascular: negative  Gastrointestinal: negative  Genitourinary: negative  Musculoskeletal: negative  Neurologic: negative  Psychiatric: negative  Hematologic/Lymphatic/Immunologic: negative  Endocrine: negative      O:   NAD, WDWN, Alert & Oriented, Mood & Affect wnl, Vitals stable   Here today alone   /61 (BP Location: Left arm, Patient Position: Sitting, Cuff Size: Adult Regular)   Pulse 69   SpO2 100%    General appearance normal   Vitals stable   Alert, oriented and in no acute distress     Minimal scarring noted on frontal scalp  Mild inflammation with perifollicular scaling on frontal scalp      Eyes: Conjunctivae/lids:Normal     ENT: Lips: normal    MSK:Normal    Pulm: Breathing Normal    Neuro/Psych: Orientation:Alert and Orientedx3 ; Mood/Affect:normal   A/P:  1. Frontal Fibrosing Alopecia  Mild flaring increase lidex solution to three times weekly once under control can decrease to 1-2 weekly.   Discussed if having issues controlling we can try intralesional steroid vs plaquenil.   Patient would like to continue topicals for now.   Return in 3-4 months or sooner if needed.     Again, thank you for allowing me to participate  in the care of your patient.        Sincerely,        Talisha Aguilar PA-C

## 2019-12-03 NOTE — NURSING NOTE
"Initial /61 (BP Location: Left arm, Patient Position: Sitting, Cuff Size: Adult Regular)   Pulse 69   SpO2 100%  Estimated body mass index is 23.49 kg/m  as calculated from the following:    Height as of 1/31/19: 1.702 m (5' 7\").    Weight as of 1/31/19: 68 kg (150 lb). .      "

## 2019-12-04 NOTE — PROGRESS NOTES
Kalina Schreiber is a 49 year old year old female patient here today for recheck frontal fibrosing alopecia.  Patient is asymptomatic. She denies any hair loss seen. She reports she is using lidex twice weekly to frontal scalp.  Patient has no other skin complaints today.  Remainder of the HPI, Meds, PMH, Allergies, FH, and SH was reviewed in chart.  Past Medical History:   Diagnosis Date     Allergic rhinitis due to other allergen      Basal cell carcinoma      Solitary cyst of breast      Twin pregnancy, delivered        Past Surgical History:   Procedure Laterality Date     COLONOSCOPY  2/23/2012    Procedure:COLONOSCOPY; Colonoscopy  ; Surgeon:BOB TY; Location:[facility]     COLONOSCOPY N/A 5/6/2016    Procedure: COLONOSCOPY;  Surgeon: Mrakus Grossman MD;  Location: [facility]     ESOPHAGOSCOPY, GASTROSCOPY, DUODENOSCOPY (EGD), COMBINED N/A 5/6/2016    Procedure: COMBINED ESOPHAGOSCOPY, GASTROSCOPY, DUODENOSCOPY (EGD);  Surgeon: Markus Grossman MD;  Location: [facility]     LASER SURGERY OF EYE  8/21/2008    Both eyes     SURGICAL HISTORY OF -       T&A     SURGICAL HISTORY OF -       Csection     SURGICAL HISTORY OF -       breast augmentation        Family History   Problem Relation Age of Onset     Alcohol/Drug Mother      Cancer Mother         skin, COD: lung, liver, bone cancer     Melanoma Mother      Diabetes Mother         Post transplant onset     Alcohol/Drug Father      Hypertension Father      Cancer Maternal Grandmother         lung/liver     Asthma Maternal Grandmother      Cancer Maternal Grandfather         skin     Cancer - colorectal Maternal Grandfather      Melanoma Maternal Grandfather      C.A.D. No family hx of      Cerebrovascular Disease No family hx of      Breast Cancer No family hx of      Prostate Cancer No family hx of        Social History     Socioeconomic History     Marital status:      Spouse name: Not on file     Number of children: Not on file     Years of  education: Not on file     Highest education level: Not on file   Occupational History     Employer: SELF   Social Needs     Financial resource strain: Not on file     Food insecurity:     Worry: Not on file     Inability: Not on file     Transportation needs:     Medical: Not on file     Non-medical: Not on file   Tobacco Use     Smoking status: Never Smoker     Smokeless tobacco: Never Used   Substance and Sexual Activity     Alcohol use: Yes     Comment: 1-2 drinks per week     Drug use: No     Sexual activity: Yes     Partners: Male     Birth control/protection: Surgical     Comment: vas   Lifestyle     Physical activity:     Days per week: Not on file     Minutes per session: Not on file     Stress: Not on file   Relationships     Social connections:     Talks on phone: Not on file     Gets together: Not on file     Attends Mu-ism service: Not on file     Active member of club or organization: Not on file     Attends meetings of clubs or organizations: Not on file     Relationship status: Not on file     Intimate partner violence:     Fear of current or ex partner: Not on file     Emotionally abused: Not on file     Physically abused: Not on file     Forced sexual activity: Not on file   Other Topics Concern     Parent/sibling w/ CABG, MI or angioplasty before 65F 55M? 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      Surrogate for twins       Outpatient Encounter Medications as of 12/3/2019   Medication Sig Dispense Refill     ferrous sulfate (IRON) 325 (65 FE) MG tablet Take 1 tablet (325 mg) by mouth 2 times daily 180 tablet 3     fexofenadine (ALLEGRA) 180 MG tablet Take 1 tablet (180 mg) by mouth daily 30 tablet 0     fluocinonide (LIDEX) 0.05 % external solution Apply twice daily as needed to frontal scalp. 60 mL 2     ibuprofen (ADVIL) 200 MG capsule Take 200 mg by mouth every 4 hours as needed.       Oxymetazoline HCl (NASAL SPRAY NA) Nasoquort 1 spray in each  nostril daily when needed       SUMAtriptan (IMITREX) 25 MG tablet Take 1-2 tablets (25-50 mg) by mouth at onset of headache for migraine May repeat dose in 2 hours.  Do not exceed 200 mg in 24 hours 18 tablet 1     UNKNOWN TO PATIENT Allergy Eye Drops, 1 drop in each eye daily PRN       No facility-administered encounter medications on file as of 12/3/2019.              Review Of Systems  Skin: As above  Eyes: negative  Ears/Nose/Throat: negative  Respiratory: No shortness of breath, dyspnea on exertion, cough, or hemoptysis  Cardiovascular: negative  Gastrointestinal: negative  Genitourinary: negative  Musculoskeletal: negative  Neurologic: negative  Psychiatric: negative  Hematologic/Lymphatic/Immunologic: negative  Endocrine: negative      O:   NAD, WDWN, Alert & Oriented, Mood & Affect wnl, Vitals stable   Here today alone   /61 (BP Location: Left arm, Patient Position: Sitting, Cuff Size: Adult Regular)   Pulse 69   SpO2 100%    General appearance normal   Vitals stable   Alert, oriented and in no acute distress     Minimal scarring noted on frontal scalp  Mild inflammation with perifollicular scaling on frontal scalp      Eyes: Conjunctivae/lids:Normal     ENT: Lips: normal    MSK:Normal    Pulm: Breathing Normal    Neuro/Psych: Orientation:Alert and Orientedx3 ; Mood/Affect:normal   A/P:  1. Frontal Fibrosing Alopecia  Mild flaring increase lidex solution to three times weekly once under control can decrease to 1-2 weekly.   Discussed if having issues controlling we can try intralesional steroid vs plaquenil.   Patient would like to continue topicals for now.   Return in 3-4 months or sooner if needed.

## 2020-05-31 ENCOUNTER — APPOINTMENT (OUTPATIENT)
Dept: GENERAL RADIOLOGY | Facility: CLINIC | Age: 50
End: 2020-05-31
Attending: PHYSICIAN ASSISTANT
Payer: COMMERCIAL

## 2020-05-31 ENCOUNTER — HOSPITAL ENCOUNTER (EMERGENCY)
Facility: CLINIC | Age: 50
Discharge: HOME | End: 2020-05-31
Attending: PHYSICIAN ASSISTANT | Admitting: PHYSICIAN ASSISTANT
Payer: COMMERCIAL

## 2020-05-31 VITALS
TEMPERATURE: 98.5 F | SYSTOLIC BLOOD PRESSURE: 142 MMHG | OXYGEN SATURATION: 100 % | BODY MASS INDEX: 23.49 KG/M2 | RESPIRATION RATE: 16 BRPM | WEIGHT: 150 LBS | DIASTOLIC BLOOD PRESSURE: 66 MMHG

## 2020-05-31 DIAGNOSIS — S92.354A NONDISPLACED FRACTURE OF FIFTH METATARSAL BONE, RIGHT FOOT, INITIAL ENCOUNTER FOR CLOSED FRACTURE: ICD-10-CM

## 2020-05-31 PROCEDURE — 99283 EMERGENCY DEPT VISIT LOW MDM: CPT | Mod: 25 | Performed by: PHYSICIAN ASSISTANT

## 2020-05-31 PROCEDURE — 99284 EMERGENCY DEPT VISIT MOD MDM: CPT | Mod: 25 | Performed by: PHYSICIAN ASSISTANT

## 2020-05-31 PROCEDURE — 28470 CLTX METATARSAL FX WO MNP EA: CPT | Mod: 54 | Performed by: PHYSICIAN ASSISTANT

## 2020-05-31 PROCEDURE — 28470 CLTX METATARSAL FX WO MNP EA: CPT | Mod: RT | Performed by: PHYSICIAN ASSISTANT

## 2020-05-31 PROCEDURE — 73630 X-RAY EXAM OF FOOT: CPT | Mod: RT

## 2020-05-31 PROCEDURE — 73610 X-RAY EXAM OF ANKLE: CPT | Mod: RT

## 2020-05-31 NOTE — ED AVS SNAPSHOT
[facility]  [address] [place] [address]  Phone:  [phone]  Fax:  [phone]                                    Kalina Schreiber   MRN: [id number]    Department:  [facility] Department   Date of Visit:  5/31/2020           After Visit Summary Signature Page    I have received my discharge instructions, and my questions have been answered. I have discussed any challenges I see with this plan with the nurse or doctor.    ..........................................................................................................................................  Patient/Patient Representative Signature      ..........................................................................................................................................  Patient Representative Print Name and Relationship to Patient    ..................................................               ................................................  Date                                   Time    ..........................................................................................................................................  Reviewed by Signature/Title    ...................................................              ..............................................  Date                                               Time          22EPIC Rev 08/18

## 2020-05-31 NOTE — ED TRIAGE NOTES
Pt here after she tripped on the vacuum rolling her ankle laterally. Pt states that she did the same thing to her L ankle and broke it in Jan 2007.

## 2020-05-31 NOTE — ED PROVIDER NOTES
"  History     Chief Complaint   Patient presents with     Foot Injury     R foot     HPI  Kalina Schreiber is a 49 year old female who presents with complaints of right foot pain since accidentally stepping on her vacuum hose and twisting her right foot and ankle.  Patient describes hearing and feeling a \"crack\" to her right lateral foot.  She immediately developed swelling and pain overlying this right lateral foot.  Patient states her symptoms feel reminiscent of when she broke her left foot in the exact same location with the same mechanism of injury.        Allergies:  Allergies   Allergen Reactions     Nkda [No Known Drug Allergies]        Problem List:    Patient Active Problem List    Diagnosis Date Noted     Iron deficiency anemia due to chronic blood loss 05/09/2016     Priority: Medium     Migraine with aura 08/13/2015     Priority: Medium     GERD (gastroesophageal reflux disease) 01/05/2015     Priority: Medium     Seasonal allergic rhinitis 05/09/2012     Priority: Medium     CARDIOVASCULAR SCREENING; LDL GOAL LESS THAN 160 10/31/2010     Priority: Medium     FAMILY HISTORY OF COLONIC POLYPS 04/04/2008     Priority: Medium     Grandfather with colon cancer 50's  Mother with polyps - age 40's - non-cancerous  Planning to repeat colonoscopy at age 40          Past Medical History:    Past Medical History:   Diagnosis Date     Allergic rhinitis due to other allergen      Basal cell carcinoma      Solitary cyst of breast      Twin pregnancy, delivered        Past Surgical History:    Past Surgical History:   Procedure Laterality Date     COLONOSCOPY  2/23/2012    Procedure:COLONOSCOPY; Colonoscopy  ; Surgeon:BOB TY; Location:[facility]     COLONOSCOPY N/A 5/6/2016    Procedure: COLONOSCOPY;  Surgeon: Markus Grossman MD;  Location: [facility]     ESOPHAGOSCOPY, GASTROSCOPY, DUODENOSCOPY (EGD), COMBINED N/A 5/6/2016    Procedure: COMBINED ESOPHAGOSCOPY, GASTROSCOPY, DUODENOSCOPY (EGD);  Surgeon: Ngoc, " Markus Calvin MD;  Location: [facility]     LASER SURGERY OF EYE  8/21/2008    Both eyes     SURGICAL HISTORY OF -       T&A     SURGICAL HISTORY OF -       Csection     SURGICAL HISTORY OF -       breast augmentation       Family History:    Family History   Problem Relation Age of Onset     Alcohol/Drug Mother      Cancer Mother         skin, COD: lung, liver, bone cancer     Melanoma Mother      Diabetes Mother         Post transplant onset     Alcohol/Drug Father      Hypertension Father      Cancer Maternal Grandmother         lung/liver     Asthma Maternal Grandmother      Cancer Maternal Grandfather         skin     Cancer - colorectal Maternal Grandfather      Melanoma Maternal Grandfather      C.A.D. No family hx of      Cerebrovascular Disease No family hx of      Breast Cancer No family hx of      Prostate Cancer No family hx of        Social History:  Marital Status:   [2]  Social History     Tobacco Use     Smoking status: Never Smoker     Smokeless tobacco: Never Used   Substance Use Topics     Alcohol use: Yes     Comment: 1-2 drinks per week     Drug use: No        Medications:    ferrous sulfate (IRON) 325 (65 FE) MG tablet  fexofenadine (ALLEGRA) 180 MG tablet  fluocinonide (LIDEX) 0.05 % external solution  ibuprofen (ADVIL) 200 MG capsule  Oxymetazoline HCl (NASAL SPRAY NA)  SUMAtriptan (IMITREX) 25 MG tablet  UNKNOWN TO PATIENT          Review of Systems   Constitutional: Negative.    Musculoskeletal:        Right foot pain   Skin: Negative.    Neurological: Negative.    All other systems reviewed and are negative.      Physical Exam   BP: (!) 142/66  Heart Rate: 89  Temp: 98.5  F (36.9  C)  Resp: 16  Weight: 68 kg (150 lb)  SpO2: 100 %      Physical Exam  Constitutional:       General: She is not in acute distress.     Appearance: Normal appearance. She is not ill-appearing, toxic-appearing or diaphoretic.   HENT:      Head: Normocephalic and atraumatic.   Pulmonary:      Effort: Pulmonary  effort is normal.   Musculoskeletal:      Right ankle: Normal. She exhibits normal range of motion, no swelling and no ecchymosis. No tenderness.      Right foot: Decreased range of motion. Normal capillary refill. Tenderness, bony tenderness and swelling present. No crepitus, deformity or laceration.   Skin:     General: Skin is warm and dry.   Neurological:      Mental Status: She is alert.      Sensory: Sensation is intact.      Motor: Motor function is intact.         ED Course        Procedures      Results for orders placed or performed during the hospital encounter of 05/31/20 (from the past 24 hour(s))   Foot  XR, G/E 3 views, right    Narrative    EXAM: XR ANKLE RT G/E 3 VW, XR FOOT RT G/E 3 VW  LOCATION: [facility]  DATE/TIME: 5/31/2020 3:24 PM    INDICATION: Pain after fall  COMPARISON: None.      Impression    IMPRESSION: There is a fracture of the base of the fifth metatarsal. No additional foot fractures are identified. The ankle is negative for fracture and ankle mortise is intact and symmetric.   Ankle XR, G/E 3 views, right    Narrative    EXAM: XR ANKLE RT G/E 3 VW, XR FOOT RT G/E 3 VW  LOCATION: [facility]  DATE/TIME: 5/31/2020 3:24 PM    INDICATION: Pain after fall  COMPARISON: None.      Impression    IMPRESSION: There is a fracture of the base of the fifth metatarsal. No additional foot fractures are identified. The ankle is negative for fracture and ankle mortise is intact and symmetric.       Medications - No data to display    Assessments & Plan (with Medical Decision Making)     Pt is a 49 year old female who presents with complaints of right foot pain since accidentally stepping on her vacuum hose and twisting her right foot and ankle.  Pt is afebrile on arrival.  Exam as above.  X-rays of right foot show a fifth metatarsal base fracture.  Ankle is negative for fracture.  Pt was instructed to be non-weightbearing with crutches until evaluated by orthopedics.   Return precautions were reviewed.  Hand-outs were provided.    Patient was instructed to follow-up with Ortho in 3-5 days for continued care and management (referral was made).  She is to return to the ED for persistent and/or worsening symptoms.  Patient expressed understanding of the diagnosis and plan and was discharged home in good condition.    I have reviewed the nursing notes.    I have reviewed the findings, diagnosis, plan and need for follow up with the patient.    New Prescriptions    No medications on file       Final diagnoses:   Nondisplaced fracture of fifth metatarsal bone, right foot, initial encounter for closed fracture       5/31/2020   [facility] EMERGENCY DEPARTMENT      Disclaimer:  This note consists of symbols derived from keyboarding, dictation and/or voice recognition software.  As a result, there may be errors in the script that have gone undetected.  Please consider this when interpreting information found in this chart.     Raquel Rodríguez PA-C  06/01/20 0050

## 2020-06-01 ASSESSMENT — ENCOUNTER SYMPTOMS
CONSTITUTIONAL NEGATIVE: 1
NEUROLOGICAL NEGATIVE: 1

## 2020-06-04 ENCOUNTER — OFFICE VISIT (OUTPATIENT)
Dept: PODIATRY | Facility: CLINIC | Age: 50
End: 2020-06-04
Payer: COMMERCIAL

## 2020-06-04 VITALS
HEIGHT: 67 IN | SYSTOLIC BLOOD PRESSURE: 138 MMHG | DIASTOLIC BLOOD PRESSURE: 88 MMHG | TEMPERATURE: 99.3 F | HEART RATE: 65 BPM | WEIGHT: 150 LBS | BODY MASS INDEX: 23.54 KG/M2

## 2020-06-04 DIAGNOSIS — S92.354A NONDISPLACED FRACTURE OF FIFTH METATARSAL BONE, RIGHT FOOT, INITIAL ENCOUNTER FOR CLOSED FRACTURE: ICD-10-CM

## 2020-06-04 PROCEDURE — 99203 OFFICE O/P NEW LOW 30 MIN: CPT | Mod: 57 | Performed by: PODIATRIST

## 2020-06-04 PROCEDURE — 28470 CLTX METATARSAL FX WO MNP EA: CPT | Mod: 55 | Performed by: PODIATRIST

## 2020-06-04 ASSESSMENT — MIFFLIN-ST. JEOR: SCORE: 1343.03

## 2020-06-04 ASSESSMENT — PAIN SCALES - GENERAL: PAINLEVEL: MODERATE PAIN (4)

## 2020-06-04 NOTE — LETTER
6/4/2020         RE: Kalina Schreiber  [id number] [facility] [id number]        Dear Colleague,    Thank you for referring your patient, Kalina Schreiber, to the [facility]. Please see a copy of my visit note below.    PATIENT HISTORY:  Kalina Schreiber is a 49 year old female who presents to clinic with a chief complaint of a painful right foot.  The patient relates the pain is located on the outer aspect on the right foot.  The patient relates injuring the foot on May 31 while walking.  The patient was seen by the ER with x-rays revealing a closed fracture at the base of the fifth metatarsal on the right foot.  The patient was splinted and instructed on non weight bearing with crutches.    REVIEW OF SYSTEMS:  Constitutional, HEENT, cardiovascular, pulmonary, GI, , musculoskeletal, neuro, skin, endocrine and psych systems are negative, except as otherwise noted.     PAST MEDICAL HISTORY:   Past Medical History:   Diagnosis Date     Allergic rhinitis due to other allergen      Basal cell carcinoma      Solitary cyst of breast      Twin pregnancy, delivered         PAST SURGICAL HISTORY:   Past Surgical History:   Procedure Laterality Date     COLONOSCOPY  2/23/2012    Procedure:COLONOSCOPY; Colonoscopy  ; Surgeon:BOB TY; Location:[facility]     COLONOSCOPY N/A 5/6/2016    Procedure: COLONOSCOPY;  Surgeon: Markus Grossman MD;  Location: [facility]     ESOPHAGOSCOPY, GASTROSCOPY, DUODENOSCOPY (EGD), COMBINED N/A 5/6/2016    Procedure: COMBINED ESOPHAGOSCOPY, GASTROSCOPY, DUODENOSCOPY (EGD);  Surgeon: Markus Grossman MD;  Location: [facility]     LASER SURGERY OF EYE  8/21/2008    Both eyes     SURGICAL HISTORY OF -       T&A     SURGICAL HISTORY OF -       Csection     SURGICAL HISTORY OF -       breast augmentation        MEDICATIONS:   Current Outpatient Medications:      ferrous sulfate (IRON) 325 (65 FE) MG tablet, Take 1 tablet (325 mg) by mouth 2 times  daily, Disp: 180 tablet, Rfl: 3     fexofenadine (ALLEGRA) 180 MG tablet, Take 1 tablet (180 mg) by mouth daily, Disp: 30 tablet, Rfl: 0     fluocinonide (LIDEX) 0.05 % external solution, Apply twice daily as needed to frontal scalp., Disp: 60 mL, Rfl: 2     ibuprofen (ADVIL) 200 MG capsule, Take 200 mg by mouth every 4 hours as needed., Disp: , Rfl:      Oxymetazoline HCl (NASAL SPRAY NA), [place] 1 spray in each nostril daily when needed, Disp: , Rfl:      SUMAtriptan (IMITREX) 25 MG tablet, Take 1-2 tablets (25-50 mg) by mouth at onset of headache for migraine May repeat dose in 2 hours.  Do not exceed 200 mg in 24 hours, Disp: 18 tablet, Rfl: 1     UNKNOWN TO PATIENT, Allergy Eye Drops, 1 drop in each eye daily PRN, Disp: , Rfl:      ALLERGIES:    Allergies   Allergen Reactions     Nkda [No Known Drug Allergies]         SOCIAL HISTORY:   Social History     Socioeconomic History     Marital status:      Spouse name: Not on file     Number of children: Not on file     Years of education: Not on file     Highest education level: Not on file   Occupational History     Employer: SELF   Social Needs     Financial resource strain: Not on file     Food insecurity     Worry: Not on file     Inability: Not on file     Transportation needs     Medical: Not on file     Non-medical: Not on file   Tobacco Use     Smoking status: Never Smoker     Smokeless tobacco: Never Used   Substance and Sexual Activity     Alcohol use: Yes     Comment: 1-2 drinks per week     Drug use: No     Sexual activity: Yes     Partners: Male     Birth control/protection: Surgical     Comment: vas   Lifestyle     Physical activity     Days per week: Not on file     Minutes per session: Not on file     Stress: Not on file   Relationships     Social connections     Talks on phone: Not on file     Gets together: Not on file     Attends Jewish service: Not on file     Active member of club or organization: Not on file     Attends meetings of  "clubs or organizations: Not on file     Relationship status: Not on file     Intimate partner violence     Fear of current or ex partner: Not on file     Emotionally abused: Not on file     Physically abused: Not on file     Forced sexual activity: Not on file   Other Topics Concern     Parent/sibling w/ CABG, MI or angioplasty before 65F 55M? 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      Surrogate for twins        FAMILY HISTORY:   Family History   Problem Relation Age of Onset     Alcohol/Drug Mother      Cancer Mother         skin, COD: lung, liver, bone cancer     Melanoma Mother      Diabetes Mother         Post transplant onset     Alcohol/Drug Father      Hypertension Father      Cancer Maternal Grandmother         lung/liver     Asthma Maternal Grandmother      Cancer Maternal Grandfather         skin     Cancer - colorectal Maternal Grandfather      Melanoma Maternal Grandfather      C.A.D. No family hx of      Cerebrovascular Disease No family hx of      Breast Cancer No family hx of      Prostate Cancer No family hx of         EXAM:Vitals: /88   Pulse 65   Temp 99.3  F (37.4  C) (Tympanic)   Ht 1.702 m (5' 7\")   Wt 68 kg (150 lb)   BMI 23.49 kg/m    BMI= Body mass index is 23.49 kg/m .      General appearance: Patient is alert and fully cooperative with history & exam.  No sign of distress is noted during the visit.     Psychiatric: Affect is pleasant & appropriate.  Patient appears motivated to improve health.     Respiratory: Breathing is regular & unlabored while sitting.     HEENT: Hearing is intact to spoken word.  Speech is clear.  No gross evidence of visual impairment that would impact ambulation.     Dermatologic: Skin is intact to both lower extremities without significant lesions, rash or abrasion.  No paronychia or evidence of soft tissue infection is noted.     Vascular: DP & PT pulses are intact & regular bilaterally.  No significant " edema or varicosities noted.  CFT and skin temperature is normal to both lower extremities.     Neurologic: Lower extremity sensation is intact to light touch.  No evidence of weakness or contracture in the lower extremities.  No evidence of neuropathy.     Musculoskeletal: Patient is non-ambulatory with crutches.  No gross ankle deformity noted.  No foot or ankle joint effusion is noted.    One notes positive edema, positive ecchymosis.  One notes pain with palpation over the dorsal aspect overlying the base of the fifth metatarsal on the right.    Radiograph review of previous films including non weightbearing AP, lateral and medial oblique views of the right foot reveals an apparent nondisplaced fifth metatarsal base fracture.  All joint margins appear stable.  There is no apparent tumor formation noted.  There is no evidence of foreign body.    Assessment:  1.  Closed nondisplaced fifth metatarsal base fracture of the right foot.    Plan:  I have explained to Kalina  about the conditions.  We discussed both conservative and surgical treatment options with all associated risks and benefits.  At this time, I do not believe there is need for surgery.  The patient will return to the office in one month for reevaluation and repeat x-rays.      Disclaimer: This note consists of symbols derived from keyboarding, dictation and/or voice recognition software. As a result, there may be errors in the script that have gone undetected. Please consider this when interpreting information found in this chart.       LEIA Alvarez.P.ALEXIA., F.A.C.F.A.S.      Again, thank you for allowing me to participate in the care of your patient.        Sincerely,        Ten James DPM

## 2020-06-04 NOTE — PATIENT INSTRUCTIONS
TOE & METATARSAL FRACTURES  The structure of the foot is complex, consisting of bones, muscles, tendons, and other soft tissues. Of the 26 bones in the foot, 19 are toe bones (phalanges) and metatarsal bones (the long bones in the midfoot). Fractures of the toe and metatarsal bones are common and require evaluation by a specialist. A foot and ankle surgeon should be seen for proper diagnosis and treatment, even if initial treatment has been received in an emergency room.  A fracture is a break in the bone. Fractures can be divided into two categories: traumatic fractures and stress fractures.  TRAUMATIC FRACTURES (also called acute fractures) are caused by a direct blow or impact, such as seriously stubbing your toe. Traumatic fractures can be displaced or non-displaced. If the fracture is displaced, the bone is broken in such a way that it has changed in position (dislocated).  Signs and symptoms of a traumatic fracture include:  You may hear a sound at the time of the break.    Pinpoint pain  (pain at the place of impact) at the time the fracture occurs and perhaps for a few hours later, but often the pain goes away after several hours.   Crooked or abnormal appearance of the toe.   Bruising and swelling the next day.   It is not true that  if you can walk on it, it s not broken.  Evaluation by a foot and ankle surgeon is always recommended.   STRESS FRACTURES are tiny, hairline breaks that are usually caused by repetitive stress. Stress fractures often afflict athletes who, for example, too rapidly increase their running mileage. They can also be caused by an abnormal foot structure, deformities, or osteoporosis. Improper footwear may also lead to stress fractures. Stress fractures should not be ignored. They require proper medical attention to heal correctly.  Symptoms of stress fractures include:  Pain with or after normal activity   Pain that goes away when resting and then returns when standing or during  activity    Pinpoint pain  (pain at the site of the fracture) when touched   Swelling, but no bruising   IMPROPER TREATMENT  Some people say that  the doctor can t do anything for a broken bone in the foot.  This is usually not true. In fact, if a fractured toe or metatarsal bone is not treated correctly, serious complications may develop. For example:  A deformity in the bony architecture which may limit the ability to move the foot or cause difficulty in fitting shoes   Arthritis, which may be caused by a fracture in a joint (the juncture where two bones meet), or may be a result of angular deformities that develop when a displaced fracture is severe or hasn t been properly corrected   Chronic pain and deformity   Non-union, or failure to heal, can lead to subsequent surgery or chronic pain.   PROPER TREATMENT FOR TOES  Fractures of the toe bones are almost always traumatic fractures. Treatment for traumatic fractures depends on the break itself and may include these options:  Rest. Sometimes rest is all that is needed to treat a traumatic fracture of the toe.   Splinting. The toe may be fitted with a splint to keep it in a fixed position.   Rigid or stiff-soled shoe. Wearing a stiff-soled shoe protects the toe and helps keep it properly positioned.    Ameya taping  the fractured toe to another toe is sometimes appropriate, but in other cases it may be harmful.   Surgery. If the break is badly displaced or if the joint is affected, surgery may be necessary. Surgery often involves the use of fixation devices, such as pins.   PROPER TREATMENT OF METATARSALS  Breaks in the metatarsal bones may be either stress or traumatic fractures. Certain kinds of fractures of the metatarsal bones present unique challenges.  For example, sometimes a fracture of the first metatarsal bone (behind the big toe) can lead to arthritis. Since the big toe is used so frequently and bears more weight than other toes, arthritis in that area  can make it painful to walk, bend, or even stand.  Another type of break, called a Medrano fracture, occurs at the base of the fifth metatarsal bone (behind the little toe). It is often misdiagnosed as an ankle sprain, and misdiagnosis can have serious consequences since sprains and fractures require different treatments. Your foot and ankle surgeon is an expert in correctly identifying these conditions as well as other problems of the foot.  Treatment of metatarsal fractures depends on the type and extent of the fracture, and may include:  Rest. Sometimes rest is the only treatment needed to promote healing of a stress or traumatic fracture of a metatarsal bone.   Avoid the offending activity. Because stress fractures result from repetitive stress, it is important to avoid the activity that led to the fracture. Crutches or a wheelchair are sometimes required to offload weight from the foot to give it time to heal.   Immobilization, casting, or rigid shoe. A stiff-soled shoe or other form of immobilization may be used to protect the fractured bone while it is healing.   Surgery. Some traumatic fractures of the metatarsal bones require surgery, especially if the break is badly displaced.   Follow-up care. Your foot and ankle surgeon will provide instructions for care following surgical or non-surgical treatment. Physical therapy, exercises and rehabilitation may be included in a schedule for return to normal activities.

## 2020-06-04 NOTE — PROGRESS NOTES
PATIENT HISTORY:  Kalina Schreiber is a 49 year old female who presents to clinic with a chief complaint of a painful right foot.  The patient relates the pain is located on the outer aspect on the right foot.  The patient relates injuring the foot on May 31 while walking.  The patient was seen by the ER with x-rays revealing a closed fracture at the base of the fifth metatarsal on the right foot.  The patient was splinted and instructed on non weight bearing with crutches.    REVIEW OF SYSTEMS:  Constitutional, HEENT, cardiovascular, pulmonary, GI, , musculoskeletal, neuro, skin, endocrine and psych systems are negative, except as otherwise noted.     PAST MEDICAL HISTORY:   Past Medical History:   Diagnosis Date     Allergic rhinitis due to other allergen      Basal cell carcinoma      Solitary cyst of breast      Twin pregnancy, delivered         PAST SURGICAL HISTORY:   Past Surgical History:   Procedure Laterality Date     COLONOSCOPY  2/23/2012    Procedure:COLONOSCOPY; Colonoscopy  ; Surgeon:BOB TY; Location:[facility]     COLONOSCOPY N/A 5/6/2016    Procedure: COLONOSCOPY;  Surgeon: Markus Grossman MD;  Location: [facility]     ESOPHAGOSCOPY, GASTROSCOPY, DUODENOSCOPY (EGD), COMBINED N/A 5/6/2016    Procedure: COMBINED ESOPHAGOSCOPY, GASTROSCOPY, DUODENOSCOPY (EGD);  Surgeon: Markus Grossman MD;  Location: [facility]     LASER SURGERY OF EYE  8/21/2008    Both eyes     SURGICAL HISTORY OF -       T&A     SURGICAL HISTORY OF -       Csection     SURGICAL HISTORY OF -       breast augmentation        MEDICATIONS:   Current Outpatient Medications:      ferrous sulfate (IRON) 325 (65 FE) MG tablet, Take 1 tablet (325 mg) by mouth 2 times daily, Disp: 180 tablet, Rfl: 3     fexofenadine (ALLEGRA) 180 MG tablet, Take 1 tablet (180 mg) by mouth daily, Disp: 30 tablet, Rfl: 0     fluocinonide (LIDEX) 0.05 % external solution, Apply twice daily as needed to frontal scalp., Disp: 60 mL, Rfl: 2     ibuprofen  (ADVIL) 200 MG capsule, Take 200 mg by mouth every 4 hours as needed., Disp: , Rfl:      Oxymetazoline HCl (NASAL SPRAY NA), Nasoquort 1 spray in each nostril daily when needed, Disp: , Rfl:      SUMAtriptan (IMITREX) 25 MG tablet, Take 1-2 tablets (25-50 mg) by mouth at onset of headache for migraine May repeat dose in 2 hours.  Do not exceed 200 mg in 24 hours, Disp: 18 tablet, Rfl: 1     UNKNOWN TO PATIENT, Allergy Eye Drops, 1 drop in each eye daily PRN, Disp: , Rfl:      ALLERGIES:    Allergies   Allergen Reactions     Nkda [No Known Drug Allergies]         SOCIAL HISTORY:   Social History     Socioeconomic History     Marital status:      Spouse name: Not on file     Number of children: Not on file     Years of education: Not on file     Highest education level: Not on file   Occupational History     Employer: SELF   Social Needs     Financial resource strain: Not on file     Food insecurity     Worry: Not on file     Inability: Not on file     Transportation needs     Medical: Not on file     Non-medical: Not on file   Tobacco Use     Smoking status: Never Smoker     Smokeless tobacco: Never Used   Substance and Sexual Activity     Alcohol use: Yes     Comment: 1-2 drinks per week     Drug use: No     Sexual activity: Yes     Partners: Male     Birth control/protection: Surgical     Comment: vas   Lifestyle     Physical activity     Days per week: Not on file     Minutes per session: Not on file     Stress: Not on file   Relationships     Social connections     Talks on phone: Not on file     Gets together: Not on file     Attends Jainism service: Not on file     Active member of club or organization: Not on file     Attends meetings of clubs or organizations: Not on file     Relationship status: Not on file     Intimate partner violence     Fear of current or ex partner: Not on file     Emotionally abused: Not on file     Physically abused: Not on file     Forced sexual activity: Not on file   Other  "Topics Concern     Parent/sibling w/ CABG, MI or angioplasty before 65F 55M? 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      Surrogate for twins        FAMILY HISTORY:   Family History   Problem Relation Age of Onset     Alcohol/Drug Mother      Cancer Mother         skin, COD: lung, liver, bone cancer     Melanoma Mother      Diabetes Mother         Post transplant onset     Alcohol/Drug Father      Hypertension Father      Cancer Maternal Grandmother         lung/liver     Asthma Maternal Grandmother      Cancer Maternal Grandfather         skin     Cancer - colorectal Maternal Grandfather      Melanoma Maternal Grandfather      C.A.D. No family hx of      Cerebrovascular Disease No family hx of      Breast Cancer No family hx of      Prostate Cancer No family hx of         EXAM:Vitals: /88   Pulse 65   Temp 99.3  F (37.4  C) (Tympanic)   Ht 1.702 m (5' 7\")   Wt 68 kg (150 lb)   BMI 23.49 kg/m    BMI= Body mass index is 23.49 kg/m .      General appearance: Patient is alert and fully cooperative with history & exam.  No sign of distress is noted during the visit.     Psychiatric: Affect is pleasant & appropriate.  Patient appears motivated to improve health.     Respiratory: Breathing is regular & unlabored while sitting.     HEENT: Hearing is intact to spoken word.  Speech is clear.  No gross evidence of visual impairment that would impact ambulation.     Dermatologic: Skin is intact to both lower extremities without significant lesions, rash or abrasion.  No paronychia or evidence of soft tissue infection is noted.     Vascular: DP & PT pulses are intact & regular bilaterally.  No significant edema or varicosities noted.  CFT and skin temperature is normal to both lower extremities.     Neurologic: Lower extremity sensation is intact to light touch.  No evidence of weakness or contracture in the lower extremities.  No evidence of neuropathy.   "   Musculoskeletal: Patient is non-ambulatory with crutches.  No gross ankle deformity noted.  No foot or ankle joint effusion is noted.    One notes positive edema, positive ecchymosis.  One notes pain with palpation over the dorsal aspect overlying the base of the fifth metatarsal on the right.    Radiograph review of previous films including non weightbearing AP, lateral and medial oblique views of the right foot reveals an apparent nondisplaced fifth metatarsal base fracture.  All joint margins appear stable.  There is no apparent tumor formation noted.  There is no evidence of foreign body.    Assessment:  1.  Closed nondisplaced fifth metatarsal base fracture of the right foot.    Plan:  I have explained to Kalina  about the conditions.  We discussed both conservative and surgical treatment options with all associated risks and benefits.  At this time, I do not believe there is need for surgery.  The patient will return to the office in one month for reevaluation and repeat x-rays.      Disclaimer: This note consists of symbols derived from keyboarding, dictation and/or voice recognition software. As a result, there may be errors in the script that have gone undetected. Please consider this when interpreting information found in this chart.       BRINA James D.P.M., [email or web address].[email or web address].

## 2020-06-04 NOTE — NURSING NOTE
"Chief Complaint   Patient presents with     Fracture     Nondisplaced fracture of fifth metatarsal bone, right foot, XR 5/31/20, \"rolled ankle on steps\"       Initial /88   Pulse 65   Temp 99.3  F (37.4  C) (Tympanic)   Ht 1.702 m (5' 7\")   Wt 68 kg (150 lb)   BMI 23.49 kg/m   Estimated body mass index is 23.49 kg/m  as calculated from the following:    Height as of this encounter: 1.702 m (5' 7\").    Weight as of this encounter: 68 kg (150 lb).  Medications and allergies reviewed.      Nat DUPREE MA    "

## 2020-07-15 ENCOUNTER — OFFICE VISIT (OUTPATIENT)
Dept: DERMATOLOGY | Facility: CLINIC | Age: 50
End: 2020-07-15
Payer: COMMERCIAL

## 2020-07-15 VITALS
RESPIRATION RATE: 16 BRPM | DIASTOLIC BLOOD PRESSURE: 77 MMHG | SYSTOLIC BLOOD PRESSURE: 125 MMHG | OXYGEN SATURATION: 100 % | HEART RATE: 59 BPM

## 2020-07-15 DIAGNOSIS — D22.9 MULTIPLE BENIGN NEVI: ICD-10-CM

## 2020-07-15 DIAGNOSIS — Z85.828 HISTORY OF BASAL CELL CARCINOMA: ICD-10-CM

## 2020-07-15 DIAGNOSIS — L81.4 LENTIGO: ICD-10-CM

## 2020-07-15 DIAGNOSIS — L82.1 SEBORRHEIC KERATOSIS: ICD-10-CM

## 2020-07-15 DIAGNOSIS — L66.12 FRONTAL FIBROSING ALOPECIA: Primary | ICD-10-CM

## 2020-07-15 DIAGNOSIS — D18.01 CHERRY ANGIOMA: ICD-10-CM

## 2020-07-15 PROCEDURE — 11900 INJECT SKIN LESIONS </W 7: CPT | Performed by: PHYSICIAN ASSISTANT

## 2020-07-15 PROCEDURE — 99213 OFFICE O/P EST LOW 20 MIN: CPT | Mod: 24 | Performed by: PHYSICIAN ASSISTANT

## 2020-07-15 NOTE — NURSING NOTE
"Initial /77 (BP Location: Left arm, Patient Position: Sitting, Cuff Size: Adult Regular)   Pulse 59   Resp 16   SpO2 100%  Estimated body mass index is 23.49 kg/m  as calculated from the following:    Height as of 6/4/20: 1.702 m (5' 7\").    Weight as of 6/4/20: 68 kg (150 lb). .    Jessica Burgess [facility]    "

## 2020-07-15 NOTE — PROGRESS NOTES
Kalina Schreiber is a 49 year old year old female patient here today for recheck frontal fibrosing alopecia. She is using lidex solution, not unsure if it is helping. She denies any new or changing spots.  Patient has no other skin complaints today.  Remainder of the HPI, Meds, PMH, Allergies, FH, and SH was reviewed in chart.    Pertinent Hx:   History of BCC   Past Medical History:   Diagnosis Date     Allergic rhinitis due to other allergen      Basal cell carcinoma      Solitary cyst of breast      Twin pregnancy, delivered        Past Surgical History:   Procedure Laterality Date     COLONOSCOPY  2/23/2012    Procedure:COLONOSCOPY; Colonoscopy  ; Surgeon:BOB TY; Location:[facility]     COLONOSCOPY N/A 5/6/2016    Procedure: COLONOSCOPY;  Surgeon: Markus Grossman MD;  Location: [facility]     ESOPHAGOSCOPY, GASTROSCOPY, DUODENOSCOPY (EGD), COMBINED N/A 5/6/2016    Procedure: COMBINED ESOPHAGOSCOPY, GASTROSCOPY, DUODENOSCOPY (EGD);  Surgeon: Markus Grossman MD;  Location: [facility]     LASER SURGERY OF EYE  8/21/2008    Both eyes     SURGICAL HISTORY OF -       T&A     SURGICAL HISTORY OF -       Csection     SURGICAL HISTORY OF -       breast augmentation        Family History   Problem Relation Age of Onset     Alcohol/Drug Mother      Cancer Mother         skin, COD: lung, liver, bone cancer     Melanoma Mother      Diabetes Mother         Post transplant onset     Alcohol/Drug Father      Hypertension Father      Cancer Maternal Grandmother         lung/liver     Asthma Maternal Grandmother      Cancer Maternal Grandfather         skin     Cancer - colorectal Maternal Grandfather      Melanoma Maternal Grandfather      C.A.D. No family hx of      Cerebrovascular Disease No family hx of      Breast Cancer No family hx of      Prostate Cancer No family hx of        Social History     Socioeconomic History     Marital status:      Spouse name: Not on file     Number of children: Not on file      Years of education: Not on file     Highest education level: Not on file   Occupational History     Employer: SELF   Social Needs     Financial resource strain: Not on file     Food insecurity     Worry: Not on file     Inability: Not on file     Transportation needs     Medical: Not on file     Non-medical: Not on file   Tobacco Use     Smoking status: Never Smoker     Smokeless tobacco: Never Used   Substance and Sexual Activity     Alcohol use: Yes     Comment: 1-2 drinks per week     Drug use: No     Sexual activity: Yes     Partners: Male     Birth control/protection: Surgical     Comment: vas   Lifestyle     Physical activity     Days per week: Not on file     Minutes per session: Not on file     Stress: Not on file   Relationships     Social connections     Talks on phone: Not on file     Gets together: Not on file     Attends Yazidism service: Not on file     Active member of club or organization: Not on file     Attends meetings of clubs or organizations: Not on file     Relationship status: Not on file     Intimate partner violence     Fear of current or ex partner: Not on file     Emotionally abused: Not on file     Physically abused: Not on file     Forced sexual activity: Not on file   Other Topics Concern     Parent/sibling w/ CABG, MI or angioplasty before 65F 55M? 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      Surrogate for twins       Outpatient Encounter Medications as of 7/15/2020   Medication Sig Dispense Refill     ferrous sulfate (IRON) 325 (65 FE) MG tablet Take 1 tablet (325 mg) by mouth 2 times daily 180 tablet 3     fexofenadine (ALLEGRA) 180 MG tablet Take 1 tablet (180 mg) by mouth daily 30 tablet 0     fluocinonide (LIDEX) 0.05 % external solution Apply twice daily as needed to frontal scalp. 60 mL 2     ibuprofen (ADVIL) 200 MG capsule Take 200 mg by mouth every 4 hours as needed.       Oxymetazoline HCl (NASAL SPRAY NA) Nasoquort 1 spray in  each nostril daily when needed       SUMAtriptan (IMITREX) 25 MG tablet Take 1-2 tablets (25-50 mg) by mouth at onset of headache for migraine May repeat dose in 2 hours.  Do not exceed 200 mg in 24 hours 18 tablet 1     UNKNOWN TO PATIENT Allergy Eye Drops, 1 drop in each eye daily PRN       No facility-administered encounter medications on file as of 7/15/2020.              Review Of Systems  Skin: As above  Eyes: negative  Ears/Nose/Throat: negative  Respiratory: No shortness of breath, dyspnea on exertion, cough, or hemoptysis  Cardiovascular: negative  Gastrointestinal: negative  Genitourinary: negative  Musculoskeletal: negative  Neurologic: negative  Psychiatric: negative  Hematologic/Lymphatic/Immunologic: negative  Endocrine: negative      O:   NAD, WDWN, Alert & Oriented, Mood & Affect wnl, Vitals stable   Here today alone   /77 (BP Location: Left arm, Patient Position: Sitting, Cuff Size: Adult Regular)   Pulse 59   Resp 16   SpO2 100%    General appearance normal   Vitals stable   Alert, oriented and in no acute distress     Mild erythematous scaly perifollicular papules on frontal scalp   Stuck on papules and brown macules on trunk and ext   Red papules on trunk  Brown papules and macules with regular pigment network and borders on torso and extremities      The remainder of skin exam is normal       Eyes: Conjunctivae/lids:Normal     ENT: Lips: normal    MSK:Normal    Cardiovascular: peripheral edema none    Pulm: Breathing Normal    Neuro/Psych: Orientation:Alert and Orientedx3 ; Mood/Affect:normal     A/P:  1. Frontal fibrosing Alopecia  IL TAC: PGACAC discussed.  Risks including but not limited to injection site reaction, bruising, no resolution.  All questions answered and entertained to patient s satisfaction.  Informed consent obtained.  IL TAC in concentration of 10 mg/ml was injected ID to frontal fibrosing alopecia.  Total injected was  0.3 ml.  Patient tolerated without complications  and given wound care instructions, including not to move product around.  Return in 4 weeks for follow-up and possible additional IL TAC.    2. Seborrheic keratosis, lentigo, angioma, benign nevi, History of BCC  BENIGN LESIONS DISCUSSED WITH PATIENT:  I discussed the specifics of tumor, prognosis, and genetics of benign lesions.  I explained that treatment of these lesions would be purely cosmetic and not medically neccessary.  I discussed with patient different removal options including excision, cautery and /or laser.      Nature and genetics of benign skin lesions dicussed with patient.  Signs and Symptoms of skin cancer discussed with patient.  ABCDEs of melanoma reviewed with patient.  Patient encouraged to perform monthly skin exams.  UV precautions reviewed with patient.  Risks of non-melanoma skin cancer discussed with patient   Return to clinic in one year or sooner if needed.

## 2020-07-15 NOTE — LETTER
7/15/2020         RE: Kalina Schreiber  [address] [facility] [id number]        Dear Colleague,    Thank you for referring your patient, Kalina Schreiber, to the [facility]. Please see a copy of my visit note below.    Kalina Schreiber is a 49 year old year old female patient here today for recheck frontal fibrosing alopecia. She is using lidex solution, not unsure if it is helping. She denies any new or changing spots.  Patient has no other skin complaints today.  Remainder of the HPI, Meds, PMH, Allergies, FH, and SH was reviewed in chart.    Pertinent Hx:   History of BCC   Past Medical History:   Diagnosis Date     Allergic rhinitis due to other allergen      Basal cell carcinoma      Solitary cyst of breast      Twin pregnancy, delivered        Past Surgical History:   Procedure Laterality Date     COLONOSCOPY  2/23/2012    Procedure:COLONOSCOPY; Colonoscopy  ; Surgeon:BOB TY; Location:[facility]     COLONOSCOPY N/A 5/6/2016    Procedure: COLONOSCOPY;  Surgeon: Markus Grossman MD;  Location: [facility]     ESOPHAGOSCOPY, GASTROSCOPY, DUODENOSCOPY (EGD), COMBINED N/A 5/6/2016    Procedure: COMBINED ESOPHAGOSCOPY, GASTROSCOPY, DUODENOSCOPY (EGD);  Surgeon: Markus Grossman MD;  Location: [facility]     LASER SURGERY OF EYE  8/21/2008    Both eyes     SURGICAL HISTORY OF -       T&A     SURGICAL HISTORY OF -       Csection     SURGICAL HISTORY OF -       breast augmentation        Family History   Problem Relation Age of Onset     Alcohol/Drug Mother      Cancer Mother         skin, COD: lung, liver, bone cancer     Melanoma Mother      Diabetes Mother         Post transplant onset     Alcohol/Drug Father      Hypertension Father      Cancer Maternal Grandmother         lung/liver     Asthma Maternal Grandmother      Cancer Maternal Grandfather         skin     Cancer - colorectal Maternal Grandfather      Melanoma Maternal Grandfather      C.A.D. No family hx of       Cerebrovascular Disease No family hx of      Breast Cancer No family hx of      Prostate Cancer No family hx of        Social History     Socioeconomic History     Marital status:      Spouse name: Not on file     Number of children: Not on file     Years of education: Not on file     Highest education level: Not on file   Occupational History     Employer: SELF   Social Needs     Financial resource strain: Not on file     Food insecurity     Worry: Not on file     Inability: Not on file     Transportation needs     Medical: Not on file     Non-medical: Not on file   Tobacco Use     Smoking status: Never Smoker     Smokeless tobacco: Never Used   Substance and Sexual Activity     Alcohol use: Yes     Comment: 1-2 drinks per week     Drug use: No     Sexual activity: Yes     Partners: Male     Birth control/protection: Surgical     Comment: vas   Lifestyle     Physical activity     Days per week: Not on file     Minutes per session: Not on file     Stress: Not on file   Relationships     Social connections     Talks on phone: Not on file     Gets together: Not on file     Attends Yazdanism service: Not on file     Active member of club or organization: Not on file     Attends meetings of clubs or organizations: Not on file     Relationship status: Not on file     Intimate partner violence     Fear of current or ex partner: Not on file     Emotionally abused: Not on file     Physically abused: Not on file     Forced sexual activity: Not on file   Other Topics Concern     Parent/sibling w/ CABG, MI or angioplasty before 65F 55M? 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      Surrogate for twins       Outpatient Encounter Medications as of 7/15/2020   Medication Sig Dispense Refill     ferrous sulfate (IRON) 325 (65 FE) MG tablet Take 1 tablet (325 mg) by mouth 2 times daily 180 tablet 3     fexofenadine (ALLEGRA) 180 MG tablet Take 1 tablet (180 mg) by mouth daily 30  tablet 0     fluocinonide (LIDEX) 0.05 % external solution Apply twice daily as needed to frontal scalp. 60 mL 2     ibuprofen (ADVIL) 200 MG capsule Take 200 mg by mouth every 4 hours as needed.       Oxymetazoline HCl (NASAL SPRAY NA) Nasoquort 1 spray in each nostril daily when needed       SUMAtriptan (IMITREX) 25 MG tablet Take 1-2 tablets (25-50 mg) by mouth at onset of headache for migraine May repeat dose in 2 hours.  Do not exceed 200 mg in 24 hours 18 tablet 1     UNKNOWN TO PATIENT Allergy Eye Drops, 1 drop in each eye daily PRN       No facility-administered encounter medications on file as of 7/15/2020.              Review Of Systems  Skin: As above  Eyes: negative  Ears/Nose/Throat: negative  Respiratory: No shortness of breath, dyspnea on exertion, cough, or hemoptysis  Cardiovascular: negative  Gastrointestinal: negative  Genitourinary: negative  Musculoskeletal: negative  Neurologic: negative  Psychiatric: negative  Hematologic/Lymphatic/Immunologic: negative  Endocrine: negative      O:   NAD, WDWN, Alert & Oriented, Mood & Affect wnl, Vitals stable   Here today alone   /77 (BP Location: Left arm, Patient Position: Sitting, Cuff Size: Adult Regular)   Pulse 59   Resp 16   SpO2 100%    General appearance normal   Vitals stable   Alert, oriented and in no acute distress     Mild erythematous scaly perifollicular papules on frontal scalp   Stuck on papules and brown macules on trunk and ext   Red papules on trunk  Brown papules and macules with regular pigment network and borders on torso and extremities      The remainder of skin exam is normal       Eyes: Conjunctivae/lids:Normal     ENT: Lips: normal    MSK:Normal    Cardiovascular: peripheral edema none    Pulm: Breathing Normal    Neuro/Psych: Orientation:Alert and Orientedx3 ; Mood/Affect:normal     A/P:  1. Frontal fibrosing Alopecia  IL TAC: PGACAC discussed.  Risks including but not limited to injection site reaction, bruising, no  resolution.  All questions answered and entertained to patient s satisfaction.  Informed consent obtained.  IL TAC in concentration of 10 mg/ml was injected ID to frontal fibrosing alopecia.  Total injected was  0.3 ml.  Patient tolerated without complications and given wound care instructions, including not to move product around.  Return in 4 weeks for follow-up and possible additional IL TAC.    2. Seborrheic keratosis, lentigo, angioma, benign nevi, History of BCC  BENIGN LESIONS DISCUSSED WITH PATIENT:  I discussed the specifics of tumor, prognosis, and genetics of benign lesions.  I explained that treatment of these lesions would be purely cosmetic and not medically neccessary.  I discussed with patient different removal options including excision, cautery and /or laser.      Nature and genetics of benign skin lesions dicussed with patient.  Signs and Symptoms of skin cancer discussed with patient.  ABCDEs of melanoma reviewed with patient.  Patient encouraged to perform monthly skin exams.  UV precautions reviewed with patient.  Risks of non-melanoma skin cancer discussed with patient   Return to clinic in one year or sooner if needed.       Again, thank you for allowing me to participate in the care of your patient.        Sincerely,        Talisha Aguilar PA-C

## 2020-09-30 ENCOUNTER — OFFICE VISIT (OUTPATIENT)
Dept: DERMATOLOGY | Facility: CLINIC | Age: 50
End: 2020-09-30
Payer: COMMERCIAL

## 2020-09-30 VITALS — DIASTOLIC BLOOD PRESSURE: 69 MMHG | HEART RATE: 77 BPM | OXYGEN SATURATION: 100 % | SYSTOLIC BLOOD PRESSURE: 121 MMHG

## 2020-09-30 DIAGNOSIS — L66.12 FRONTAL FIBROSING ALOPECIA: Primary | ICD-10-CM

## 2020-09-30 PROCEDURE — 11900 INJECT SKIN LESIONS </W 7: CPT | Performed by: PHYSICIAN ASSISTANT

## 2020-09-30 NOTE — LETTER
9/30/2020         RE: Kalina Schreiber  [address] [facility] [address]        Dear Colleague,    Thank you for referring your patient, Kalina Schreiber, to the [facility]. Please see a copy of my visit note below.    Kalina Schreiber is a 49 year old year old female patient here today for recheck frontal fibrosing alopecia. She notes that scalp is stable.  Patient has no other skin complaints today.  Remainder of the HPI, Meds, PMH, Allergies, FH, and SH was reviewed in chart.    Past Medical History:   Diagnosis Date     Allergic rhinitis due to other allergen      Basal cell carcinoma      Solitary cyst of breast      Twin pregnancy, delivered        Past Surgical History:   Procedure Laterality Date     COLONOSCOPY  2/23/2012    Procedure:COLONOSCOPY; Colonoscopy  ; Surgeon:BOB TY; Location:[facility]     COLONOSCOPY N/A 5/6/2016    Procedure: COLONOSCOPY;  Surgeon: Markus Grossman MD;  Location: [facility]     ESOPHAGOSCOPY, GASTROSCOPY, DUODENOSCOPY (EGD), COMBINED N/A 5/6/2016    Procedure: COMBINED ESOPHAGOSCOPY, GASTROSCOPY, DUODENOSCOPY (EGD);  Surgeon: Markus Grossman MD;  Location: [facility]     LASER SURGERY OF EYE  8/21/2008    Both eyes     SURGICAL HISTORY OF -       T&A     SURGICAL HISTORY OF -       Csection     SURGICAL HISTORY OF -       breast augmentation        Family History   Problem Relation Age of Onset     Alcohol/Drug Mother      Cancer Mother         skin, COD: lung, liver, bone cancer     Melanoma Mother      Diabetes Mother         Post transplant onset     Alcohol/Drug Father      Hypertension Father      Cancer Maternal Grandmother         lung/liver     Asthma Maternal Grandmother      Cancer Maternal Grandfather         skin     Cancer - colorectal Maternal Grandfather      Melanoma Maternal Grandfather      C.A.D. No family hx of      Cerebrovascular Disease No family hx of      Breast Cancer No family hx of      Prostate Cancer No family hx  of        Social History     Socioeconomic History     Marital status:      Spouse name: Not on file     Number of children: Not on file     Years of education: Not on file     Highest education level: Not on file   Occupational History     Employer: SELF   Social Needs     Financial resource strain: Not on file     Food insecurity     Worry: Not on file     Inability: Not on file     Transportation needs     Medical: Not on file     Non-medical: Not on file   Tobacco Use     Smoking status: Never Smoker     Smokeless tobacco: Never Used   Substance and Sexual Activity     Alcohol use: Yes     Comment: 1-2 drinks per week     Drug use: No     Sexual activity: Yes     Partners: Male     Birth control/protection: Surgical     Comment: vas   Lifestyle     Physical activity     Days per week: Not on file     Minutes per session: Not on file     Stress: Not on file   Relationships     Social connections     Talks on phone: Not on file     Gets together: Not on file     Attends [employer] service: Not on file     Active member of club or organization: Not on file     Attends meetings of clubs or organizations: Not on file     Relationship status: Not on file     Intimate partner violence     Fear of current or ex partner: Not on file     Emotionally abused: Not on file     Physically abused: Not on file     Forced sexual activity: Not on file   Other Topics Concern     Parent/sibling w/ CABG, MI or angioplasty before 65F 55M? 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      Surrogate for twins       Outpatient Encounter Medications as of 9/30/2020   Medication Sig Dispense Refill     ferrous sulfate (IRON) 325 (65 FE) MG tablet Take 1 tablet (325 mg) by mouth 2 times daily 180 tablet 3     fexofenadine (ALLEGRA) 180 MG tablet Take 1 tablet (180 mg) by mouth daily 30 tablet 0     fluocinonide (LIDEX) 0.05 % external solution Apply twice daily as needed to frontal scalp. 60 mL 2      ibuprofen (ADVIL) 200 MG capsule Take 200 mg by mouth every 4 hours as needed.       Oxymetazoline HCl (NASAL SPRAY NA) Nasoquort 1 spray in each nostril daily when needed       SUMAtriptan (IMITREX) 25 MG tablet Take 1-2 tablets (25-50 mg) by mouth at onset of headache for migraine May repeat dose in 2 hours.  Do not exceed 200 mg in 24 hours 18 tablet 1     UNKNOWN TO PATIENT Allergy Eye Drops, 1 drop in each eye daily PRN       No facility-administered encounter medications on file as of 9/30/2020.              Review Of Systems  Skin: As above  Eyes: negative  Ears/Nose/Throat: negative  Respiratory: No shortness of breath, dyspnea on exertion, cough, or hemoptysis  Cardiovascular: negative  Gastrointestinal: negative  Genitourinary: negative  Musculoskeletal: negative  Neurologic: negative  Psychiatric: negative  Hematologic/Lymphatic/Immunologic: negative  Endocrine: negative      O:   NAD, WDWN, Alert & Oriented, Mood & Affect wnl, Vitals stable   Here today alone   /69   Pulse 77   SpO2 100%    General appearance normal   Vitals stable   Alert, oriented and in no acute distress     Mild erythematous scaly perifollicular papules on frontal scalp       Eyes: Conjunctivae/lids:Normal     ENT: Lips: normal    MSK:Normal: none    Pulm: Breathing Normal    Neuro/Psych: Orientation:Alert and Orientedx3 ; Mood/Affect:normal     A/P:  1. Frontal fibrosing Alopecia  IL TAC: PGACAC discussed.  Risks including but not limited to injection site reaction, bruising, no resolution.  All questions answered and entertained to patient s satisfaction.  Informed consent obtained.  IL TAC in concentration of 10 mg/ml was injected ID to frontal fibrosing alopecia.  Total injected was  0.3 ml.  Patient tolerated without complications and given wound care instructions, including not to move product around.  Return in 4 weeks for follow-up and possible additional IL TAC.      Again, thank you for allowing me to  participate in the care of your patient.        Sincerely,        Talisha Aguilar PA-C

## 2020-09-30 NOTE — PROGRESS NOTES
Kalina Schreiber is a 49 year old year old female patient here today for recheck frontal fibrosing alopecia. She notes that scalp is stable.  Patient has no other skin complaints today.  Remainder of the HPI, Meds, PMH, Allergies, FH, and SH was reviewed in chart.    Past Medical History:   Diagnosis Date     Allergic rhinitis due to other allergen      Basal cell carcinoma      Solitary cyst of breast      Twin pregnancy, delivered        Past Surgical History:   Procedure Laterality Date     COLONOSCOPY  2/23/2012    Procedure:COLONOSCOPY; Colonoscopy  ; Surgeon:BOB TY; [facility]     COLONOSCOPY N/A 5/6/2016    Procedure: COLONOSCOPY;  Surgeon: Markus Grossman MD;  Location: [facility]     ESOPHAGOSCOPY, GASTROSCOPY, DUODENOSCOPY (EGD), COMBINED N/A 5/6/2016    Procedure: COMBINED ESOPHAGOSCOPY, GASTROSCOPY, DUODENOSCOPY (EGD);  Surgeon: Markus Grossman MD;  Location: [facility]     LASER SURGERY OF EYE  8/21/2008    Both eyes     SURGICAL HISTORY OF -       T&A     SURGICAL HISTORY OF -       Csection     SURGICAL HISTORY OF -       breast augmentation        Family History   Problem Relation Age of Onset     Alcohol/Drug Mother      Cancer Mother         skin, COD: lung, liver, bone cancer     Melanoma Mother      Diabetes Mother         Post transplant onset     Alcohol/Drug Father      Hypertension Father      Cancer Maternal Grandmother         lung/liver     Asthma Maternal Grandmother      Cancer Maternal Grandfather         skin     Cancer - colorectal Maternal Grandfather      Melanoma Maternal Grandfather      C.A.D. No family hx of      Cerebrovascular Disease No family hx of      Breast Cancer No family hx of      Prostate Cancer No family hx of        Social History     Socioeconomic History     Marital status:      Spouse name: Not on file     Number of children: Not on file     Years of education: Not on file     Highest education level: Not on file   Occupational History      Employer: SELF   Social Needs     Financial resource strain: Not on file     Food insecurity     Worry: Not on file     Inability: Not on file     Transportation needs     Medical: Not on file     Non-medical: Not on file   Tobacco Use     Smoking status: Never Smoker     Smokeless tobacco: Never Used   Substance and Sexual Activity     Alcohol use: Yes     Comment: 1-2 drinks per week     Drug use: No     Sexual activity: Yes     Partners: Male     Birth control/protection: Surgical     Comment: vas   Lifestyle     Physical activity     Days per week: Not on file     Minutes per session: Not on file     Stress: Not on file   Relationships     Social connections     Talks on phone: Not on file     Gets together: Not on file     Attends Rastafari service: Not on file     Active member of club or organization: Not on file     Attends meetings of clubs or organizations: Not on file     Relationship status: Not on file     Intimate partner violence     Fear of current or ex partner: Not on file     Emotionally abused: Not on file     Physically abused: Not on file     Forced sexual activity: Not on file   Other Topics Concern     Parent/sibling w/ CABG, MI or angioplasty before 65F 55M? 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      Surrogate for twins       Outpatient Encounter Medications as of 9/30/2020   Medication Sig Dispense Refill     ferrous sulfate (IRON) 325 (65 FE) MG tablet Take 1 tablet (325 mg) by mouth 2 times daily 180 tablet 3     fexofenadine (ALLEGRA) 180 MG tablet Take 1 tablet (180 mg) by mouth daily 30 tablet 0     fluocinonide (LIDEX) 0.05 % external solution Apply twice daily as needed to frontal scalp. 60 mL 2     ibuprofen (ADVIL) 200 MG capsule Take 200 mg by mouth every 4 hours as needed.       Oxymetazoline HCl (NASAL SPRAY NA) Nasoquort 1 spray in each nostril daily when needed       SUMAtriptan (IMITREX) 25 MG tablet Take 1-2 tablets (25-50 mg)  by mouth at onset of headache for migraine May repeat dose in 2 hours.  Do not exceed 200 mg in 24 hours 18 tablet 1     UNKNOWN TO PATIENT Allergy Eye Drops, 1 drop in each eye daily PRN       No facility-administered encounter medications on file as of 9/30/2020.              Review Of Systems  Skin: As above  Eyes: negative  Ears/Nose/Throat: negative  Respiratory: No shortness of breath, dyspnea on exertion, cough, or hemoptysis  Cardiovascular: negative  Gastrointestinal: negative  Genitourinary: negative  Musculoskeletal: negative  Neurologic: negative  Psychiatric: negative  Hematologic/Lymphatic/Immunologic: negative  Endocrine: negative      O:   NAD, WDWN, Alert & Oriented, Mood & Affect wnl, Vitals stable   Here today alone   /69   Pulse 77   SpO2 100%    General appearance normal   Vitals stable   Alert, oriented and in no acute distress     Mild erythematous scaly perifollicular papules on frontal scalp       Eyes: Conjunctivae/lids:Normal     ENT: Lips: normal    MSK:Normal: none    Pulm: Breathing Normal    Neuro/Psych: Orientation:Alert and Orientedx3 ; Mood/Affect:normal     A/P:  1. Frontal fibrosing Alopecia  IL TAC: PGACAC discussed.  Risks including but not limited to injection site reaction, bruising, no resolution.  All questions answered and entertained to patient s satisfaction.  Informed consent obtained.  IL TAC in concentration of 10 mg/ml was injected ID to frontal fibrosing alopecia.  Total injected was  0.3 ml.  Patient tolerated without complications and given wound care instructions, including not to move product around.  Return in 4 weeks for follow-up and possible additional IL TAC.

## 2020-09-30 NOTE — NURSING NOTE
Chief Complaint   Patient presents with     Hair Loss       Vitals:    09/30/20 1301   BP: 121/69   Pulse: 77   SpO2: 100%     Wt Readings from Last 1 Encounters:   06/04/20 68 kg (150 lb)       Xiao Stoll LPN.................9/30/2020

## 2020-10-19 ENCOUNTER — HOSPITAL ENCOUNTER (OUTPATIENT)
Dept: MAMMOGRAPHY | Facility: CLINIC | Age: 50
Discharge: HOME | End: 2020-10-19
Attending: NURSE PRACTITIONER | Admitting: NURSE PRACTITIONER
Payer: COMMERCIAL

## 2020-10-19 DIAGNOSIS — Z12.31 VISIT FOR SCREENING MAMMOGRAM: ICD-10-CM

## 2020-10-19 PROCEDURE — 77067 SCR MAMMO BI INCL CAD: CPT

## 2020-11-16 ENCOUNTER — HEALTH MAINTENANCE LETTER (OUTPATIENT)
Age: 50
End: 2020-11-16

## 2020-11-19 DIAGNOSIS — L30.9 DERMATITIS: ICD-10-CM

## 2020-11-19 RX ORDER — FLUOCINONIDE TOPICAL SOLUTION USP, 0.05% 0.5 MG/ML
SOLUTION TOPICAL
Qty: 60 ML | Refills: 2 | Status: SHIPPED | OUTPATIENT
Start: 2020-11-19 | End: 2022-01-20

## 2020-11-19 NOTE — TELEPHONE ENCOUNTER
Requested Prescriptions   Pending Prescriptions Disp Refills     fluocinonide (LIDEX) 0.05 % external solution 60 mL 2     Sig: Apply twice daily as needed to frontal scalp.       There is no refill protocol information for this order        Last Written Prescription Date:    Last Fill Quantity: ,  # refills:    Last office visit: 9/30/2020 with prescribing provider:  Talisha [place] Office Visit:   Next 5 appointments (look out 90 days)    Jan 26, 2021  1:00 PM  Return Visit with Talisha Cleveland PA-C  [facility] ([facility]) [address] [place] [id number] [phone]

## 2020-11-19 NOTE — LETTER
[facility]  [address] [facility] [address]  Phone: [phone]       November 19, 2020         Kalina Schreiber  [address] [address]            Dear Kalina:    We are concerned about your health care.  We recently provided you with medication refills.  Many medications require routine follow-up with your doctor.    Your prescription(s) have been refilled so you may have time for the above noted follow-up. Please call to schedule soon so we can assure you have an appointment before your next refills are needed.    Thank you,      Talisha PARKER / tr

## 2020-12-01 ENCOUNTER — MYC MEDICAL ADVICE (OUTPATIENT)
Dept: FAMILY MEDICINE | Facility: CLINIC | Age: 50
End: 2020-12-01

## 2020-12-02 ENCOUNTER — OFFICE VISIT (OUTPATIENT)
Dept: FAMILY MEDICINE | Facility: CLINIC | Age: 50
End: 2020-12-02
Payer: COMMERCIAL

## 2020-12-02 VITALS
DIASTOLIC BLOOD PRESSURE: 80 MMHG | BODY MASS INDEX: 24.01 KG/M2 | TEMPERATURE: 98.4 F | HEART RATE: 77 BPM | HEIGHT: 67 IN | WEIGHT: 153 LBS | OXYGEN SATURATION: 99 % | SYSTOLIC BLOOD PRESSURE: 134 MMHG

## 2020-12-02 DIAGNOSIS — G43.109 MIGRAINE WITH AURA AND WITHOUT STATUS MIGRAINOSUS, NOT INTRACTABLE: Primary | ICD-10-CM

## 2020-12-02 DIAGNOSIS — R82.90 NONSPECIFIC FINDING ON EXAMINATION OF URINE: ICD-10-CM

## 2020-12-02 DIAGNOSIS — N30.01 ACUTE CYSTITIS WITH HEMATURIA: ICD-10-CM

## 2020-12-02 DIAGNOSIS — R39.9 SYMPTOMS INVOLVING URINARY SYSTEM: ICD-10-CM

## 2020-12-02 LAB
ALBUMIN UR-MCNC: NEGATIVE MG/DL
APPEARANCE UR: CLEAR
BACTERIA #/AREA URNS HPF: ABNORMAL /HPF
BILIRUB UR QL STRIP: NEGATIVE
COLOR UR AUTO: YELLOW
GLUCOSE UR STRIP-MCNC: NEGATIVE MG/DL
HGB UR QL STRIP: ABNORMAL
KETONES UR STRIP-MCNC: NEGATIVE MG/DL
LEUKOCYTE ESTERASE UR QL STRIP: ABNORMAL
NITRATE UR QL: NEGATIVE
NON-SQ EPI CELLS #/AREA URNS LPF: ABNORMAL /LPF
PH UR STRIP: 5.5 PH (ref 5–7)
RBC #/AREA URNS AUTO: ABNORMAL /HPF
SOURCE: ABNORMAL
SP GR UR STRIP: 1.02 (ref 1–1.03)
UROBILINOGEN UR STRIP-ACNC: 0.2 EU/DL (ref 0.2–1)
WBC #/AREA URNS AUTO: ABNORMAL /HPF

## 2020-12-02 PROCEDURE — 81001 URINALYSIS AUTO W/SCOPE: CPT | Performed by: NURSE PRACTITIONER

## 2020-12-02 PROCEDURE — 87186 SC STD MICRODIL/AGAR DIL: CPT | Performed by: NURSE PRACTITIONER

## 2020-12-02 PROCEDURE — 87086 URINE CULTURE/COLONY COUNT: CPT | Performed by: NURSE PRACTITIONER

## 2020-12-02 PROCEDURE — 87088 URINE BACTERIA CULTURE: CPT | Performed by: NURSE PRACTITIONER

## 2020-12-02 PROCEDURE — 99214 OFFICE O/P EST MOD 30 MIN: CPT | Performed by: NURSE PRACTITIONER

## 2020-12-02 RX ORDER — SUMATRIPTAN 25 MG/1
25-50 TABLET, FILM COATED ORAL
Qty: 18 TABLET | Refills: 1 | Status: SHIPPED | OUTPATIENT
Start: 2020-12-02 | End: 2022-10-14

## 2020-12-02 RX ORDER — SULFAMETHOXAZOLE/TRIMETHOPRIM 800-160 MG
1 TABLET ORAL 2 TIMES DAILY
Qty: 10 TABLET | Refills: 0 | Status: SHIPPED | OUTPATIENT
Start: 2020-12-02 | End: 2020-12-07

## 2020-12-02 ASSESSMENT — MIFFLIN-ST. JEOR: SCORE: 1351.63

## 2020-12-02 NOTE — PROGRESS NOTES
"Subjective     Kalina Schreiber is a 50 year old female who presents to clinic today for the following health issues:    HPI   Chief Complaint   Patient presents with     Urinary Problem     Refill Request     imitrex             Genitourinary - Female  Onset/Duration: 3rd day  Description:   Painful urination (Dysuria): YES- tingling feeling at the end of stream           Frequency: YES  Blood in urine (Hematuria): maybe just a little;   Delay in urine (Hesitency): no  Intensity: moderate  Progression of Symptoms:  same  Accompanying Signs & Symptoms:  Fever/chills: no  Flank pain: no  Nausea and vomiting: no  Vaginal symptoms: none  Abdominal/Pelvic Pain: YES- Sunday night  History:   History of frequent UTI s: no  History of kidney stones: no  Sexually Active: YES  Possibility of pregnancy: No  Precipitating or alleviating factors: None  Therapies tried and outcome: OTC advil or tylenol       Migraine     Since your last clinic visit, how have your headaches changed?  Improved    How often are you getting headaches or migraines? Very rare; has only had maybe 4 in her lifetime    Most recently in November 17     Are you able to do normal daily activities when you have a migraine? Yes    Are you taking rescue/relief medications? (Select all that apply) sumatriptan (Imitrex)    How helpful is your rescue/relief medication?  I get total relief    Are you taking any medications to prevent migraines? (Select all that apply)  No    In the past 4 weeks, how often have you gone to urgent care or the emergency room because of your headaches?  0      Review of Systems   Constitutional, HEENT, cardiovascular, pulmonary, gi and gu systems are negative, except as otherwise noted.      Objective    /80 (BP Location: Right arm)   Pulse 77   Temp 98.4  F (36.9  C) (Tympanic)   Ht 1.702 m (5' 7\")   Wt 69.4 kg (153 lb)   SpO2 99%   BMI 23.96 kg/m    Body mass index is 23.96 kg/m .  Physical Exam   GENERAL: healthy, " alert and no distress  NECK: no adenopathy, no asymmetry, masses, or scars and thyroid normal to palpation  RESP: lungs clear to auscultation - no rales, rhonchi or wheezes  CV: regular rate and rhythm, normal S1 S2, no S3 or S4, no murmur, click or rub, no peripheral edema and peripheral pulses strong  ABDOMEN: soft, nontender, no hepatosplenomegaly, no masses and bowel sounds normal. No CVA tenderness.    Results for orders placed or performed in visit on 12/02/20 (from the past 24 hour(s))   *UA reflex to Microscopic and Culture ([place] and [facility] (except [place] and [place])    Specimen: Midstream Urine   Result Value Ref Range    Color Urine Yellow     Appearance Urine Clear     Glucose Urine Negative NEG^Negative mg/dL    Bilirubin Urine Negative NEG^Negative    Ketones Urine Negative NEG^Negative mg/dL    Specific Gravity Urine 1.025 1.003 - 1.035    Blood Urine Trace (A) NEG^Negative    pH Urine 5.5 5.0 - 7.0 pH    Protein Albumin Urine Negative NEG^Negative mg/dL    Urobilinogen Urine 0.2 0.2 - 1.0 EU/dL    Nitrite Urine Negative NEG^Negative    Leukocyte Esterase Urine Small (A) NEG^Negative    Source Midstream Urine    Urine Microscopic   Result Value Ref Range    WBC Urine 10-25 (A) OTO5^0 - 5 /HPF    RBC Urine 2-5 (A) OTO2^O - 2 /HPF    Squamous Epithelial /LPF Urine Few FEW^Few /LPF    Bacteria Urine Few (A) NEG^Negative /HPF           Assessment & Plan     Migraine with aura and without status migrainosus, not intractable  Infrequent. Imitrex refilled.  - SUMAtriptan (IMITREX) 25 MG tablet; Take 1-2 tablets (25-50 mg) by mouth at onset of headache for migraine May repeat dose in 2 hours.  Do not exceed 200 mg in 24 hours    Acute cystitis with hematuria  - sulfamethoxazole-trimethoprim (BACTRIM DS) 800-160 MG tablet; Take 1 tablet by mouth 2 times daily for 5 days    Nonspecific finding on examination of urine  - Urine Culture Aerobic Bacterial    Symptoms involving urinary system  - *UA  reflex to Microscopic and Culture ([place] and [facility] (except [place] and [place])  - Urine Microscopic            Return in about 6 months (around 6/2/2021) for Physical Exam.    The risks, benefits and treatment options of prescribed medications or other treatments have been discussed with the patient. The patient verbalized their understanding and should call or follow up if no improvement or if they develop further problems.    PENNY [facility]

## 2020-12-03 LAB
BACTERIA SPEC CULT: ABNORMAL
SPECIMEN SOURCE: ABNORMAL

## 2021-01-26 ENCOUNTER — OFFICE VISIT (OUTPATIENT)
Dept: DERMATOLOGY | Facility: CLINIC | Age: 51
End: 2021-01-26
Payer: COMMERCIAL

## 2021-01-26 VITALS — RESPIRATION RATE: 16 BRPM | SYSTOLIC BLOOD PRESSURE: 129 MMHG | DIASTOLIC BLOOD PRESSURE: 83 MMHG | HEART RATE: 65 BPM

## 2021-01-26 DIAGNOSIS — L66.12 FRONTAL FIBROSING ALOPECIA: Primary | ICD-10-CM

## 2021-01-26 PROCEDURE — 99207 PR DROP WITH A PROCEDURE: CPT | Performed by: PHYSICIAN ASSISTANT

## 2021-01-26 PROCEDURE — 11900 INJECT SKIN LESIONS </W 7: CPT | Performed by: PHYSICIAN ASSISTANT

## 2021-01-26 NOTE — PROGRESS NOTES
Kalina Schreiber is an extremely pleasant 50 year old year old female patient here today for recheck frontal fibrosing alopecia. She denies any itchy, no noticeable hair loss.   Patient has no other skin complaints today.  Remainder of the HPI, Meds, PMH, Allergies, FH, and SH was reviewed in chart.    Pertinent Hx:   FFA  Past Medical History:   Diagnosis Date     Allergic rhinitis due to other allergen      Basal cell carcinoma      Solitary cyst of breast      Twin pregnancy, delivered        Past Surgical History:   Procedure Laterality Date     COLONOSCOPY  2/23/2012    Procedure:COLONOSCOPY; Colonoscopy  ; Surgeon:BOB TY; Location:[facility]     COLONOSCOPY N/A 5/6/2016    Procedure: COLONOSCOPY;  Surgeon: Markus Grossman MD;  Location: [facility]     ESOPHAGOSCOPY, GASTROSCOPY, DUODENOSCOPY (EGD), COMBINED N/A 5/6/2016    Procedure: COMBINED ESOPHAGOSCOPY, GASTROSCOPY, DUODENOSCOPY (EGD);  Surgeon: Markus Grossman MD;  Location: [facility]     LASER SURGERY OF EYE  8/21/2008    Both eyes     SURGICAL HISTORY OF -       T&A     SURGICAL HISTORY OF -       Csection     SURGICAL HISTORY OF -       breast augmentation        Family History   Problem Relation Age of Onset     Alcohol/Drug Mother      Cancer Mother         skin, COD: lung, liver, bone cancer     Melanoma Mother      Diabetes Mother         Post transplant onset     Alcohol/Drug Father      Hypertension Father      Cancer Maternal Grandmother         lung/liver     Asthma Maternal Grandmother      Cancer Maternal Grandfather         skin     Cancer - colorectal Maternal Grandfather      Melanoma Maternal Grandfather      C.A.D. No family hx of      Cerebrovascular Disease No family hx of      Breast Cancer No family hx of      Prostate Cancer No family hx of        Social History     Socioeconomic History     Marital status:      Spouse name: Not on file     Number of children: Not on file     Years of education: Not on file      Highest education level: Not on file   Occupational History     Employer: SELF   Social Needs     Financial resource strain: Not on file     Food insecurity     Worry: Not on file     Inability: Not on file     Transportation needs     Medical: Not on file     Non-medical: Not on file   Tobacco Use     Smoking status: Never Smoker     Smokeless tobacco: Never Used   Substance and Sexual Activity     Alcohol use: Yes     Comment: 1-2 drinks per week     Drug use: No     Sexual activity: Yes     Partners: Male     Birth control/protection: Surgical     Comment: vas   Lifestyle     Physical activity     Days per week: Not on file     Minutes per session: Not on file     Stress: Not on file   Relationships     Social connections     Talks on phone: Not on file     Gets together: Not on file     Attends Synagogue service: Not on file     Active member of club or organization: Not on file     Attends meetings of clubs or organizations: Not on file     Relationship status: Not on file     Intimate partner violence     Fear of current or ex partner: Not on file     Emotionally abused: Not on file     Physically abused: Not on file     Forced sexual activity: Not on file   Other Topics Concern     Parent/sibling w/ CABG, MI or angioplasty before 65F 55M? 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      Surrogate for twins       Outpatient Encounter Medications as of 1/26/2021   Medication Sig Dispense Refill     ferrous sulfate (IRON) 325 (65 FE) MG tablet Take 1 tablet (325 mg) by mouth 2 times daily 180 tablet 3     fexofenadine (ALLEGRA) 180 MG tablet Take 1 tablet (180 mg) by mouth daily 30 tablet 0     fluocinonide (LIDEX) 0.05 % external solution Apply twice daily as needed to frontal scalp. 60 mL 2     ibuprofen (ADVIL) 200 MG capsule Take 200 mg by mouth every 4 hours as needed.       Oxymetazoline HCl (NASAL SPRAY NA) Nasoquort 1 spray in each nostril daily when needed        SUMAtriptan (IMITREX) 25 MG tablet Take 1-2 tablets (25-50 mg) by mouth at onset of headache for migraine May repeat dose in 2 hours.  Do not exceed 200 mg in 24 hours 18 tablet 1     UNKNOWN TO PATIENT Allergy Eye Drops, 1 drop in each eye daily PRN       No facility-administered encounter medications on file as of 1/26/2021.              Review Of Systems  Skin: As above  Eyes: negative  Ears/Nose/Throat: negative  Respiratory: No shortness of breath, dyspnea on exertion, cough      O:   NAD, WDWN, Alert & Oriented, Mood & Affect wnl, Vitals stable   Here today alone   /83 (BP Location: Left arm, Patient Position: Sitting, Cuff Size: Adult Regular)   Pulse 65   Resp 16    General appearance normal   Vitals stable   Alert, oriented and in no acute distress     Mild erythematous scaly perifollicular papules on frontal scalp (picture taken)      Eyes: Conjunctivae/lids:Normal     ENT: Lips: normal    MSK:Normal    Pulm: Breathing Normal    Neuro/Psych: Orientation:Alert and Orientedx3 ; Mood/Affect:normal     A/P:  1. Frontal fibrosing Alopecia  IL TAC: PGACAC discussed.  Risks including but not limited to injection site reaction, bruising, no resolution.  All questions answered and entertained to patient s satisfaction.  Informed consent obtained.  IL TAC in concentration of 10mg/ml was injected ID to frontal fibrosing alopecia.  Total injected was  0.4  ml.  Patient tolerated without complications and given wound care instructions, including not to move product around.  Return in 4 weeks for follow-up and possible additional IL TAC.      Apply lidex solution as needed.   Lot: [id number]   Exp: 4/2022

## 2021-01-26 NOTE — NURSING NOTE
"Initial /83 (BP Location: Left arm, Patient Position: Sitting, Cuff Size: Adult Regular)   Pulse 65   Resp 16  Estimated body mass index is 23.96 kg/m  as calculated from the following:    Height as of 12/2/20: 1.702 m (5' 7\").    Weight as of 12/2/20: 69.4 kg (153 lb). .    Jessica Burgess, [facility]    "

## 2021-01-26 NOTE — LETTER
1/26/2021         RE: Kalina Schreiber  [address] [facility] [address]        Dear Colleague,    Thank you for referring your patient, Kalina Schreiber, to the [facility]. Please see a copy of my visit note below.    Kalina Schreiber is an extremely pleasant 50 year old year old female patient here today for recheck frontal fibrosing alopecia. She denies any itchy, no noticeable hair loss.   Patient has no other skin complaints today.  Remainder of the HPI, Meds, PMH, Allergies, FH, and SH was reviewed in chart.    Pertinent Hx:   FFA  Past Medical History:   Diagnosis Date     Allergic rhinitis due to other allergen      Basal cell carcinoma      Solitary cyst of breast      Twin pregnancy, delivered        Past Surgical History:   Procedure Laterality Date     COLONOSCOPY  2/23/2012    Procedure:COLONOSCOPY; Colonoscopy  ; Surgeon:BOB TY; Location:[facility]     COLONOSCOPY N/A 5/6/2016    Procedure: COLONOSCOPY;  Surgeon: Markus Grossman MD;  Location: [facility]     ESOPHAGOSCOPY, GASTROSCOPY, DUODENOSCOPY (EGD), COMBINED N/A 5/6/2016    Procedure: COMBINED ESOPHAGOSCOPY, GASTROSCOPY, DUODENOSCOPY (EGD);  Surgeon: Markus Grossman MD;  Location: [facility]     LASER SURGERY OF EYE  8/21/2008    Both eyes     SURGICAL HISTORY OF -       T&A     SURGICAL HISTORY OF -       Csection     SURGICAL HISTORY OF -       breast augmentation        Family History   Problem Relation Age of Onset     Alcohol/Drug Mother      Cancer Mother         skin, COD: lung, liver, bone cancer     Melanoma Mother      Diabetes Mother         Post transplant onset     Alcohol/Drug Father      Hypertension Father      Cancer Maternal Grandmother         lung/liver     Asthma Maternal Grandmother      Cancer Maternal Grandfather         skin     Cancer - colorectal Maternal Grandfather      Melanoma Maternal Grandfather      C.A.D. No family hx of      Cerebrovascular Disease No family hx of       Breast Cancer No family hx of      Prostate Cancer No family hx of        Social History     Socioeconomic History     Marital status:      Spouse name: Not on file     Number of children: Not on file     Years of education: Not on file     Highest education level: Not on file   Occupational History     Employer: SELF   Social Needs     Financial resource strain: Not on file     Food insecurity     Worry: Not on file     Inability: Not on file     Transportation needs     Medical: Not on file     Non-medical: Not on file   Tobacco Use     Smoking status: Never Smoker     Smokeless tobacco: Never Used   Substance and Sexual Activity     Alcohol use: Yes     Comment: 1-2 drinks per week     Drug use: No     Sexual activity: Yes     Partners: Male     Birth control/protection: Surgical     Comment: vas   Lifestyle     Physical activity     Days per week: Not on file     Minutes per session: Not on file     Stress: Not on file   Relationships     Social connections     Talks on phone: Not on file     Gets together: Not on file     Attends Mormonism service: Not on file     Active member of club or organization: Not on file     Attends meetings of clubs or organizations: Not on file     Relationship status: Not on file     Intimate partner violence     Fear of current or ex partner: Not on file     Emotionally abused: Not on file     Physically abused: Not on file     Forced sexual activity: Not on file   Other Topics Concern     Parent/sibling w/ CABG, MI or angioplasty before 65F 55M? 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      Surrogate for twins       Outpatient Encounter Medications as of 1/26/2021   Medication Sig Dispense Refill     ferrous sulfate (IRON) 325 (65 FE) MG tablet Take 1 tablet (325 mg) by mouth 2 times daily 180 tablet 3     fexofenadine (ALLEGRA) 180 MG tablet Take 1 tablet (180 mg) by mouth daily 30 tablet 0     fluocinonide (LIDEX) 0.05 %  external solution Apply twice daily as needed to frontal scalp. 60 mL 2     ibuprofen (ADVIL) 200 MG capsule Take 200 mg by mouth every 4 hours as needed.       Oxymetazoline HCl (NASAL SPRAY NA) Nasoquort 1 spray in each nostril daily when needed       SUMAtriptan (IMITREX) 25 MG tablet Take 1-2 tablets (25-50 mg) by mouth at onset of headache for migraine May repeat dose in 2 hours.  Do not exceed 200 mg in 24 hours 18 tablet 1     UNKNOWN TO PATIENT Allergy Eye Drops, 1 drop in each eye daily PRN       No facility-administered encounter medications on file as of 1/26/2021.              Review Of Systems  Skin: As above  Eyes: negative  Ears/Nose/Throat: negative  Respiratory: No shortness of breath, dyspnea on exertion, cough      O:   NAD, WDWN, Alert & Oriented, Mood & Affect wnl, Vitals stable   Here today alone   /83 (BP Location: Left arm, Patient Position: Sitting, Cuff Size: Adult Regular)   Pulse 65   Resp 16    General appearance normal   Vitals stable   Alert, oriented and in no acute distress     Mild erythematous scaly perifollicular papules on frontal scalp (picture taken)      Eyes: Conjunctivae/lids:Normal     ENT: Lips: normal    MSK:Normal    Pulm: Breathing Normal    Neuro/Psych: Orientation:Alert and Orientedx3 ; Mood/Affect:normal     A/P:  1. Frontal fibrosing Alopecia  IL TAC: PGACAC discussed.  Risks including but not limited to injection site reaction, bruising, no resolution.  All questions answered and entertained to patient s satisfaction.  Informed consent obtained.  IL TAC in concentration of 10mg/ml was injected ID to frontal fibrosing alopecia.  Total injected was  0.4  ml.  Patient tolerated without complications and given wound care instructions, including not to move product around.  Return in 4 weeks for follow-up and possible additional IL TAC.      Apply lidex solution as needed.   Lot: [id number]   Exp: 4/2022        Again, thank you for allowing me to participate in the  care of your patient.        Sincerely,        Talisha Aguilar PA-C

## 2021-04-28 ENCOUNTER — OFFICE VISIT (OUTPATIENT)
Dept: DERMATOLOGY | Facility: CLINIC | Age: 51
End: 2021-04-28
Payer: COMMERCIAL

## 2021-04-28 VITALS
SYSTOLIC BLOOD PRESSURE: 114 MMHG | HEIGHT: 68 IN | HEART RATE: 70 BPM | BODY MASS INDEX: 23.26 KG/M2 | DIASTOLIC BLOOD PRESSURE: 71 MMHG

## 2021-04-28 DIAGNOSIS — L66.12 FRONTAL FIBROSING ALOPECIA: Primary | ICD-10-CM

## 2021-04-28 PROCEDURE — 99207 PR DROP WITH A PROCEDURE: CPT | Performed by: PHYSICIAN ASSISTANT

## 2021-04-28 PROCEDURE — 11900 INJECT SKIN LESIONS </W 7: CPT | Performed by: PHYSICIAN ASSISTANT

## 2021-04-28 NOTE — LETTER
4/28/2021         RE: Kalina Schreiber  [address] [facility] [address]        Dear Colleague,    Thank you for referring your patient, Kalina Schreiber, to the [facility]. Please see a copy of my visit note below.    Kalina Schreiber is an extremely pleasant 50 year old year old female patient here today for recheck frontal fibrosing alopecia. Using topical steroid three times weekly. No increased hair loss, has noticed more redness when she forgot to use topical.  Patient has no other skin complaints today.  Remainder of the HPI, Meds, PMH, Allergies, FH, and SH was reviewed in chart.    Past Medical History:   Diagnosis Date     Allergic rhinitis due to other allergen      Basal cell carcinoma      Solitary cyst of breast      Twin pregnancy, delivered        Past Surgical History:   Procedure Laterality Date     COLONOSCOPY  2/23/2012    Procedure:COLONOSCOPY; Colonoscopy  ; Surgeon:BOB TY; Location:[facility]     COLONOSCOPY N/A 5/6/2016    Procedure: COLONOSCOPY;  Surgeon: Markus Grossman MD;  Location: [facility]     ESOPHAGOSCOPY, GASTROSCOPY, DUODENOSCOPY (EGD), COMBINED N/A 5/6/2016    Procedure: COMBINED ESOPHAGOSCOPY, GASTROSCOPY, DUODENOSCOPY (EGD);  Surgeon: Markus Grossman MD;  Location: [facility]     LASER SURGERY OF EYE  8/21/2008    Both eyes     SURGICAL HISTORY OF -       T&A     SURGICAL HISTORY OF -       Csection     SURGICAL HISTORY OF -       breast augmentation        Family History   Problem Relation Age of Onset     Alcohol/Drug Mother      Cancer Mother         skin, COD: lung, liver, bone cancer     Melanoma Mother      Diabetes Mother         Post transplant onset     Alcohol/Drug Father      Hypertension Father      Cancer Maternal Grandmother         lung/liver     Asthma Maternal Grandmother      Cancer Maternal Grandfather         skin     Cancer - colorectal Maternal Grandfather      Melanoma Maternal Grandfather      C.A.D. No family  hx of      Cerebrovascular Disease No family hx of      Breast Cancer No family hx of      Prostate Cancer No family hx of        Social History     Socioeconomic History     Marital status:      Spouse name: Not on file     Number of children: Not on file     Years of education: Not on file     Highest education level: Not on file   Occupational History     Employer: SELF   Social Needs     Financial resource strain: Not on file     Food insecurity     Worry: Not on file     Inability: Not on file     Transportation needs     Medical: Not on file     Non-medical: Not on file   Tobacco Use     Smoking status: Never Smoker     Smokeless tobacco: Never Used   Substance and Sexual Activity     Alcohol use: Yes     Comment: 1-2 drinks per week     Drug use: No     Sexual activity: Yes     Partners: Male     Birth control/protection: Surgical     Comment: vas   Lifestyle     Physical activity     Days per week: Not on file     Minutes per session: Not on file     Stress: Not on file   Relationships     Social connections     Talks on phone: Not on file     Gets together: Not on file     Attends Orthodoxy service: Not on file     Active member of club or organization: Not on file     Attends meetings of clubs or organizations: Not on file     Relationship status: Not on file     Intimate partner violence     Fear of current or ex partner: Not on file     Emotionally abused: Not on file     Physically abused: Not on file     Forced sexual activity: Not on file   Other Topics Concern     Parent/sibling w/ CABG, MI or angioplasty before 65F 55M? 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      Surrogate for twins       Outpatient Encounter Medications as of 4/28/2021   Medication Sig Dispense Refill     ferrous sulfate (IRON) 325 (65 FE) MG tablet Take 1 tablet (325 mg) by mouth 2 times daily 180 tablet 3     fexofenadine (ALLEGRA) 180 MG tablet Take 1 tablet (180 mg) by mouth  "daily 30 tablet 0     fluocinonide (LIDEX) 0.05 % external solution Apply twice daily as needed to frontal scalp. 60 mL 2     ibuprofen (ADVIL) 200 MG capsule Take 200 mg by mouth every 4 hours as needed.       Oxymetazoline HCl (NASAL SPRAY NA) Nasoquort 1 spray in each nostril daily when needed       SUMAtriptan (IMITREX) 25 MG tablet Take 1-2 tablets (25-50 mg) by mouth at onset of headache for migraine May repeat dose in 2 hours.  Do not exceed 200 mg in 24 hours 18 tablet 1     UNKNOWN TO PATIENT Allergy Eye Drops, 1 drop in each eye daily PRN       No facility-administered encounter medications on file as of 4/28/2021.              O:   NAD, WDWN, Alert & Oriented, Mood & Affect wnl, Vitals stable   Here today alone   /71   Pulse 70   Ht 1.727 m (5' 8\")   BMI 23.26 kg/m     General appearance normal   Vitals stable   Alert, oriented and in no acute distress     Mild erythematous scaly perifollicular papules on frontal scalp, stable from previous photo      Eyes: Conjunctivae/lids:Normal     ENT: Lips: normal    MSK:Normal    Pulm: Breathing Normal     Neuro/Psych: Orientation:Alert and Orientedx3 ; Mood/Affect:normal     A/P:  1. Frontal fibrosing Alopecia  IL TAC: PGACAC discussed.  Risks including but not limited to injection site reaction, bruising, no resolution.  All questions answered and entertained to patient s satisfaction.  Informed consent obtained.  IL TAC in concentration of 10mg/ml was injected ID to frontal fibrosing alopecia.  Total injected was  0.4  ml.  Patient tolerated without complications and given wound care instructions, including not to move product around.  Return in 4 weeks for follow-up and possible additional IL TAC.       Apply lidex solution as needed.   Lot: [id number]   Exp: 4/2022         Again, thank you for allowing me to participate in the care of your patient.        Sincerely,        Talisha Aguilar PA-C    "

## 2021-04-30 NOTE — PROGRESS NOTES
Kalina Schreiber is an extremely pleasant 50 year old year old female patient here today for recheck frontal fibrosing alopecia. Using topical steroid three times weekly. No increased hair loss, has noticed more redness when she forgot to use topical.  Patient has no other skin complaints today.  Remainder of the HPI, Meds, PMH, Allergies, FH, and SH was reviewed in chart.    Past Medical History:   Diagnosis Date     Allergic rhinitis due to other allergen      Basal cell carcinoma      Solitary cyst of breast      Twin pregnancy, delivered        Past Surgical History:   Procedure Laterality Date     COLONOSCOPY  2/23/2012    Procedure:COLONOSCOPY; Colonoscopy  ; Surgeon:BOB TY; Location:[facility]     COLONOSCOPY N/A 5/6/2016    Procedure: COLONOSCOPY;  Surgeon: Markus Grossman MD;  Location: [facility]     ESOPHAGOSCOPY, GASTROSCOPY, DUODENOSCOPY (EGD), COMBINED N/A 5/6/2016    Procedure: COMBINED ESOPHAGOSCOPY, GASTROSCOPY, DUODENOSCOPY (EGD);  Surgeon: Markus Grossman MD;  Location: [facility]     LASER SURGERY OF EYE  8/21/2008    Both eyes     SURGICAL HISTORY OF -       T&A     SURGICAL HISTORY OF -       Csection     SURGICAL HISTORY OF -       breast augmentation        Family History   Problem Relation Age of Onset     Alcohol/Drug Mother      Cancer Mother         skin, COD: lung, liver, bone cancer     Melanoma Mother      Diabetes Mother         Post transplant onset     Alcohol/Drug Father      Hypertension Father      Cancer Maternal Grandmother         lung/liver     Asthma Maternal Grandmother      Cancer Maternal Grandfather         skin     Cancer - colorectal Maternal Grandfather      Melanoma Maternal Grandfather      C.A.D. No family hx of      Cerebrovascular Disease No family hx of      Breast Cancer No family hx of      Prostate Cancer No family hx of        Social History     Socioeconomic History     Marital status:      Spouse name: Not on file     Number of children: Not  on file     Years of education: Not on file     Highest education level: Not on file   Occupational History     Employer: SELF   Social Needs     Financial resource strain: Not on file     Food insecurity     Worry: Not on file     Inability: Not on file     Transportation needs     Medical: Not on file     Non-medical: Not on file   Tobacco Use     Smoking status: Never Smoker     Smokeless tobacco: Never Used   Substance and Sexual Activity     Alcohol use: Yes     Comment: 1-2 drinks per week     Drug use: No     Sexual activity: Yes     Partners: Male     Birth control/protection: Surgical     Comment: vas   Lifestyle     Physical activity     Days per week: Not on file     Minutes per session: Not on file     Stress: Not on file   Relationships     Social connections     Talks on phone: Not on file     Gets together: Not on file     Attends Roman Catholic service: Not on file     Active member of club or organization: Not on file     Attends meetings of clubs or organizations: Not on file     Relationship status: Not on file     Intimate partner violence     Fear of current or ex partner: Not on file     Emotionally abused: Not on file     Physically abused: Not on file     Forced sexual activity: Not on file   Other Topics Concern     Parent/sibling w/ CABG, MI or angioplasty before 65F 55M? 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      Surrogate for twins       Outpatient Encounter Medications as of 4/28/2021   Medication Sig Dispense Refill     ferrous sulfate (IRON) 325 (65 FE) MG tablet Take 1 tablet (325 mg) by mouth 2 times daily 180 tablet 3     fexofenadine (ALLEGRA) 180 MG tablet Take 1 tablet (180 mg) by mouth daily 30 tablet 0     fluocinonide (LIDEX) 0.05 % external solution Apply twice daily as needed to frontal scalp. 60 mL 2     ibuprofen (ADVIL) 200 MG capsule Take 200 mg by mouth every 4 hours as needed.       Oxymetazoline HCl (NASAL SPRAY NA) Nasoquort 1  "spray in each nostril daily when needed       SUMAtriptan (IMITREX) 25 MG tablet Take 1-2 tablets (25-50 mg) by mouth at onset of headache for migraine May repeat dose in 2 hours.  Do not exceed 200 mg in 24 hours 18 tablet 1     UNKNOWN TO PATIENT Allergy Eye Drops, 1 drop in each eye daily PRN       No facility-administered encounter medications on file as of 4/28/2021.              O:   NAD, WDWN, Alert & Oriented, Mood & Affect wnl, Vitals stable   Here today alone   /71   Pulse 70   Ht 1.727 m (5' 8\")   BMI 23.26 kg/m     General appearance normal   Vitals stable   Alert, oriented and in no acute distress     Mild erythematous scaly perifollicular papules on frontal scalp, stable from previous photo      Eyes: Conjunctivae/lids:Normal     ENT: Lips: normal    MSK:Normal    Pulm: Breathing Normal     Neuro/Psych: Orientation:Alert and Orientedx3 ; Mood/Affect:normal     A/P:  1. Frontal fibrosing Alopecia  IL TAC: PGACAC discussed.  Risks including but not limited to injection site reaction, bruising, no resolution.  All questions answered and entertained to patient s satisfaction.  Informed consent obtained.  IL TAC in concentration of 10mg/ml was injected ID to frontal fibrosing alopecia.  Total injected was  0.4  ml.  Patient tolerated without complications and given wound care instructions, including not to move product around.  Return in 4 weeks for follow-up and possible additional IL TAC.       Apply lidex solution as needed.   Lot: [id number]   Exp: 4/2022     "

## 2021-08-13 ENCOUNTER — OFFICE VISIT (OUTPATIENT)
Dept: FAMILY MEDICINE | Facility: CLINIC | Age: 51
End: 2021-08-13
Payer: COMMERCIAL

## 2021-08-13 VITALS
HEART RATE: 72 BPM | WEIGHT: 150 LBS | SYSTOLIC BLOOD PRESSURE: 112 MMHG | HEIGHT: 68 IN | RESPIRATION RATE: 18 BRPM | BODY MASS INDEX: 22.73 KG/M2 | TEMPERATURE: 97.7 F | OXYGEN SATURATION: 98 % | DIASTOLIC BLOOD PRESSURE: 78 MMHG

## 2021-08-13 DIAGNOSIS — R10.84 ABDOMINAL PAIN, GENERALIZED: Primary | ICD-10-CM

## 2021-08-13 DIAGNOSIS — Z23 HIGH PRIORITY FOR 2019-NCOV VACCINE: ICD-10-CM

## 2021-08-13 PROBLEM — L66.12 FRONTAL FIBROSING ALOPECIA: Status: ACTIVE | Noted: 2021-08-13

## 2021-08-13 LAB
ALBUMIN SERPL-MCNC: 3.8 G/DL (ref 3.4–5)
ALP SERPL-CCNC: 70 U/L (ref 40–150)
ALT SERPL W P-5'-P-CCNC: 19 U/L (ref 0–50)
ANION GAP SERPL CALCULATED.3IONS-SCNC: 6 MMOL/L (ref 3–14)
AST SERPL W P-5'-P-CCNC: 10 U/L (ref 0–45)
BILIRUB SERPL-MCNC: 0.5 MG/DL (ref 0.2–1.3)
BUN SERPL-MCNC: 14 MG/DL (ref 7–30)
CALCIUM SERPL-MCNC: 8.9 MG/DL (ref 8.5–10.1)
CHLORIDE BLD-SCNC: 105 MMOL/L (ref 94–109)
CO2 SERPL-SCNC: 28 MMOL/L (ref 20–32)
CREAT SERPL-MCNC: 0.95 MG/DL (ref 0.52–1.04)
ERYTHROCYTE [DISTWIDTH] IN BLOOD BY AUTOMATED COUNT: 12.1 % (ref 10–15)
GFR SERPL CREATININE-BSD FRML MDRD: 71 ML/MIN/1.73M2
GLUCOSE BLD-MCNC: 60 MG/DL (ref 70–99)
HCT VFR BLD AUTO: 40.2 % (ref 35–47)
HGB BLD-MCNC: 13.1 G/DL (ref 11.7–15.7)
MCH RBC QN AUTO: 31.2 PG (ref 26.5–33)
MCHC RBC AUTO-ENTMCNC: 32.6 G/DL (ref 31.5–36.5)
MCV RBC AUTO: 96 FL (ref 78–100)
PLATELET # BLD AUTO: 322 10E3/UL (ref 150–450)
POTASSIUM BLD-SCNC: 3.7 MMOL/L (ref 3.4–5.3)
PROT SERPL-MCNC: 6.9 G/DL (ref 6.8–8.8)
RBC # BLD AUTO: 4.2 10E6/UL (ref 3.8–5.2)
SODIUM SERPL-SCNC: 139 MMOL/L (ref 133–144)
WBC # BLD AUTO: 4.9 10E3/UL (ref 4–11)

## 2021-08-13 PROCEDURE — 80053 COMPREHEN METABOLIC PANEL: CPT | Performed by: NURSE PRACTITIONER

## 2021-08-13 PROCEDURE — 85027 COMPLETE CBC AUTOMATED: CPT | Performed by: NURSE PRACTITIONER

## 2021-08-13 PROCEDURE — 90471 IMMUNIZATION ADMIN: CPT | Performed by: NURSE PRACTITIONER

## 2021-08-13 PROCEDURE — 90714 TD VACC NO PRESV 7 YRS+ IM: CPT | Performed by: NURSE PRACTITIONER

## 2021-08-13 PROCEDURE — 36415 COLL VENOUS BLD VENIPUNCTURE: CPT | Performed by: NURSE PRACTITIONER

## 2021-08-13 PROCEDURE — 91300 COVID-19,PF,PFIZER: CPT | Performed by: NURSE PRACTITIONER

## 2021-08-13 PROCEDURE — 0001A COVID-19,PF,PFIZER: CPT | Performed by: NURSE PRACTITIONER

## 2021-08-13 PROCEDURE — 99214 OFFICE O/P EST MOD 30 MIN: CPT | Mod: 25 | Performed by: NURSE PRACTITIONER

## 2021-08-13 ASSESSMENT — MIFFLIN-ST. JEOR: SCORE: 1353.9

## 2021-08-13 NOTE — PROGRESS NOTES
Assessment & Plan     Abdominal pain, generalized  Etiology unclear.  Labs today.  H2 blocker for 2 weeks.  If no improvement, schedule CT scan.    - CBC with platelets; Future  - Comprehensive metabolic panel (BMP + Alb, Alk Phos, ALT, AST, Total. Bili, TP); Future  - CT Abdomen Pelvis w Contrast; Future  - CBC with platelets  - Comprehensive metabolic panel (BMP + Alb, Alk Phos, ALT, AST, Total. Bili, TP)    High priority for 2019-nCoV vaccine      Patient Instructions   Labs today.  Take famotidine 20 mg twice daily for 2 weeks.    Schedule CT scan: [phone]      Return in about 4 weeks (around 9/10/2021).      The risks, benefits and treatment options of prescribed medications or other treatments have been discussed with the patient. The patient verbalized their understanding and should call or follow up if no improvement or if they develop further problems.    PENNY Nathan CNP  [facility] Gilman is a 50 year old who presents for the following health issues     HPI     Discuss Covid shot with pt, does want Tdap as well.     Abdominal/Flank Pain  Onset/Duration: Since the end of June, feeling as if it could be stress related.   Description:   Character: Dull ache, pit feeling in stomach. Did have a sharp pain in the stomach a couple nights   Location: eddy-umbilical region  Radiation: None  Intensity: mild  Progression of Symptoms: same waxing and waning  Accompanying Signs & Symptoms:  Fever/Chills: no  Gas/Bloating: no  Nausea: YES  Vomitting: no  Diarrhea: no  Constipation: no  Dysuria or Hematuria: no  History:   Trauma: no  Previous similar pain: no  Previous tests done: none  Precipitating factors:   Does the pain change with:     Food: no    Bowel Movement: no    Urination: no   Other factors:  YES- has been under a lot of stress lately.   Therapies tried and outcome: relaxing   No LMP recorded.          Review of Systems   Constitutional, HEENT,  "cardiovascular, pulmonary, gi and gu systems are negative, except as otherwise noted.      Objective    /78   Pulse 72   Temp 97.7  F (36.5  C) (Tympanic)   Resp 18   Ht 1.727 m (5' 8\")   Wt 68 kg (150 lb)   SpO2 98%   BMI 22.81 kg/m    Body mass index is 22.81 kg/m .  Physical Exam   GENERAL: healthy, alert and no distress  HENT: normal cephalic/atraumatic and ear canals and TM's normal  NECK: no adenopathy, no asymmetry, masses, or scars and thyroid normal to palpation  RESP: lungs clear to auscultation - no rales, rhonchi or wheezes  CV: regular rate and rhythm, normal S1 S2, no S3 or S4, no murmur, click or rub, no peripheral edema and peripheral pulses strong  ABDOMEN: soft, nontender, no hepatosplenomegaly, no masses and bowel sounds normal  MS: no gross musculoskeletal defects noted, no edema                "

## 2021-09-02 ENCOUNTER — OFFICE VISIT (OUTPATIENT)
Dept: DERMATOLOGY | Facility: CLINIC | Age: 51
End: 2021-09-02
Payer: COMMERCIAL

## 2021-09-02 ENCOUNTER — IMMUNIZATION (OUTPATIENT)
Dept: FAMILY MEDICINE | Facility: CLINIC | Age: 51
End: 2021-09-02
Attending: NURSE PRACTITIONER
Payer: COMMERCIAL

## 2021-09-02 VITALS — DIASTOLIC BLOOD PRESSURE: 74 MMHG | OXYGEN SATURATION: 100 % | SYSTOLIC BLOOD PRESSURE: 119 MMHG | HEART RATE: 50 BPM

## 2021-09-02 DIAGNOSIS — L66.12 FRONTAL FIBROSING ALOPECIA: Primary | ICD-10-CM

## 2021-09-02 DIAGNOSIS — Z85.828 HISTORY OF BASAL CELL CARCINOMA: ICD-10-CM

## 2021-09-02 DIAGNOSIS — D18.01 CHERRY ANGIOMA: ICD-10-CM

## 2021-09-02 DIAGNOSIS — L82.1 SEBORRHEIC KERATOSIS: ICD-10-CM

## 2021-09-02 DIAGNOSIS — D22.9 MULTIPLE BENIGN NEVI: ICD-10-CM

## 2021-09-02 DIAGNOSIS — L81.4 LENTIGO: ICD-10-CM

## 2021-09-02 PROCEDURE — 0002A PR COVID VAC PFIZER DIL RECON 30 MCG/0.3 ML IM: CPT

## 2021-09-02 PROCEDURE — 99213 OFFICE O/P EST LOW 20 MIN: CPT | Mod: 25 | Performed by: PHYSICIAN ASSISTANT

## 2021-09-02 PROCEDURE — 91300 PR COVID VAC PFIZER DIL RECON 30 MCG/0.3 ML IM: CPT

## 2021-09-02 PROCEDURE — 11900 INJECT SKIN LESIONS </W 7: CPT | Performed by: PHYSICIAN ASSISTANT

## 2021-09-02 NOTE — PROGRESS NOTES
Kalina Schreiber is an extremely pleasant 50 year old year old female patient here today for recheck FFA and skin check. She notes hair loss has been stable with topical lidex solution and intralesional kenalog. Patient has no other skin complaints today.  Remainder of the HPI, Meds, PMH, Allergies, FH, and SH was reviewed in chart.    Pertinent Hx:  History of BCC  Past Medical History:   Diagnosis Date     Allergic rhinitis due to other allergen      Basal cell carcinoma      Solitary cyst of breast      Twin pregnancy, delivered        Past Surgical History:   Procedure Laterality Date     COLONOSCOPY  2/23/2012    Procedure:COLONOSCOPY; Colonoscopy  ; Surgeon:BOB TY; Location:[facility]     COLONOSCOPY N/A 5/6/2016    Procedure: COLONOSCOPY;  Surgeon: Markus Grossman MD;  Location: [facility]     ESOPHAGOSCOPY, GASTROSCOPY, DUODENOSCOPY (EGD), COMBINED N/A 5/6/2016    Procedure: COMBINED ESOPHAGOSCOPY, GASTROSCOPY, DUODENOSCOPY (EGD);  Surgeon: Markus Grossman MD;  Location: [facility]     LASER SURGERY OF EYE  8/21/2008    Both eyes     SURGICAL HISTORY OF -       T&A     SURGICAL HISTORY OF -       Csection     SURGICAL HISTORY OF -       breast augmentation        Family History   Problem Relation Age of Onset     Alcohol/Drug Mother      Cancer Mother         skin, COD: lung, liver, bone cancer     Melanoma Mother      Diabetes Mother         Post transplant onset     Alcohol/Drug Father      Hypertension Father      Cancer Maternal Grandmother         lung/liver     Asthma Maternal Grandmother      Cancer Maternal Grandfather         skin     Cancer - colorectal Maternal Grandfather      Melanoma Maternal Grandfather      C.A.D. No family hx of      Cerebrovascular Disease No family hx of      Breast Cancer No family hx of      Prostate Cancer No family hx of        Social History     Socioeconomic History     Marital status:      Spouse name: Not on file     Number of children: Not on file      Years of education: Not on file     Highest education level: Not on file   Occupational History     Employer: SELF   Tobacco Use     Smoking status: Never Smoker     Smokeless tobacco: Never Used   Substance and Sexual Activity     Alcohol use: Yes     Comment: 1-2 drinks per week     Drug use: No     Sexual activity: Yes     Partners: Male     Birth control/protection: Surgical     Comment: vas   Other Topics Concern     Parent/sibling w/ CABG, MI or angioplasty before 65F 55M? 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      Surrogate for twins     Social Determinants of Health     Financial Resource Strain:      Difficulty of Paying Living Expenses:    Food Insecurity:      Worried About Running Out of Food in the Last Year:      Ran Out of Food in the Last Year:    Transportation Needs:      Lack of Transportation (Medical):      Lack of Transportation (Non-Medical):    Physical Activity:      Days of Exercise per Week:      Minutes of Exercise per Session:    Stress:      Feeling of Stress :    Social Connections:      Frequency of Communication with Friends and Family:      Frequency of Social Gatherings with Friends and Family:      Attends Church Services:      Active Member of Clubs or Organizations:      Attends Club or Organization Meetings:      Marital Status:    Intimate Partner Violence:      Fear of Current or Ex-Partner:      Emotionally Abused:      Physically Abused:      Sexually Abused:        Outpatient Encounter Medications as of 9/2/2021   Medication Sig Dispense Refill     ferrous sulfate (IRON) 325 (65 FE) MG tablet Take 1 tablet (325 mg) by mouth 2 times daily 180 tablet 3     fexofenadine (ALLEGRA) 180 MG tablet Take 1 tablet (180 mg) by mouth daily 30 tablet 0     fluocinonide (LIDEX) 0.05 % external solution Apply twice daily as needed to frontal scalp. 60 mL 2     ibuprofen (ADVIL) 200 MG capsule Take 200 mg by mouth every 4 hours as  needed.       Oxymetazoline HCl (NASAL SPRAY NA) Nasoquort 1 spray in each nostril daily when needed       SUMAtriptan (IMITREX) 25 MG tablet Take 1-2 tablets (25-50 mg) by mouth at onset of headache for migraine May repeat dose in 2 hours.  Do not exceed 200 mg in 24 hours 18 tablet 1     UNKNOWN TO PATIENT Allergy Eye Drops, 1 drop in each eye daily PRN       No facility-administered encounter medications on file as of 9/2/2021.             O:   NAD, WDWN, Alert & Oriented, Mood & Affect wnl, Vitals stable   Here today alone   /74 (BP Location: Right arm, Patient Position: Sitting, Cuff Size: Adult Regular)   Pulse 50   SpO2 100%    General appearance normal   Vitals stable   Alert, oriented and in no acute distress     Mild erythematous scaly perifollicular papules on frontal scalp  Stuck on papules and brown macules on trunk and ext   Red papules on trunk  Brown papules and macules with regular pigment network and borders on torso and extremities      The remainder of skin exam is normal       Eyes: Conjunctivae/lids:Normal     ENT: Lips, buccal mucosa, tongue: normal    MSK:Normal    Cardiovascular: peripheral edema none    Pulm: Breathing Normal    Lymph Nodes: No Head and Neck Lymphadenopathy     Neuro/Psych: Orientation:Alert and Orientedx3 ; Mood/Affect:normal   A/P:  1. FFA stable   IL TAC: PGACAC discussed.  Risks including but not limited to injection site reaction, bruising, no resolution.  All questions answered and entertained to patient s satisfaction.  Informed consent obtained.  IL TAC in concentration of 10 mg/ml was injected ID to frontal fibrosing alopecia.  Total injected was  0.3 ml.  Patient tolerated without complications and given wound care instructions, including not to move product around.  Return in 4 weeks for follow-up and possible additional IL TAC.    2.  Seborrheic keratosis, lentigo, angioma, benign nevi, History of BCC  BENIGN LESIONS DISCUSSED WITH PATIENT:  I discussed  the specifics of tumor, prognosis, and genetics of benign lesions.  I explained that treatment of these lesions would be purely cosmetic and not medically neccessary.  I discussed with patient different removal options including excision, cautery and /or laser.      Nature and genetics of benign skin lesions dicussed with patient.  Signs and Symptoms of skin cancer discussed with patient.  ABCDEs of melanoma reviewed with patient.  Patient encouraged to perform monthly skin exams.  UV precautions reviewed with patient.  Risks of non-melanoma skin cancer discussed with patient   Return to clinic in 6 months.

## 2021-09-02 NOTE — LETTER
9/2/2021         RE: Kalina Schreiber  [address] [address] [facility] [id number]        Dear Colleague,    Thank you for referring your patient, Kalina Schreiber, to the [facility]. Please see a copy of my visit note below.    Kalina Schreiber is an extremely pleasant 50 year old year old female patient here today for recheck FFA and skin check. She notes hair loss has been stable with topical lidex solution and intralesional kenalog. Patient has no other skin complaints today.  Remainder of the HPI, Meds, PMH, Allergies, FH, and SH was reviewed in chart.    Pertinent Hx:  History of BCC  Past Medical History:   Diagnosis Date     Allergic rhinitis due to other allergen      Basal cell carcinoma      Solitary cyst of breast      Twin pregnancy, delivered        Past Surgical History:   Procedure Laterality Date     COLONOSCOPY  2/23/2012    Procedure:COLONOSCOPY; Colonoscopy  ; Surgeon:BOB TY; Location:[facility]     COLONOSCOPY N/A 5/6/2016    Procedure: COLONOSCOPY;  Surgeon: Markus Grossman MD;  Location: [facility]     ESOPHAGOSCOPY, GASTROSCOPY, DUODENOSCOPY (EGD), COMBINED N/A 5/6/2016    Procedure: COMBINED ESOPHAGOSCOPY, GASTROSCOPY, DUODENOSCOPY (EGD);  Surgeon: Markus Grossman MD;  Location: [facility]     LASER SURGERY OF EYE  8/21/2008    Both eyes     SURGICAL HISTORY OF -       T&A     SURGICAL HISTORY OF -       Csection     SURGICAL HISTORY OF -       breast augmentation        Family History   Problem Relation Age of Onset     Alcohol/Drug Mother      Cancer Mother         skin, COD: lung, liver, bone cancer     Melanoma Mother      Diabetes Mother         Post transplant onset     Alcohol/Drug Father      Hypertension Father      Cancer Maternal Grandmother         lung/liver     Asthma Maternal Grandmother      Cancer Maternal Grandfather         skin     Cancer - colorectal Maternal Grandfather      Melanoma Maternal Grandfather      C.A.D. No family hx of       Cerebrovascular Disease No family hx of      Breast Cancer No family hx of      Prostate Cancer No family hx of        Social History     Socioeconomic History     Marital status:      Spouse name: Not on file     Number of children: Not on file     Years of education: Not on file     Highest education level: Not on file   Occupational History     Employer: SELF   Tobacco Use     Smoking status: Never Smoker     Smokeless tobacco: Never Used   Substance and Sexual Activity     Alcohol use: Yes     Comment: 1-2 drinks per week     Drug use: No     Sexual activity: Yes     Partners: Male     Birth control/protection: Surgical     Comment: vas   Other Topics Concern     Parent/sibling w/ CABG, MI or angioplasty before 65F 55M? 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      Surrogate for twins     Social Determinants of Health     Financial Resource Strain:      Difficulty of Paying Living Expenses:    Food Insecurity:      Worried About Running Out of Food in the Last Year:      Ran Out of Food in the Last Year:    Transportation Needs:      Lack of Transportation (Medical):      Lack of Transportation (Non-Medical):    Physical Activity:      Days of Exercise per Week:      Minutes of Exercise per Session:    Stress:      Feeling of Stress :    Social Connections:      Frequency of Communication with Friends and Family:      Frequency of Social Gatherings with Friends and Family:      Attends [employer] Services:      Active Member of Clubs or Organizations:      Attends Club or Organization Meetings:      Marital Status:    Intimate Partner Violence:      Fear of Current or Ex-Partner:      Emotionally Abused:      Physically Abused:      Sexually Abused:        Outpatient Encounter Medications as of 9/2/2021   Medication Sig Dispense Refill     ferrous sulfate (IRON) 325 (65 FE) MG tablet Take 1 tablet (325 mg) by mouth 2 times daily 180 tablet 3     fexofenadine  (ALLEGRA) 180 MG tablet Take 1 tablet (180 mg) by mouth daily 30 tablet 0     fluocinonide (LIDEX) 0.05 % external solution Apply twice daily as needed to frontal scalp. 60 mL 2     ibuprofen (ADVIL) 200 MG capsule Take 200 mg by mouth every 4 hours as needed.       Oxymetazoline HCl (NASAL SPRAY NA) Nasoquort 1 spray in each nostril daily when needed       SUMAtriptan (IMITREX) 25 MG tablet Take 1-2 tablets (25-50 mg) by mouth at onset of headache for migraine May repeat dose in 2 hours.  Do not exceed 200 mg in 24 hours 18 tablet 1     UNKNOWN TO PATIENT Allergy Eye Drops, 1 drop in each eye daily PRN       No facility-administered encounter medications on file as of 9/2/2021.             O:   NAD, WDWN, Alert & Oriented, Mood & Affect wnl, Vitals stable   Here today alone   /74 (BP Location: Right arm, Patient Position: Sitting, Cuff Size: Adult Regular)   Pulse 50   SpO2 100%    General appearance normal   Vitals stable   Alert, oriented and in no acute distress     Mild erythematous scaly perifollicular papules on frontal scalp  Stuck on papules and brown macules on trunk and ext   Red papules on trunk  Brown papules and macules with regular pigment network and borders on torso and extremities      The remainder of skin exam is normal       Eyes: Conjunctivae/lids:Normal     ENT: Lips, buccal mucosa, tongue: normal    MSK:Normal    Cardiovascular: peripheral edema none    Pulm: Breathing Normal    Lymph Nodes: No Head and Neck Lymphadenopathy     Neuro/Psych: Orientation:Alert and Orientedx3 ; Mood/Affect:normal   A/P:  1. FFA stable   IL TAC: PGACAC discussed.  Risks including but not limited to injection site reaction, bruising, no resolution.  All questions answered and entertained to patient s satisfaction.  Informed consent obtained.  IL TAC in concentration of 10 mg/ml was injected ID to frontal fibrosing alopecia.  Total injected was  0.3 ml.  Patient tolerated without complications and given  wound care instructions, including not to move product around.  Return in 4 weeks for follow-up and possible additional IL TAC.    2.  Seborrheic keratosis, lentigo, angioma, benign nevi, History of BCC  BENIGN LESIONS DISCUSSED WITH PATIENT:  I discussed the specifics of tumor, prognosis, and genetics of benign lesions.  I explained that treatment of these lesions would be purely cosmetic and not medically neccessary.  I discussed with patient different removal options including excision, cautery and /or laser.      Nature and genetics of benign skin lesions dicussed with patient.  Signs and Symptoms of skin cancer discussed with patient.  ABCDEs of melanoma reviewed with patient.  Patient encouraged to perform monthly skin exams.  UV precautions reviewed with patient.  Risks of non-melanoma skin cancer discussed with patient   Return to clinic in 6 months.         Again, thank you for allowing me to participate in the care of your patient.        Sincerely,        Talisha Aguilar PA-C

## 2021-09-02 NOTE — NURSING NOTE
Chief Complaint   Patient presents with     Derm Problem     skin check and follow up frontal fibrosing alopecia      Jayna Vallecillo on 9/2/2021 at 11:32 AM

## 2021-09-18 ENCOUNTER — HEALTH MAINTENANCE LETTER (OUTPATIENT)
Age: 51
End: 2021-09-18

## 2021-11-05 ENCOUNTER — MYC MEDICAL ADVICE (OUTPATIENT)
Dept: FAMILY MEDICINE | Facility: CLINIC | Age: 51
End: 2021-11-05

## 2021-11-05 DIAGNOSIS — R06.83 SNORING: Primary | ICD-10-CM

## 2021-11-05 NOTE — TELEPHONE ENCOUNTER
Forwarding [employer] request for sleep referral to PCP.  Order pended for consideration.     Sofie Fowler RN  [facility]     

## 2021-11-30 ENCOUNTER — HOSPITAL ENCOUNTER (OUTPATIENT)
Dept: MAMMOGRAPHY | Facility: CLINIC | Age: 51
Discharge: HOME | End: 2021-11-30
Attending: NURSE PRACTITIONER | Admitting: NURSE PRACTITIONER
Payer: COMMERCIAL

## 2021-11-30 DIAGNOSIS — Z12.31 VISIT FOR SCREENING MAMMOGRAM: ICD-10-CM

## 2021-11-30 PROCEDURE — 77067 SCR MAMMO BI INCL CAD: CPT

## 2022-01-02 ENCOUNTER — HEALTH MAINTENANCE LETTER (OUTPATIENT)
Age: 52
End: 2022-01-02

## 2022-03-31 ENCOUNTER — OFFICE VISIT (OUTPATIENT)
Dept: DERMATOLOGY | Facility: CLINIC | Age: 52
End: 2022-03-31
Payer: COMMERCIAL

## 2022-03-31 VITALS — HEART RATE: 68 BPM | OXYGEN SATURATION: 100 % | SYSTOLIC BLOOD PRESSURE: 133 MMHG | DIASTOLIC BLOOD PRESSURE: 79 MMHG

## 2022-03-31 DIAGNOSIS — D18.01 CHERRY ANGIOMA: ICD-10-CM

## 2022-03-31 DIAGNOSIS — L82.0 INFLAMED SEBORRHEIC KERATOSIS: ICD-10-CM

## 2022-03-31 DIAGNOSIS — L81.4 LENTIGO: Primary | ICD-10-CM

## 2022-03-31 DIAGNOSIS — L66.12 FRONTAL FIBROSING ALOPECIA: ICD-10-CM

## 2022-03-31 DIAGNOSIS — L82.1 SEBORRHEIC KERATOSIS: ICD-10-CM

## 2022-03-31 DIAGNOSIS — Z85.828 HISTORY OF BASAL CELL CARCINOMA: ICD-10-CM

## 2022-03-31 DIAGNOSIS — D22.9 MULTIPLE BENIGN NEVI: ICD-10-CM

## 2022-03-31 PROCEDURE — 99213 OFFICE O/P EST LOW 20 MIN: CPT | Mod: 25 | Performed by: PHYSICIAN ASSISTANT

## 2022-03-31 PROCEDURE — 11900 INJECT SKIN LESIONS </W 7: CPT | Mod: 59 | Performed by: PHYSICIAN ASSISTANT

## 2022-03-31 PROCEDURE — 17110 DESTRUCTION B9 LES UP TO 14: CPT | Performed by: PHYSICIAN ASSISTANT

## 2022-03-31 NOTE — NURSING NOTE
"Initial /79   Pulse 68   SpO2 100%  Estimated body mass index is 22.81 kg/m  as calculated from the following:    Height as of 8/13/21: 1.727 m (5' 8\").    Weight as of 8/13/21: 68 kg (150 lb). .      "

## 2022-03-31 NOTE — LETTER
3/31/2022         RE: Kalina Schreiber  [address] [facility] [address]        Dear Colleague,    Thank you for referring your patient, Kalina Schreiber, to the [facility]. Please see a copy of my visit note below.    Kalina Schreiber is an extremely pleasant 51 year old year old female patient here today for brown scaly spots. She notes that they are irritated. No pain or bleeding. She has history of FFA, uses lidex. She notes hair loss is stable.  Patient has no other skin complaints today.  Remainder of the HPI, Meds, PMH, Allergies, FH, and SH was reviewed in chart.    Pertinent Hx:   History of BCC  Past Medical History:   Diagnosis Date     Allergic rhinitis due to other allergen      Basal cell carcinoma      Solitary cyst of breast      Twin pregnancy, delivered        Past Surgical History:   Procedure Laterality Date     COLONOSCOPY  2/23/2012    Procedure:COLONOSCOPY; Colonoscopy  ; Surgeon:BOB TY; Location:[facility]     COLONOSCOPY N/A 5/6/2016    Procedure: COLONOSCOPY;  Surgeon: Markus Grossman MD;  Location: [facility]     ESOPHAGOSCOPY, GASTROSCOPY, DUODENOSCOPY (EGD), COMBINED N/A 5/6/2016    Procedure: COMBINED ESOPHAGOSCOPY, GASTROSCOPY, DUODENOSCOPY (EGD);  Surgeon: Markus Grossman MD;  Location: [facility]     LASER SURGERY OF EYE  8/21/2008    Both eyes     SURGICAL HISTORY OF -       T&A     SURGICAL HISTORY OF -       Csection     SURGICAL HISTORY OF -       breast augmentation        Family History   Problem Relation Age of Onset     Alcohol/Drug Mother      Cancer Mother         skin, COD: lung, liver, bone cancer     Melanoma Mother      Diabetes Mother         Post transplant onset     Alcohol/Drug Father      Hypertension Father      Cancer Maternal Grandmother         lung/liver     Asthma Maternal Grandmother      Cancer Maternal Grandfather         skin     Cancer - colorectal Maternal Grandfather      Melanoma Maternal Grandfather       C.A.D. No family hx of      Cerebrovascular Disease No family hx of      Breast Cancer No family hx of      Prostate Cancer No family hx of        Social History     Socioeconomic History     Marital status:      Spouse name: Not on file     Number of children: Not on file     Years of education: Not on file     Highest education level: Not on file   Occupational History     Employer: SELF   Tobacco Use     Smoking status: Never Smoker     Smokeless tobacco: Never Used   Substance and Sexual Activity     Alcohol use: Yes     Comment: 1-2 drinks per week     Drug use: No     Sexual activity: Yes     Partners: Male     Birth control/protection: Surgical     Comment: vas   Other Topics Concern     Parent/sibling w/ CABG, MI or angioplasty before 65F 55M? 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      Surrogate for twins     Social Determinants of Health     Financial Resource Strain: Not on file   Food Insecurity: Not on file   Transportation Needs: Not on file   Physical Activity: Not on file   Stress: Not on file   Social Connections: Not on file   Intimate Partner Violence: Not on file   Housing Stability: Not on file       Outpatient Encounter Medications as of 3/31/2022   Medication Sig Dispense Refill     ferrous sulfate (IRON) 325 (65 FE) MG tablet Take 1 tablet (325 mg) by mouth 2 times daily 180 tablet 3     fexofenadine (ALLEGRA) 180 MG tablet Take 1 tablet (180 mg) by mouth daily 30 tablet 0     fluocinonide (LIDEX) 0.05 % external solution Apply twice daily as needed to frontal scalp. 60 mL 9     ibuprofen (ADVIL) 200 MG capsule Take 200 mg by mouth every 4 hours as needed.       Oxymetazoline HCl (NASAL SPRAY NA) Nasoquort 1 spray in each nostril daily when needed       SUMAtriptan (IMITREX) 25 MG tablet Take 1-2 tablets (25-50 mg) by mouth at onset of headache for migraine May repeat dose in 2 hours.  Do not exceed 200 mg in 24 hours 18 tablet 1     UNKNOWN  TO PATIENT Allergy Eye Drops, 1 drop in each eye daily PRN       No facility-administered encounter medications on file as of 3/31/2022.             O:   NAD, WDWN, Alert & Oriented, Mood & Affect wnl, Vitals stable   Here today alone   /79   Pulse 68   SpO2 100%    General appearance normal   Vitals stable   Alert, oriented and in no acute distress    Mild erythematous scaly perifollicular papules on frontal scalp  Stuck on papules and brown macules on trunk and ext   Red papules on trunk  Brown papules and macules with regular pigment network and borders on torso and extremities       Eyes: Conjunctivae/lids:Normal     ENT: Lips: normal    MSK:Normal    Cardiovascular: peripheral edema none    Pulm: Breathing Normal    Neuro/Psych: Orientation:Alert and Orientedx3 ; Mood/Affect:normal   A/P:  1. Inflamed seborrheic keratosis on right breast, right calf, left posterior shoulder x 3  LN2:  Treated with LN2 for 5s for 1-2 cycles. Warned risks of blistering, pain, pigment change, scarring, and incomplete resolution.  Advised patient to return if lesions do not completely resolve.  Wound care sheet given.  2. Frontal fibrosing alopecia   IL TAC: PGACAC discussed.  Risks including but not limited to injection site reaction, bruising, no resolution.  All questions answered and entertained to patient s satisfaction.  Informed consent obtained.  IL TAC in concentration of 5 mg/ml was injected ID to frontal fibrosing alopecia.  Total injected was  1.0 ml.  Patient tolerated without complications and given wound care instructions, including not to move product around.  Return in 4 weeks for follow-up and possible additional IL TAC.    3. Seborrheic keratosis, lentigo, angioma, benign nevi, history of BCC  BENIGN LESIONS DISCUSSED WITH PATIENT:  I discussed the specifics of tumor, prognosis, and genetics of benign lesions.  I explained that treatment of these lesions would be purely cosmetic and not medically  neccessary.  I discussed with patient different removal options including excision, cautery and /or laser.      Nature and genetics of benign skin lesions dicussed with patient.  Signs and Symptoms of skin cancer discussed with patient.  ABCDEs of melanoma reviewed with patient.  Patient encouraged to perform monthly skin exams.  UV precautions reviewed with patient.  Risks of non-melanoma skin cancer discussed with patient   Return to clinic in 6 months.       Again, thank you for allowing me to participate in the care of your patient.        Sincerely,        Talisha Aguilar PA-C

## 2022-04-01 NOTE — PROGRESS NOTES
Kalina Schreiber is an extremely pleasant 51 year old year old female patient here today for brown scaly spots. She notes that they are irritated. No pain or bleeding. She has history of FFA, uses lidex. She notes hair loss is stable.  Patient has no other skin complaints today.  Remainder of the HPI, Meds, PMH, Allergies, FH, and SH was reviewed in chart.    Pertinent Hx:   History of BCC  Past Medical History:   Diagnosis Date     Allergic rhinitis due to other allergen      Basal cell carcinoma      Solitary cyst of breast      Twin pregnancy, delivered        Past Surgical History:   Procedure Laterality Date     COLONOSCOPY  2/23/2012    Procedure:COLONOSCOPY; Colonoscopy  ; Surgeon:BOB TY; Location:[facility]     COLONOSCOPY N/A 5/6/2016    Procedure: COLONOSCOPY;  Surgeon: Markus Grossman MD;  Location: [facility]     ESOPHAGOSCOPY, GASTROSCOPY, DUODENOSCOPY (EGD), COMBINED N/A 5/6/2016    Procedure: COMBINED ESOPHAGOSCOPY, GASTROSCOPY, DUODENOSCOPY (EGD);  Surgeon: Markus Grossman MD;  Location: [facility]     LASER SURGERY OF EYE  8/21/2008    Both eyes     SURGICAL HISTORY OF -       T&A     SURGICAL HISTORY OF -       Csection     SURGICAL HISTORY OF -       breast augmentation        Family History   Problem Relation Age of Onset     Alcohol/Drug Mother      Cancer Mother         skin, COD: lung, liver, bone cancer     Melanoma Mother      Diabetes Mother         Post transplant onset     Alcohol/Drug Father      Hypertension Father      Cancer Maternal Grandmother         lung/liver     Asthma Maternal Grandmother      Cancer Maternal Grandfather         skin     Cancer - colorectal Maternal Grandfather      Melanoma Maternal Grandfather      C.A.D. No family hx of      Cerebrovascular Disease No family hx of      Breast Cancer No family hx of      Prostate Cancer No family hx of        Social History     Socioeconomic History     Marital status:      Spouse name: Not on file     Number  of children: Not on file     Years of education: Not on file     Highest education level: Not on file   Occupational History     Employer: SELF   Tobacco Use     Smoking status: Never Smoker     Smokeless tobacco: Never Used   Substance and Sexual Activity     Alcohol use: Yes     Comment: 1-2 drinks per week     Drug use: No     Sexual activity: Yes     Partners: Male     Birth control/protection: Surgical     Comment: vas   Other Topics Concern     Parent/sibling w/ CABG, MI or angioplasty before 65F 55M? 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      Surrogate for twins     Social Determinants of Health     Financial Resource Strain: Not on file   Food Insecurity: Not on file   Transportation Needs: Not on file   Physical Activity: Not on file   Stress: Not on file   Social Connections: Not on file   Intimate Partner Violence: Not on file   Housing Stability: Not on file       Outpatient Encounter Medications as of 3/31/2022   Medication Sig Dispense Refill     ferrous sulfate (IRON) 325 (65 FE) MG tablet Take 1 tablet (325 mg) by mouth 2 times daily 180 tablet 3     fexofenadine (ALLEGRA) 180 MG tablet Take 1 tablet (180 mg) by mouth daily 30 tablet 0     fluocinonide (LIDEX) 0.05 % external solution Apply twice daily as needed to frontal scalp. 60 mL 9     ibuprofen (ADVIL) 200 MG capsule Take 200 mg by mouth every 4 hours as needed.       Oxymetazoline HCl (NASAL SPRAY NA) Nasoquort 1 spray in each nostril daily when needed       SUMAtriptan (IMITREX) 25 MG tablet Take 1-2 tablets (25-50 mg) by mouth at onset of headache for migraine May repeat dose in 2 hours.  Do not exceed 200 mg in 24 hours 18 tablet 1     UNKNOWN TO PATIENT Allergy Eye Drops, 1 drop in each eye daily PRN       No facility-administered encounter medications on file as of 3/31/2022.             O:   NAD, WDWN, Alert & Oriented, Mood & Affect wnl, Vitals stable   Here today alone   /79   Pulse  68   SpO2 100%    General appearance normal   Vitals stable   Alert, oriented and in no acute distress    Mild erythematous scaly perifollicular papules on frontal scalp  Stuck on papules and brown macules on trunk and ext   Red papules on trunk  Brown papules and macules with regular pigment network and borders on torso and extremities       Eyes: Conjunctivae/lids:Normal     ENT: Lips: normal    MSK:Normal    Cardiovascular: peripheral edema none    Pulm: Breathing Normal    Neuro/Psych: Orientation:Alert and Orientedx3 ; Mood/Affect:normal   A/P:  1. Inflamed seborrheic keratosis on right breast, right calf, left posterior shoulder x 3  LN2:  Treated with LN2 for 5s for 1-2 cycles. Warned risks of blistering, pain, pigment change, scarring, and incomplete resolution.  Advised patient to return if lesions do not completely resolve.  Wound care sheet given.  2. Frontal fibrosing alopecia   IL TAC: PGACAC discussed.  Risks including but not limited to injection site reaction, bruising, no resolution.  All questions answered and entertained to patient s satisfaction.  Informed consent obtained.  IL TAC in concentration of 5 mg/ml was injected ID to frontal fibrosing alopecia.  Total injected was  1.0 ml.  Patient tolerated without complications and given wound care instructions, including not to move product around.  Return in 4 weeks for follow-up and possible additional IL TAC.    3. Seborrheic keratosis, lentigo, angioma, benign nevi, history of BCC  BENIGN LESIONS DISCUSSED WITH PATIENT:  I discussed the specifics of tumor, prognosis, and genetics of benign lesions.  I explained that treatment of these lesions would be purely cosmetic and not medically neccessary.  I discussed with patient different removal options including excision, cautery and /or laser.      Nature and genetics of benign skin lesions dicussed with patient.  Signs and Symptoms of skin cancer discussed with patient.  ABCDEs of melanoma  reviewed with patient.  Patient encouraged to perform monthly skin exams.  UV precautions reviewed with patient.  Risks of non-melanoma skin cancer discussed with patient   Return to clinic in 6 months.

## 2022-08-01 ENCOUNTER — MYC MEDICAL ADVICE (OUTPATIENT)
Dept: FAMILY MEDICINE | Facility: CLINIC | Age: 52
End: 2022-08-01

## 2022-08-01 ENCOUNTER — TELEPHONE (OUTPATIENT)
Dept: FAMILY MEDICINE | Facility: CLINIC | Age: 52
End: 2022-08-01

## 2022-08-01 NOTE — TELEPHONE ENCOUNTER
Reason for call:    Symptom or request:     Patient called stating stating that she is taking paxlovid-- due to metal taste in her mouth.     Diagnosed with covid 07/30-- prescribed paxlovid in [facility].     She is asking what she can do.         Best Time:  any    Can we leave a detailed message on this number?  YES     Kary CELESTE  Station

## 2022-08-02 NOTE — TELEPHONE ENCOUNTER
See Parisa message, writer called patient to follow up. Patient says that her side effects from Paxlovid were a bad taste in her mouth, nausea, headache, and her teeth hurt. Denies any s/s of anaphylactic reaction. Says that she feels much better since stopping the medication, and reports that she's not currently experiencing any symptoms r/t Covid. Routing to covering provider for review, and any further recommendations.    Francine Henderson RN  [facility]

## 2022-08-02 NOTE — TELEPHONE ENCOUNTER
Patient should stop the paxlovid. If not having any further covid symptoms no further treatment is needed at this time other than conservative cares. If COVID symptoms return should be re-evaluated.

## 2022-08-02 NOTE — TELEPHONE ENCOUNTER
Pt was notified of provider's message, and writer also reviewed isolation guidelines with her per CDC recommendations; she verbalized understanding.    Francine Henderson RN  [facility]

## 2022-08-04 NOTE — TELEPHONE ENCOUNTER
The patient reports this was addressed and she has no further questions.    Thank you    Karen CARSON RN

## 2022-09-29 ENCOUNTER — OFFICE VISIT (OUTPATIENT)
Dept: DERMATOLOGY | Facility: CLINIC | Age: 52
End: 2022-09-29
Payer: COMMERCIAL

## 2022-09-29 DIAGNOSIS — L81.4 LENTIGO: ICD-10-CM

## 2022-09-29 DIAGNOSIS — D18.01 CHERRY ANGIOMA: ICD-10-CM

## 2022-09-29 DIAGNOSIS — D22.9 MULTIPLE BENIGN NEVI: ICD-10-CM

## 2022-09-29 DIAGNOSIS — L82.1 SEBORRHEIC KERATOSIS: ICD-10-CM

## 2022-09-29 DIAGNOSIS — Z85.828 HISTORY OF BASAL CELL CARCINOMA: ICD-10-CM

## 2022-09-29 DIAGNOSIS — L66.12 FRONTAL FIBROSING ALOPECIA: Primary | ICD-10-CM

## 2022-09-29 DIAGNOSIS — D48.5 NEOPLASM OF UNCERTAIN BEHAVIOR OF SKIN: ICD-10-CM

## 2022-09-29 PROCEDURE — 11900 INJECT SKIN LESIONS </W 7: CPT | Mod: 51 | Performed by: PHYSICIAN ASSISTANT

## 2022-09-29 PROCEDURE — 88305 TISSUE EXAM BY PATHOLOGIST: CPT | Performed by: DERMATOLOGY

## 2022-09-29 PROCEDURE — 11102 TANGNTL BX SKIN SINGLE LES: CPT | Performed by: PHYSICIAN ASSISTANT

## 2022-09-29 PROCEDURE — 99213 OFFICE O/P EST LOW 20 MIN: CPT | Mod: 25 | Performed by: PHYSICIAN ASSISTANT

## 2022-09-29 ASSESSMENT — PAIN SCALES - GENERAL: PAINLEVEL: NO PAIN (0)

## 2022-09-29 NOTE — PATIENT INSTRUCTIONS
Wound Care Instructions     FOR SUPERFICIAL WOUNDS     [facility] [phone]  [facility] [phone]    Left Temporal Scalp                 AFTER 24 HOURS YOU SHOULD REMOVE THE BANDAGE AND BEGIN DAILY DRESSING CHANGES AS FOLLOWS:     1) Remove Dressing.     2) Clean and dry the area with tap water using a Q-tip or sterile gauze pad.     3) Apply Vaseline, Aquaphor, Polysporin ointment or Bacitracin ointment over entire wound.  Do NOT use Neosporin ointment.     4) Cover the wound with a band-aid, or a sterile non-stick gauze pad and micropore paper tape      REPEAT THESE INSTRUCTIONS AT LEAST ONCE A DAY UNTIL THE WOUND HAS COMPLETELY HEALED.    It is an old wives tale that a wound heals better when it is exposed to air and allowed to dry out. The wound will heal faster with a better cosmetic result if it is kept moist with ointment and covered with a bandage.    **Do not let the wound dry out.**      Supplies Needed:      *Cotton tipped applicators (Q-tips)    *Polysporin Ointment or Bacitracin Ointment (NOT NEOSPORIN)    *Band-aids or non-stick gauze pads and micropore paper tape.      PATIENT INFORMATION:    During the healing process you will notice a number of changes. All wounds develop a small halo of redness surrounding the wound.  This means healing is occurring. Severe itching with extensive redness usually indicates sensitivity to the ointment or bandage tape used to dress the wound.  You should call our office if this develops.      Swelling  and/or discoloration around your surgical site is common, particularly when performed around the eye.    All wounds normally drain.  The larger the wound the more drainage there will be.  After 7-10 days, you will notice the wound beginning to shrink and new skin will begin to grow.  The wound is healed when you can see skin has formed over the entire area.  A healed wound has a healthy, shiny look to the surface and is red to dark  pink in color to normalize.  Wounds may take approximately 4-6 weeks to heal.  Larger wounds may take 6-8 weeks.  After the wound is healed you may discontinue dressing changes.    You may experience a sensation of tightness as your wound heals. This is normal and will gradually subside.    Your healed wound may be sensitive to temperature changes. This sensitivity improves with time, but if you re having a lot of discomfort, try to avoid temperature extremes.    Patients frequently experience itching after their wound appears to have healed because of the continue healing under the skin.  Plain Vaseline will help relieve the itching.    POSSIBLE COMPLICATIONS    BLEEDING:    Leave the bandage in place.  Use tightly rolled up gauze or a cloth to apply direct pressure over the bandage for 30  minutes.  Reapply pressure for an additional 30 minutes if necessary  Use additional gauze and tape to maintain pressure once the bleeding has stopped.

## 2022-09-29 NOTE — NURSING NOTE
Chief Complaint   Patient presents with     Skin Check     Right ear        There were no vitals filed for this visit.  Wt Readings from Last 1 Encounters:   08/13/21 68 kg (150 lb)       Ivania Montano LPN .................9/29/2022

## 2022-09-29 NOTE — PROGRESS NOTES
Kalina Schreiber is an extremely pleasant 51 year old year old female patient here today for skin check. Notes scaly area on bump. No painful or bleeding. Present for a few months. She notes FFA, ok will get inflamed occasionally. She uses topical steroids as needed.  Patient has no other skin complaints today.  Remainder of the HPI, Meds, PMH, Allergies, FH, and SH was reviewed in chart.    Pertinent Hx:   History of BCC  Past Medical History:   Diagnosis Date     Allergic rhinitis due to other allergen      Basal cell carcinoma      Solitary cyst of breast      Twin pregnancy, delivered        Past Surgical History:   Procedure Laterality Date     COLONOSCOPY  2/23/2012    Procedure:COLONOSCOPY; Colonoscopy  ; Surgeon:BOB TY; Location:[facility]     COLONOSCOPY N/A 5/6/2016    Procedure: COLONOSCOPY;  Surgeon: Markus Grossman MD;  Location: [facility]     ESOPHAGOSCOPY, GASTROSCOPY, DUODENOSCOPY (EGD), COMBINED N/A 5/6/2016    Procedure: COMBINED ESOPHAGOSCOPY, GASTROSCOPY, DUODENOSCOPY (EGD);  Surgeon: Markus Grossman MD;  Location: [facility]     LASER SURGERY OF EYE  8/21/2008    Both eyes     SURGICAL HISTORY OF -       T&A     SURGICAL HISTORY OF -       Csection     SURGICAL HISTORY OF -       breast augmentation        Family History   Problem Relation Age of Onset     Alcohol/Drug Mother      Cancer Mother         skin, COD: lung, liver, bone cancer     Melanoma Mother      Diabetes Mother         Post transplant onset     Alcohol/Drug Father      Hypertension Father      Cancer Maternal Grandmother         lung/liver     Asthma Maternal Grandmother      Cancer Maternal Grandfather         skin     Cancer - colorectal Maternal Grandfather      Melanoma Maternal Grandfather      C.A.D. No family hx of      Cerebrovascular Disease No family hx of      Breast Cancer No family hx of      Prostate Cancer No family hx of        Social History     Socioeconomic History     Marital status:       Spouse name: Not on file     Number of children: Not on file     Years of education: Not on file     Highest education level: Not on file   Occupational History     Employer: SELF   Tobacco Use     Smoking status: Never Smoker     Smokeless tobacco: Never Used   Substance and Sexual Activity     Alcohol use: Yes     Comment: 1-2 drinks per week     Drug use: No     Sexual activity: Yes     Partners: Male     Birth control/protection: Surgical     Comment: vas   Other Topics Concern     Parent/sibling w/ CABG, MI or angioplasty before 65F 55M? 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      Surrogate for twins     Social Determinants of Health     Financial Resource Strain: Not on file   Food Insecurity: Not on file   Transportation Needs: Not on file   Physical Activity: Not on file   Stress: Not on file   Social Connections: Not on file   Intimate Partner Violence: Not on file   Housing Stability: Not on file       Outpatient Encounter Medications as of 9/29/2022   Medication Sig Dispense Refill     ferrous sulfate (IRON) 325 (65 FE) MG tablet Take 1 tablet (325 mg) by mouth 2 times daily 180 tablet 3     fexofenadine (ALLEGRA) 180 MG tablet Take 1 tablet (180 mg) by mouth daily 30 tablet 0     fluocinonide (LIDEX) 0.05 % external solution Apply twice daily as needed to frontal scalp. 60 mL 9     ibuprofen (ADVIL/MOTRIN) 200 MG capsule Take 200 mg by mouth every 4 hours as needed.       Oxymetazoline HCl (NASAL SPRAY NA) Nasoquort 1 spray in each nostril daily when needed       SUMAtriptan (IMITREX) 25 MG tablet Take 1-2 tablets (25-50 mg) by mouth at onset of headache for migraine May repeat dose in 2 hours.  Do not exceed 200 mg in 24 hours 18 tablet 1     UNKNOWN TO PATIENT Allergy Eye Drops, 1 drop in each eye daily PRN       No facility-administered encounter medications on file as of 9/29/2022.             O:   NAD, WDWN, Alert & Oriented, Mood & Affect wnl, Vitals  stable   Here today alone   There were no vitals taken for this visit.   General appearance normal   Vitals stable   Alert, oriented and in no acute distress     0.3 cm pink pearly papule on left superior temporal scalp  Mild erythematous scaly perifollicular papules on frontal scalp   Stuck on papules and brown macules on trunk and ext   Red papules on trunk  Brown papules and macules with regular pigment network and borders on torso and extremities     The remainder of skin exam is normal      Eyes: Conjunctivae/lids:Normal     ENT: Lips: normal    MSK:Normal    Cardiovascular: peripheral edema none    Pulm: Breathing Normal    Neuro/Psych: Orientation:Alert and Orientedx3 ; Mood/Affect:normal   A/P:  1. R/O BCC on left superior temporal scalp  TANGENTIAL BIOPSY SENT OUT:  After consent, anesthesia with LEC and prep, tangential excision performed and specimen sent out for permanent section histology.  No complications and routine wound care. Patient told to call our office in 1-2 weeks for result.     2. Frontal fibrosing alopecia   IL TAC: PGACAC discussed.  Risks including but not limited to injection site reaction, bruising, no resolution.  All questions answered and entertained to patient s satisfaction.  Informed consent obtained.  IL TAC in concentration of 5 mg/ml was injected ID to frontal fibrosing alopecia.  Total injected was  1.0 ml.  Patient tolerated without complications and given wound care instructions, including not to move product around.  Return in 4 weeks for follow-up and possible additional IL TAC.    3. Seborrheic keratosis, lentigo, angioma, benign nevi, history of BCC  BENIGN LESIONS DISCUSSED WITH PATIENT:  I discussed the specifics of tumor, prognosis, and genetics of benign lesions.  I explained that treatment of these lesions would be purely cosmetic and not medically neccessary.  I discussed with patient different removal options including excision, cautery and /or laser.      Nature  and genetics of benign skin lesions dicussed with patient.  Signs and Symptoms of skin cancer discussed with patient.  ABCDEs of melanoma reviewed with patient.  Patient encouraged to perform monthly skin exams.  UV precautions reviewed with patient.  Risks of non-melanoma skin cancer discussed with patient   Return to clinic pending biopsy results.

## 2022-09-29 NOTE — LETTER
9/29/2022         RE: Kalina Schreiber  [id number] [facility] [address]        Dear Colleague,    Thank you for referring your patient, Kalina Schreiber, to the [facility]. Please see a copy of my visit note below.    Kalina Schreiber is an extremely pleasant 51 year old year old female patient here today for skin check. Notes scaly area on bump. No painful or bleeding. Present for a few months. She notes FFA, ok will get inflamed occasionally. She uses topical steroids as needed.  Patient has no other skin complaints today.  Remainder of the HPI, Meds, PMH, Allergies, FH, and SH was reviewed in chart.    Pertinent Hx:   History of BCC  Past Medical History:   Diagnosis Date     Allergic rhinitis due to other allergen      Basal cell carcinoma      Solitary cyst of breast      Twin pregnancy, delivered        Past Surgical History:   Procedure Laterality Date     COLONOSCOPY  2/23/2012    Procedure:COLONOSCOPY; Colonoscopy  ; Surgeon:BOB TY; Location:[facility]     COLONOSCOPY N/A 5/6/2016    Procedure: COLONOSCOPY;  Surgeon: Markus Grossman MD;  Location: [facility]     ESOPHAGOSCOPY, GASTROSCOPY, DUODENOSCOPY (EGD), COMBINED N/A 5/6/2016    Procedure: COMBINED ESOPHAGOSCOPY, GASTROSCOPY, DUODENOSCOPY (EGD);  Surgeon: Markus Grossman MD;  Location: [facility]     LASER SURGERY OF EYE  8/21/2008    Both eyes     SURGICAL HISTORY OF -       T&A     SURGICAL HISTORY OF -       Csection     SURGICAL HISTORY OF -       breast augmentation        Family History   Problem Relation Age of Onset     Alcohol/Drug Mother      Cancer Mother         skin, COD: lung, liver, bone cancer     Melanoma Mother      Diabetes Mother         Post transplant onset     Alcohol/Drug Father      Hypertension Father      Cancer Maternal Grandmother         lung/liver     Asthma Maternal Grandmother      Cancer Maternal Grandfather         skin     Cancer - colorectal Maternal Grandfather       Melanoma Maternal Grandfather      NANDINI.A.D. No family hx of      Cerebrovascular Disease No family hx of      Breast Cancer No family hx of      Prostate Cancer No family hx of        Social History     Socioeconomic History     Marital status:      Spouse name: Not on file     Number of children: Not on file     Years of education: Not on file     Highest education level: Not on file   Occupational History     Employer: SELF   Tobacco Use     Smoking status: Never Smoker     Smokeless tobacco: Never Used   Substance and Sexual Activity     Alcohol use: Yes     Comment: 1-2 drinks per week     Drug use: No     Sexual activity: Yes     Partners: Male     Birth control/protection: Surgical     Comment: vas   Other Topics Concern     Parent/sibling w/ CABG, MI or angioplasty before 65F 55M? 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      Surrogate for twins     Social Determinants of Health     Financial Resource Strain: Not on file   Food Insecurity: Not on file   Transportation Needs: Not on file   Physical Activity: Not on file   Stress: Not on file   Social Connections: Not on file   Intimate Partner Violence: Not on file   Housing Stability: Not on file       Outpatient Encounter Medications as of 9/29/2022   Medication Sig Dispense Refill     ferrous sulfate (IRON) 325 (65 FE) MG tablet Take 1 tablet (325 mg) by mouth 2 times daily 180 tablet 3     fexofenadine (ALLEGRA) 180 MG tablet Take 1 tablet (180 mg) by mouth daily 30 tablet 0     fluocinonide (LIDEX) 0.05 % external solution Apply twice daily as needed to frontal scalp. 60 mL 9     ibuprofen (ADVIL/MOTRIN) 200 MG capsule Take 200 mg by mouth every 4 hours as needed.       Oxymetazoline HCl (NASAL SPRAY NA) Nasoquort 1 spray in each nostril daily when needed       SUMAtriptan (IMITREX) 25 MG tablet Take 1-2 tablets (25-50 mg) by mouth at onset of headache for migraine May repeat dose in 2 hours.  Do not exceed  200 mg in 24 hours 18 tablet 1     UNKNOWN TO PATIENT Allergy Eye Drops, 1 drop in each eye daily PRN       No facility-administered encounter medications on file as of 9/29/2022.             O:   NAD, WDWN, Alert & Oriented, Mood & Affect wnl, Vitals stable   Here today alone   There were no vitals taken for this visit.   General appearance normal   Vitals stable   Alert, oriented and in no acute distress     0.3 cm pink pearly papule on left superior temporal scalp  Mild erythematous scaly perifollicular papules on frontal scalp   Stuck on papules and brown macules on trunk and ext   Red papules on trunk  Brown papules and macules with regular pigment network and borders on torso and extremities     The remainder of skin exam is normal      Eyes: Conjunctivae/lids:Normal     ENT: Lips: normal    MSK:Normal    Cardiovascular: peripheral edema none    Pulm: Breathing Normal    Neuro/Psych: Orientation:Alert and Orientedx3 ; Mood/Affect:normal   A/P:  1. R/O BCC on left superior temporal scalp  TANGENTIAL BIOPSY SENT OUT:  After consent, anesthesia with LEC and prep, tangential excision performed and specimen sent out for permanent section histology.  No complications and routine wound care. Patient told to call our office in 1-2 weeks for result.     2. Frontal fibrosing alopecia   IL TAC: PGACAC discussed.  Risks including but not limited to injection site reaction, bruising, no resolution.  All questions answered and entertained to patient s satisfaction.  Informed consent obtained.  IL TAC in concentration of 5 mg/ml was injected ID to frontal fibrosing alopecia.  Total injected was  1.0 ml.  Patient tolerated without complications and given wound care instructions, including not to move product around.  Return in 4 weeks for follow-up and possible additional IL TAC.    3. Seborrheic keratosis, lentigo, angioma, benign nevi, history of BCC  BENIGN LESIONS DISCUSSED WITH PATIENT:  I discussed the specifics of  tumor, prognosis, and genetics of benign lesions.  I explained that treatment of these lesions would be purely cosmetic and not medically neccessary.  I discussed with patient different removal options including excision, cautery and /or laser.      Nature and genetics of benign skin lesions dicussed with patient.  Signs and Symptoms of skin cancer discussed with patient.  ABCDEs of melanoma reviewed with patient.  Patient encouraged to perform monthly skin exams.  UV precautions reviewed with patient.  Risks of non-melanoma skin cancer discussed with patient   Return to clinic pending biopsy results.         Again, thank you for allowing me to participate in the care of your patient.        Sincerely,        Talisha Aguilar PA-C

## 2022-09-30 LAB
PATH REPORT.COMMENTS IMP SPEC: NORMAL
PATH REPORT.COMMENTS IMP SPEC: NORMAL
PATH REPORT.FINAL DX SPEC: NORMAL
PATH REPORT.GROSS SPEC: NORMAL
PATH REPORT.MICROSCOPIC SPEC OTHER STN: NORMAL
PATH REPORT.RELEVANT HX SPEC: NORMAL

## 2022-10-13 ASSESSMENT — ENCOUNTER SYMPTOMS
ARTHRALGIAS: 0
MYALGIAS: 0
SHORTNESS OF BREATH: 0
FEVER: 0
HEMATURIA: 0
BREAST MASS: 0
HEMATOCHEZIA: 0
FREQUENCY: 0
COUGH: 0
HEARTBURN: 0
CONSTIPATION: 0
EYE PAIN: 0
PARESTHESIAS: 0
NAUSEA: 0
PALPITATIONS: 0
HEADACHES: 0
DIZZINESS: 0
CHILLS: 0
DYSURIA: 0
DIARRHEA: 0
NERVOUS/ANXIOUS: 0
ABDOMINAL PAIN: 0
WEAKNESS: 0
SORE THROAT: 0
JOINT SWELLING: 0

## 2022-10-14 ENCOUNTER — OFFICE VISIT (OUTPATIENT)
Dept: FAMILY MEDICINE | Facility: CLINIC | Age: 52
End: 2022-10-14
Payer: COMMERCIAL

## 2022-10-14 VITALS
WEIGHT: 149 LBS | DIASTOLIC BLOOD PRESSURE: 80 MMHG | HEART RATE: 78 BPM | TEMPERATURE: 98.4 F | SYSTOLIC BLOOD PRESSURE: 124 MMHG | BODY MASS INDEX: 22.58 KG/M2 | RESPIRATION RATE: 16 BRPM | HEIGHT: 68 IN | OXYGEN SATURATION: 99 %

## 2022-10-14 DIAGNOSIS — Z13.220 SCREENING FOR HYPERLIPIDEMIA: ICD-10-CM

## 2022-10-14 DIAGNOSIS — Z01.419 ENCOUNTER FOR GYNECOLOGICAL EXAMINATION WITHOUT ABNORMAL FINDING: Primary | ICD-10-CM

## 2022-10-14 DIAGNOSIS — Z12.4 CERVICAL CANCER SCREENING: ICD-10-CM

## 2022-10-14 DIAGNOSIS — G43.109 MIGRAINE WITH AURA AND WITHOUT STATUS MIGRAINOSUS, NOT INTRACTABLE: ICD-10-CM

## 2022-10-14 DIAGNOSIS — Z11.4 SCREENING FOR HIV (HUMAN IMMUNODEFICIENCY VIRUS): ICD-10-CM

## 2022-10-14 DIAGNOSIS — Z11.59 NEED FOR HEPATITIS C SCREENING TEST: ICD-10-CM

## 2022-10-14 DIAGNOSIS — N94.6 DYSMENORRHEA: ICD-10-CM

## 2022-10-14 LAB
CHOLEST SERPL-MCNC: 195 MG/DL
HDLC SERPL-MCNC: 82 MG/DL
LDLC SERPL CALC-MCNC: 99 MG/DL
NONHDLC SERPL-MCNC: 113 MG/DL
TRIGL SERPL-MCNC: 72 MG/DL

## 2022-10-14 PROCEDURE — 87389 HIV-1 AG W/HIV-1&-2 AB AG IA: CPT | Performed by: NURSE PRACTITIONER

## 2022-10-14 PROCEDURE — 80061 LIPID PANEL: CPT | Performed by: NURSE PRACTITIONER

## 2022-10-14 PROCEDURE — 99213 OFFICE O/P EST LOW 20 MIN: CPT | Mod: 25 | Performed by: NURSE PRACTITIONER

## 2022-10-14 PROCEDURE — 99396 PREV VISIT EST AGE 40-64: CPT | Performed by: NURSE PRACTITIONER

## 2022-10-14 PROCEDURE — 86803 HEPATITIS C AB TEST: CPT | Performed by: NURSE PRACTITIONER

## 2022-10-14 PROCEDURE — 87624 HPV HI-RISK TYP POOLED RSLT: CPT | Performed by: NURSE PRACTITIONER

## 2022-10-14 PROCEDURE — 36415 COLL VENOUS BLD VENIPUNCTURE: CPT | Performed by: NURSE PRACTITIONER

## 2022-10-14 PROCEDURE — G0145 SCR C/V CYTO,THINLAYER,RESCR: HCPCS | Performed by: NURSE PRACTITIONER

## 2022-10-14 RX ORDER — SUMATRIPTAN 25 MG/1
25-50 TABLET, FILM COATED ORAL
Qty: 18 TABLET | Refills: 11 | Status: SHIPPED | OUTPATIENT
Start: 2022-10-14 | End: 2023-11-09

## 2022-10-14 ASSESSMENT — ENCOUNTER SYMPTOMS
WEAKNESS: 0
JOINT SWELLING: 0
HEMATOCHEZIA: 0
CHILLS: 0
NAUSEA: 0
ARTHRALGIAS: 0
FEVER: 0
HEMATURIA: 0
HEARTBURN: 0
CONSTIPATION: 0
NERVOUS/ANXIOUS: 0
ABDOMINAL PAIN: 0
SHORTNESS OF BREATH: 0
DYSURIA: 0
MYALGIAS: 0
EYE PAIN: 0
DIARRHEA: 0
FREQUENCY: 0
DIZZINESS: 0
PALPITATIONS: 0
BREAST MASS: 0
HEADACHES: 0
SORE THROAT: 0
COUGH: 0
PARESTHESIAS: 0

## 2022-10-14 ASSESSMENT — PAIN SCALES - GENERAL: PAINLEVEL: MILD PAIN (2)

## 2022-10-14 NOTE — PROGRESS NOTES
SUBJECTIVE:   CC: Kalina is an 51 year old who presents for preventive health visit.       Patient has been advised of split billing requirements and indicates understanding: Yes  Healthy Habits:     Getting at least 3 servings of Calcium per day:  NO    Bi-annual eye exam:  Yes    Dental care twice a year:  Yes    Sleep apnea or symptoms of sleep apnea:  None    Diet:  Regular (no restrictions)    Frequency of exercise:  4-5 days/week    Duration of exercise:  45-60 minutes    Taking medications regularly:  Yes    Medication side effects:  None    PHQ-2 Total Score: 0    Additional concerns today:  Yes        PROBLEMS TO ADD ON...    Migraine headache follow up  Refill imitrex to have on hand  Hasn't had a headache in a long time.        Should she get another colonoscopy now?  Grandfather with history of colon cancer  Last one was 2016  She has had 3 colonoscopies in the past - has never had a polyp    Dysmenorrhea:  Period every 24 days  Bleeds heavy for 2 days then tapers off.  Will bleed through a tampon in 5 minutes - limits her ability to leave the house  This has been how her periods are for the last 17 years.        Today's PHQ-2 Score:   PHQ-2 ( 1999 Pf[employer]) 10/13/2022   Q1: Little interest or pleasure in doing things 0   Q2: Feeling down, depressed or hopeless 0   PHQ-2 Score 0   Q1: Little interest or pleasure in doing things Not at all   Q2: Feeling down, depressed or hopeless Not at all   PHQ-2 Score 0       Abuse: Current or Past (Physical, Sexual or Emotional) - No  Do you feel safe in your environment? Yes    Have you ever done Advance Care Planning? (For example, a Health Directive, POLST, or a discussion with a medical provider or your loved ones about your wishes): Yes, patient states has an Advance Care Planning document and will bring a copy to the clinic.    Social History     Tobacco Use     Smoking status: Never     Smokeless tobacco: Never   Substance Use Topics     Alcohol use: Yes      Comment: 0-1 drinks per day     If you drink alcohol do you typically have >3 drinks per day or >7 drinks per week? No    Alcohol Use 10/14/2022   Prescreen: >3 drinks/day or >7 drinks/week? -   Prescreen: >3 drinks/day or >7 drinks/week? No       Reviewed orders with patient.  Reviewed health maintenance and updated orders accordingly - Yes      Breast Cancer Screening:    FHS-7:   Breast CA Risk Assessment (FHS-7) 11/30/2021 10/13/2022   Did any of your first-degree relatives have breast or ovarian cancer? No No   Did any of your relatives have bilateral breast cancer? No No   Did any man in your family have breast cancer? No No   Did any woman in your family have breast and ovarian cancer? No No   Did any woman in your family have breast cancer before age 50 y? No No   Do you have 2 or more relatives with breast and/or ovarian cancer? No No   Do you have 2 or more relatives with breast and/or bowel cancer? Yes Yes       Mammogram Screening: Recommended annual mammography  Pertinent mammograms are reviewed under the imaging tab.    History of abnormal Pap smear: NO - age 30-65 PAP every 5 years with negative HPV co-testing recommended  PAP / HPV Latest Ref Rng & Units 8/28/2017 8/6/2014 3/14/2011   PAP (Historical) - NIL NIL NIL   HPV16 NEG:Negative Negative - -   HPV18 NEG:Negative Negative - -   HRHPV NEG:Negative Negative - -     Reviewed and updated as needed this visit by clinical staff   Tobacco  Allergies  Meds   Med Hx  Surg Hx  Fam Hx  Soc Hx        Reviewed and updated as needed this visit by Provider                     Review of Systems   Constitutional: Negative for chills and fever.   HENT: Negative for congestion, ear pain, hearing loss and sore throat.    Eyes: Negative for pain and visual disturbance.   Respiratory: Negative for cough and shortness of breath.    Cardiovascular: Negative for chest pain, palpitations and peripheral edema.   Gastrointestinal: Negative for abdominal pain,  "constipation, diarrhea, heartburn, hematochezia and nausea.   Breasts:  Negative for tenderness, breast mass and discharge.   Genitourinary: Negative for dysuria, frequency, genital sores, hematuria, pelvic pain, urgency, vaginal bleeding and vaginal discharge.   Musculoskeletal: Negative for arthralgias, joint swelling and myalgias.   Skin: Negative for rash.   Neurological: Negative for dizziness, weakness, headaches and paresthesias.   Psychiatric/Behavioral: Negative for mood changes. The patient is not nervous/anxious.           OBJECTIVE:   /80 (BP Location: Right arm, Cuff Size: Adult Regular)   Pulse 78   Temp 98.4  F (36.9  C) (Tympanic)   Resp 16   Ht 1.715 m (5' 7.5\")   Wt 67.6 kg (149 lb)   LMP 10/07/2022 (Exact Date)   SpO2 99%   BMI 22.99 kg/m    Physical Exam  GENERAL: healthy, alert and no distress  EYES: Eyes grossly normal to inspection, PERRL and conjunctivae and sclerae normal  HENT: ear canals and TM's normal, nose and mouth without ulcers or lesions  NECK: no adenopathy, no asymmetry, masses, or scars and thyroid normal to palpation  RESP: lungs clear to auscultation - no rales, rhonchi or wheezes  BREAST: normal without masses, tenderness or nipple discharge and no palpable axillary masses or adenopathy  CV: regular rate and rhythm, normal S1 S2, no S3 or S4, no murmur, click or rub, no peripheral edema and peripheral pulses strong  ABDOMEN: soft, nontender, no hepatosplenomegaly, no masses and bowel sounds normal   (female): normal female external genitalia, normal urethral meatus, vaginal mucosa pink, moist, well rugated, and normal cervix/adnexa/uterus without masses or discharge  MS: no gross musculoskeletal defects noted, no edema  SKIN: no suspicious lesions or rashes  NEURO: Normal strength and tone, mentation intact and speech normal  PSYCH: mentation appears normal, affect normal/bright        ASSESSMENT/PLAN:       ICD-10-CM    1. Encounter for gynecological " "examination without abnormal finding  Z01.419       2. Migraine with aura and without status migrainosus, not intractable  G43.109 Improved.  SUMAtriptan (IMITREX) 25 MG tablet     OFFICE/OUTPT VISIT,EST,LEVL III      3. Dysmenorrhea  N94.6 Ob/Gyn Referral - patient interested in ablation.     OFFICE/OUTPT VISIT,EST,LEVL III      4. Screening for HIV (human immunodeficiency virus)  Z11.4 HIV Antigen Antibody Combo      5. Need for hepatitis C screening test  Z11.59 Hepatitis C Screen Reflex to HCV RNA Quant and Genotype      6. Cervical cancer screening  Z12.4 Pap Screen with HPV - recommended age 30 - 65 years      7. Screening for hyperlipidemia  Z13.220 Lipid panel reflex to direct LDL Non-fasting          Patient has been advised of split billing requirements and indicates understanding: Yes by AFR and CMA      COUNSELING:  Reviewed preventive health counseling, as reflected in patient instructions    Estimated body mass index is 22.99 kg/m  as calculated from the following:    Height as of this encounter: 1.715 m (5' 7.5\").    Weight as of this encounter: 67.6 kg (149 lb).        She reports that she has never smoked. She has never used smokeless tobacco.        The risks, benefits and treatment options of prescribed medications or other treatments have been discussed with the patient. The patient verbalized their understanding and should call or follow up if no improvement or if they develop further problems.    PENNY Nathan [facility]  "

## 2022-10-15 LAB
HCV AB SERPL QL IA: NONREACTIVE
HIV 1+2 AB+HIV1 P24 AG SERPL QL IA: NONREACTIVE

## 2022-10-18 LAB
BKR LAB AP GYN ADEQUACY: NORMAL
BKR LAB AP GYN INTERPRETATION: NORMAL
BKR LAB AP GYN OTHER FINDINGS: NORMAL
BKR LAB AP HPV REFLEX: NORMAL
BKR LAB AP PREVIOUS ABNORMAL: NORMAL
PATH REPORT.COMMENTS IMP SPEC: NORMAL
PATH REPORT.COMMENTS IMP SPEC: NORMAL
PATH REPORT.RELEVANT HX SPEC: NORMAL

## 2022-10-20 LAB
HUMAN PAPILLOMA VIRUS 16 DNA: NEGATIVE
HUMAN PAPILLOMA VIRUS 18 DNA: NEGATIVE
HUMAN PAPILLOMA VIRUS FINAL DIAGNOSIS: NORMAL
HUMAN PAPILLOMA VIRUS OTHER HR: NEGATIVE

## 2022-10-24 ENCOUNTER — OFFICE VISIT (OUTPATIENT)
Dept: OBGYN | Facility: CLINIC | Age: 52
End: 2022-10-24
Payer: COMMERCIAL

## 2022-10-24 VITALS
SYSTOLIC BLOOD PRESSURE: 112 MMHG | WEIGHT: 149 LBS | TEMPERATURE: 98.3 F | BODY MASS INDEX: 22.58 KG/M2 | HEART RATE: 42 BPM | DIASTOLIC BLOOD PRESSURE: 58 MMHG | HEIGHT: 68 IN | RESPIRATION RATE: 16 BRPM

## 2022-10-24 DIAGNOSIS — N92.0 MENORRHAGIA WITH REGULAR CYCLE: Primary | ICD-10-CM

## 2022-10-24 PROCEDURE — 99203 OFFICE O/P NEW LOW 30 MIN: CPT | Performed by: OBSTETRICS & GYNECOLOGY

## 2022-10-24 RX ORDER — MEDROXYPROGESTERONE ACETATE 10 MG
TABLET ORAL
Qty: 42 TABLET | Refills: 3 | Status: SHIPPED | OUTPATIENT
Start: 2022-10-24 | End: 2023-09-14

## 2022-10-24 NOTE — PROGRESS NOTES
Chief Complaint   Patient presents with     Consult         HPI:  Kalina Schreiber, 51 year old,  presents with complaints of heavy periods.  She has had 2 sets of twins.  Her last set of twins delivered by  17 years ago in .  Ever since having the twins her periods have been heavier.  She has regular periods that occur approximately every 24 days.  They are only heavy for 1 to 2 days and last a total of 4 to 6 days.  She estela sometimes has to schedule her activities around her periods and cannot go out with friends.  A few years ago she went to donate blood and they found that she had a low hemoglobin.  She had an endoscopy colonoscopy and ultrasound that were all normal.  She was placed on iron and her hemoglobin has been better and she has been able to donate blood since.  Her  has had a vasectomy.  Her periods are not painful sometimes she has some mild breast tenderness sometimes she has some cramps but nothing that is regular bothersome.  She is wanting to know if there is something she can do about her periods instead of just waiting for menopause.  She does not particularly want a hysterectomy and she was told about an endometrial ablation.      Past Medical History:   Diagnosis Date     Allergic rhinitis due to other allergen      Basal cell carcinoma      Solitary cyst of breast      Twin pregnancy, delivered      Past Surgical History:   Procedure Laterality Date     ABDOMEN SURGERY  1995    C-sections     BREAST SURGERY  10/2002    Augmentation     COLONOSCOPY  2012    Procedure:COLONOSCOPY; Colonoscopy  ; Surgeon:BOB TY; Location:[facility]     COLONOSCOPY N/A 2016    Procedure: COLONOSCOPY;  Surgeon: Markus Grossman MD;  Location: [facility]     COSMETIC SURGERY      Breast augmentation     ESOPHAGOSCOPY, GASTROSCOPY, DUODENOSCOPY (EGD), COMBINED N/A 2016    Procedure: COMBINED ESOPHAGOSCOPY, GASTROSCOPY, DUODENOSCOPY (EGD);  Surgeon:  Markus Grossman MD;  Location: [facility]     LASER SURGERY OF EYE  08/21/2008    Both eyes     SURGICAL HISTORY OF -       T&A     SURGICAL HISTORY OF -       Csection     SURGICAL HISTORY OF -       breast augmentation     Social History     Socioeconomic History     Marital status:      Spouse name: Not on file     Number of children: Not on file     Years of education: Not on file     Highest education level: Not on file   Occupational History     Employer: SELF   Tobacco Use     Smoking status: Never     Smokeless tobacco: Never   Vaping Use     Vaping Use: Never used   Substance and Sexual Activity     Alcohol use: Yes     Comment: 0-1 drinks per day     Drug use: No     Sexual activity: Yes     Partners: Male     Birth control/protection: Male Surgical     Comment: vas   Other Topics Concern     Parent/sibling w/ CABG, MI or angioplasty before 65F 55M? 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      Surrogate for twins     Social Determinants of Health     Financial Resource Strain: Not on file   Food Insecurity: Not on file   Transportation Needs: Not on file   Physical Activity: Not on file   Stress: Not on file   Social Connections: Not on file   Intimate Partner Violence: Not on file   Housing Stability: Not on file     Family History   Problem Relation Age of Onset     Alcohol/Drug Mother      Cancer Mother         skin, COD: lung, liver, bone cancer     Melanoma Mother      Diabetes Mother         Post transplant onset     Other Cancer Mother         Lung, liver, bone     Alcohol/Drug Father      Hypertension Father      Cancer Maternal Grandmother         lung/liver     Asthma Maternal Grandmother      Other Cancer Maternal Grandmother         Lung, liver, bone     Cancer Maternal Grandfather         skin     Cancer - colorectal Maternal Grandfather      Melanoma Maternal Grandfather      Colon Cancer Maternal Grandfather      C.A.D. No family hx of   "    Cerebrovascular Disease No family hx of      Breast Cancer No family hx of      Prostate Cancer No family hx of         Current Outpatient Medications   Medication Sig Dispense Refill     ferrous sulfate (IRON) 325 (65 FE) MG tablet Take 1 tablet (325 mg) by mouth 2 times daily 180 tablet 3     fexofenadine (ALLEGRA) 180 MG tablet Take 1 tablet (180 mg) by mouth daily 30 tablet 0     fluocinonide (LIDEX) 0.05 % external solution Apply twice daily as needed to frontal scalp. 60 mL 9     ibuprofen (ADVIL/MOTRIN) 200 MG capsule Take 200 mg by mouth every 4 hours as needed.       medroxyPROGESTERone (PROVERA) 10 MG tablet Can take up to 2 pills, three times a day for 7 days for heavy bleeding. 42 tablet 3     Oxymetazoline HCl (NASAL SPRAY NA) Nasoquort 1 spray in each nostril daily when needed       SUMAtriptan (IMITREX) 25 MG tablet Take 1-2 tablets (25-50 mg) by mouth at onset of headache for migraine May repeat dose in 2 hours.  Do not exceed 200 mg in 24 hours 18 tablet 11     UNKNOWN TO PATIENT Allergy Eye Drops, 1 drop in each eye daily PRN          Allergies:     Allergies   Allergen Reactions     Nkda [No Known Drug Allergies]         ROS:  See HPI      Physical Exam:  /58 (BP Location: Right arm, Patient Position: Chair)   Pulse (!) 42   Temp 98.3  F (36.8  C)   Resp 16   Ht 1.715 m (5' 7.5\")   Wt 67.6 kg (149 lb)   LMP 10/07/2022 (Exact Date)   BMI 22.99 kg/m       Appearance: well developed, well nourished, no acute distress  Head Exam normocephalic, no lesions or deformities  Psych: orientated x3          HISTORY: Right ovarian cyst followup.     TECHNIQUE:  Transvaginal images were performed to better evaluate the  patient's uterus, ovaries and endometrial stripe.     COMPARISON: 5/17/2018.     FINDINGS:  The uterus is measured at 11.9 x 6.4 x 7.7 cm. No fibroids  are evident. The appearance of the uterus is again suggestive of a  septate or bicornuate configuration. Endometrial stripe " measures 1.3  cm on the right and 1 cm on the left, within normal limits for a  premenopausal patient. The right ovary is unremarkable. The left ovary  is unremarkable. No solid adnexal masses are identified. No free  pelvic fluid is present. Color Doppler blood flow is noted within the  ovaries bilaterally.                                                                      IMPRESSION:   1. No ovarian cystic lesions are identified on today's exam.  2. The uterus again has an appearance suggestive of a septate or  bicornuate configuration.        Assessment/Plan:  Encounter Diagnosis   Name Primary?     Menorrhagia with regular cycle Yes       Briefly discussed options for bleeding including exercise and ibuprofen, birth control, IUDs, ablations and hysterectomies.  She is not a candidate for estrogen containing birth control due to migraines with aura.      We discussed her prior ultrasounds suggestive of a septate or bicornuate uterus. The ultrasound was done in 2018.  We did discuss that this limits our use of IUDs and newer techniques for endometrial ablation.  We did discuss there is a potential of being able to do rollerball ablation.  We discussed further investigation to see if there is a septum including repeat ultrasound, hysterosonogram or MRI.  She was not wanting to do a lot of imaging if it just shows a septum and she can't do those treatments anyway.  She is not wanting a hysterectomy.    We discussed a trial of progesterone during her heavy days to slow significant bleeding so she doesn't have to arrange her schedule around her heavy days.  She would like to try that.  If she wishes to pursue other options, I recommended starting with getting a current ultrasound and follow up in the office.      37 minutes spent on 10/24/2022 doing chart review, review of test results, patient visit and documentation.      Saida Phillips MD on 10/24/2022 at 2:19 PM

## 2022-10-24 NOTE — NURSING NOTE
"Initial /58 (BP Location: Right arm, Patient Position: Chair)   Pulse (!) 42   Temp 98.3  F (36.8  C)   Resp 16   Ht 1.715 m (5' 7.5\")   Wt 67.6 kg (149 lb)   LMP 10/07/2022 (Exact Date)   BMI 22.99 kg/m   Estimated body mass index is 22.99 kg/m  as calculated from the following:    Height as of this encounter: 1.715 m (5' 7.5\").    Weight as of this encounter: 67.6 kg (149 lb). .      "

## 2022-12-01 ENCOUNTER — MYC MEDICAL ADVICE (OUTPATIENT)
Dept: FAMILY MEDICINE | Facility: CLINIC | Age: 52
End: 2022-12-01

## 2022-12-02 NOTE — TELEPHONE ENCOUNTER
MyChart reply sent to patient based on Heart Rate and Heartbeat [id number] and closing encounter.     Sofie Fowler RN  [facility]

## 2022-12-07 ENCOUNTER — TELEPHONE (OUTPATIENT)
Dept: FAMILY MEDICINE | Facility: CLINIC | Age: 52
End: 2022-12-07

## 2022-12-07 NOTE — TELEPHONE ENCOUNTER
Reason for call:    Symptom or request:     Patient called stating that her abdomin pain as return, she tried to schedule a ct scan that was order 2021 which has . Would Magy reorder scan or would patient need to schedule for a more current visit.     Patient reports her sx are: feels like bubbles. Moving. Wakes her up at nighttime, then she is thinking about what it could be. Comes and goes. Stress related? She reports no pain specifically    Patient does have OV on -- requesting a sooner appt.      Best Time:  any    Can we leave a detailed message on this number?  YES     Kary CELESTE  Station

## 2022-12-08 NOTE — TELEPHONE ENCOUNTER
Attempted to contact patient for further triage, no answer. VM left to return call to clinic.    Francine Henderson RN  [facility]

## 2022-12-08 NOTE — TELEPHONE ENCOUNTER
The patient is not having any pain right. The patient reports she feels an odd sensation in her stomach.  The patient states it feels like bubbles moving in the maybe bladder area.  It is always there.  There is no issues with urination or bowel movements.  The patient has appt on the 12/21/22. The patient was wondering if we can reorder the CT from 2021 that she was suppose to have done?    Thank you    Karen CARSON RN

## 2022-12-09 NOTE — TELEPHONE ENCOUNTER
Left message for patient to return call to clinic RN. See Magy's message below from today.  Fatou Live RN

## 2022-12-15 ENCOUNTER — HOSPITAL ENCOUNTER (OUTPATIENT)
Dept: MAMMOGRAPHY | Facility: CLINIC | Age: 52
Discharge: HOME | End: 2022-12-15
Attending: NURSE PRACTITIONER | Admitting: NURSE PRACTITIONER
Payer: COMMERCIAL

## 2022-12-15 DIAGNOSIS — Z12.31 VISIT FOR SCREENING MAMMOGRAM: ICD-10-CM

## 2022-12-15 PROCEDURE — 77063 BREAST TOMOSYNTHESIS BI: CPT

## 2022-12-21 ENCOUNTER — OFFICE VISIT (OUTPATIENT)
Dept: FAMILY MEDICINE | Facility: CLINIC | Age: 52
End: 2022-12-21
Payer: COMMERCIAL

## 2022-12-21 VITALS
RESPIRATION RATE: 16 BRPM | TEMPERATURE: 98 F | BODY MASS INDEX: 23.49 KG/M2 | SYSTOLIC BLOOD PRESSURE: 120 MMHG | WEIGHT: 155 LBS | OXYGEN SATURATION: 100 % | HEIGHT: 68 IN | DIASTOLIC BLOOD PRESSURE: 70 MMHG | HEART RATE: 64 BPM

## 2022-12-21 DIAGNOSIS — R00.1 BRADYCARDIA: Primary | ICD-10-CM

## 2022-12-21 PROCEDURE — 99213 OFFICE O/P EST LOW 20 MIN: CPT | Mod: 25 | Performed by: NURSE PRACTITIONER

## 2022-12-21 PROCEDURE — 90750 HZV VACC RECOMBINANT IM: CPT | Performed by: NURSE PRACTITIONER

## 2022-12-21 PROCEDURE — 90471 IMMUNIZATION ADMIN: CPT | Performed by: NURSE PRACTITIONER

## 2022-12-21 ASSESSMENT — PAIN SCALES - GENERAL: PAINLEVEL: NO PAIN (0)

## 2022-12-21 NOTE — PROGRESS NOTES
"  Assessment & Plan     Bradycardia  Has had a few episodes of HR <50  Asymptomatic.  Will order 14 day Zio patch  - Adult Leadless EKG Monitor 8 to 14 Days; Future      The risks, benefits and treatment options of prescribed medications or other treatments have been discussed with the patient. The patient verbalized their understanding and should call or follow up if no improvement or if they develop further problems.    PENNY Nathan CNP  [facility] [place] is a 52 year old, presenting for the following health issues:  Patient Request (Heart rate concern) and Imm/Inj (shingles)      History of Present Illness       Reason for visit:  Low heart rate  Symptom onset:  More than a month  Symptoms include:  None other than being told by provider it was low.  fit bit also went off telling her it was low  Symptom progression:  Staying the same  Had these symptoms before:  No    She eats 2-3 servings of fruits and vegetables daily.She consumes 0 sweetened beverage(s) daily.She exercises with enough effort to increase her heart rate 30 to 60 minutes per day.  She exercises with enough effort to increase her heart rate 5 days per week.   She is taking medications regularly.       Nov 22 - Fit bit recorded a heart rate of <49 for 10 minutes in the evening.  Average HR on the Fit bit over the last 6 months is 57-61    HR in OB/GYN in October was 42    Denies any symptoms.          Review of Systems   Constitutional, HEENT, cardiovascular, pulmonary, gi and gu systems are negative, except as otherwise noted.      Objective    /70 (BP Location: Right arm, Cuff Size: Adult Regular)   Pulse 64   Temp 98  F (36.7  C) (Tympanic)   Resp 16   Ht 1.715 m (5' 7.5\")   Wt 70.3 kg (155 lb)   SpO2 100%   BMI 23.92 kg/m    Body mass index is 23.92 kg/m .  Physical Exam   GENERAL: healthy, alert and no distress  RESP: lungs clear to auscultation - no rales, rhonchi or " wheezes  CV: regular rate and rhythm, normal S1 S2, no S3 or S4, no murmur, click or rub, no peripheral edema and peripheral pulses strong

## 2022-12-21 NOTE — NURSING NOTE
Prior to immunization administration, verified patients identity using patient s name and date of birth. Please see Immunization Activity for additional information.     Screening Questionnaire for Adult Immunization    Are you sick today?   No   Do you have allergies to medications, food, a vaccine component or latex?   No   Have you ever had a serious reaction after receiving a vaccination?   No   Do you have a long-term health problem with heart, lung, kidney, or metabolic disease (e.g., diabetes), asthma, a blood disorder, no spleen, complement component deficiency, a cochlear implant, or a spinal fluid leak?  Are you on long-term aspirin therapy?   No   Do you have cancer, leukemia, HIV/AIDS, or any other immune system problem?   No   Do you have a parent, brother, or sister with an immune system problem?   No   In the past 3 months, have you taken medications that affect  your immune system, such as prednisone, other steroids, or anticancer drugs; drugs for the treatment of rheumatoid arthritis, Crohn s disease, or psoriasis; or have you had radiation treatments?   No   Have you had a seizure, or a brain or other nervous system problem?   No   During the past year, have you received a transfusion of blood or blood    products, or been given immune (gamma) globulin or antiviral drug?   No   For women: Are you pregnant or is there a chance you could become       pregnant during the next month?   No   Have you received any vaccinations in the past 4 weeks?   No     Immunization questionnaire answers were all negative.        Per orders of Dr. Franco, injection of Shingrix given by BUTCH OBRIEN. Patient instructed to remain in clinic for 15 minutes afterwards, and to report any adverse reaction to me immediately.       Screening performed by BUTCH OBRIEN on 12/21/2022 at 1:10 PM.

## 2022-12-22 ENCOUNTER — HOSPITAL ENCOUNTER (OUTPATIENT)
Dept: CARDIOLOGY | Facility: CLINIC | Age: 52
Discharge: HOME | End: 2022-12-22
Attending: NURSE PRACTITIONER | Admitting: NURSE PRACTITIONER
Payer: COMMERCIAL

## 2022-12-22 DIAGNOSIS — R00.1 BRADYCARDIA: ICD-10-CM

## 2022-12-22 PROCEDURE — 93248 EXT ECG>7D<15D REV&INTERPJ: CPT | Performed by: INTERNAL MEDICINE

## 2022-12-22 PROCEDURE — 93246 EXT ECG>7D<15D RECORDING: CPT

## 2023-01-12 DIAGNOSIS — I47.20 VENTRICULAR TACHYCARDIA (H): Primary | ICD-10-CM

## 2023-01-27 ENCOUNTER — OFFICE VISIT (OUTPATIENT)
Dept: CARDIOLOGY | Facility: CLINIC | Age: 53
End: 2023-01-27
Attending: NURSE PRACTITIONER
Payer: COMMERCIAL

## 2023-01-27 VITALS
RESPIRATION RATE: 16 BRPM | SYSTOLIC BLOOD PRESSURE: 114 MMHG | HEART RATE: 57 BPM | HEIGHT: 68 IN | WEIGHT: 151 LBS | DIASTOLIC BLOOD PRESSURE: 54 MMHG | BODY MASS INDEX: 22.88 KG/M2

## 2023-01-27 DIAGNOSIS — I47.20 VENTRICULAR TACHYCARDIA (H): ICD-10-CM

## 2023-01-27 DIAGNOSIS — I49.3 PVC (PREMATURE VENTRICULAR CONTRACTION): Primary | ICD-10-CM

## 2023-01-27 DIAGNOSIS — R00.2 PALPITATIONS: ICD-10-CM

## 2023-01-27 DIAGNOSIS — Z82.79 FAMILY HISTORY OF BICUSPID AORTIC VALVE: ICD-10-CM

## 2023-01-27 LAB
ATRIAL RATE - MUSE: 81 BPM
DIASTOLIC BLOOD PRESSURE - MUSE: NORMAL MMHG
INTERPRETATION ECG - MUSE: NORMAL
P AXIS - MUSE: 65 DEGREES
PR INTERVAL - MUSE: 124 MS
QRS DURATION - MUSE: 76 MS
QT - MUSE: 364 MS
QTC - MUSE: 445 MS
R AXIS - MUSE: 55 DEGREES
SYSTOLIC BLOOD PRESSURE - MUSE: NORMAL MMHG
T AXIS - MUSE: 69 DEGREES
VENTRICULAR RATE- MUSE: 90 BPM

## 2023-01-27 PROCEDURE — 99244 OFF/OP CNSLTJ NEW/EST MOD 40: CPT | Performed by: GENERAL ACUTE CARE HOSPITAL

## 2023-01-27 PROCEDURE — 93000 ELECTROCARDIOGRAM COMPLETE: CPT | Performed by: INTERNAL MEDICINE

## 2023-01-27 RX ORDER — METOPROLOL SUCCINATE 25 MG/1
25 TABLET, EXTENDED RELEASE ORAL DAILY
Qty: 90 TABLET | Refills: 3 | Status: SHIPPED | OUTPATIENT
Start: 2023-01-27 | End: 2023-02-20

## 2023-01-27 RX ORDER — METOPROLOL SUCCINATE 25 MG/1
25 TABLET, EXTENDED RELEASE ORAL DAILY
Qty: 90 TABLET | Refills: 3 | Status: SHIPPED | OUTPATIENT
Start: 2023-01-27 | End: 2023-01-27

## 2023-01-27 NOTE — PROGRESS NOTES
HEART CARE ENCOUNTER NOTE      Thank you, Magy FRANCIS CNP for asking the [facility] team to see Ms. Kalina Schreiber to evaluate ventricular tachycardia.    Assessment/Recommendations   Assessment:    1. Frequent premature ventricular contractions and nonsustained ventricular tachycardia. Unclear etiology. Minimally symptomatic but this does increase her risk of developing a cardiomyopathy.  2. Family history of bicuspid aortic valve. She was noted to have a normal trileaflet aortic valve on transthoracic echocardiography 5/12/2014.    Plan:  1. Start metoprolol succinate 25 mg daily.  2. 24-hour Holter monitor to assess response to metoprolol.  3. Transthoracic echocardiogram.  4. Stress cardiac MRI.  5. If we are unable to adequately control her ventricular arrhythmias with beta blocker therapy, we will refer her to electrophysiology for consideration of an electrophysiology study and ablation.  6. Follow-up with me in 3 months.         History of Present Illness   Ms. Kalina Schreiber is a 52 year old female with no significant past history presenting for evaluation of PVCs and VT. She has had PVCs for at least 8 years, occasionally feeling palpitations. However, recently she had noted on multiple occasions having a low HR in the 40s. She had not felt any differently. She then wore a 14-day Zio patch showing frequent short runs of NSVT and frequent PVCs.    She did have COVID in July and a viral URI around Thanksgiving, otherwise no major illness. No recent medication changes. She walks 26766 steps a day and tries to stay active. No chest pain/pressure/tightness, shortness of breath at rest or with exertion, light headedness/dizziness, pre-syncope, syncope, lower extremity swelling, paroxysmal nocturnal dyspnea (PND), or orthopnea.    Her son has a bicuspid AV. She ws screened with a TTE in 2014, which showed a normal trileaflet AV. No family history of arrhythmia or sudden cardiac  death.     Cardiac Problems and Cardiac Diagnostics     Most Recent Cardiac testing:  ECG dated 1/27/2023 (personaly reviewed and interpreted): SR with frequent PVCs    Zio patch 12/22/2022 (report reviewed):  1. Monitored from 12/20/2022 - 1/5/2023 for 12 days 8 hours of analyzable data.  2. Patient had a minimum HR of 44 bpm, max  bpm, average HR 77 bpm.  3. 46 runs of NSVT noted, with the fastest run being 4 beats at 182 bpm and the longest run being 4 beats at a rate of 152 bpm.  4. 2 SVT runs occurred, the fastest being 6 beats at 162 bpm and the longest being 11 beats at a rate of 157 bpm.  5. Rare supraventricular ectopy.  6. Frequent PVCs, 26.6% isolated, 4.5% in couplets.  7. Symptoms correlated with NSVT.    ECHO 5/12/2014 (report reviewed):   1. Left ventricular size, wall thickness, and systolic function are normal. The estimated left ventricular ejection fraction is 55-60%.  2. Right ventricular size and systolic function are normal.  3. No hemodynamically significant valvular abnormalities including normal trileaflet aortic valve.       Medications  Allergies   Current Outpatient Medications   Medication Sig Dispense Refill     ferrous sulfate (IRON) 325 (65 FE) MG tablet Take 1 tablet (325 mg) by mouth 2 times daily 180 tablet 3     fexofenadine (ALLEGRA) 180 MG tablet Take 1 tablet (180 mg) by mouth daily (Patient taking differently: Take 180 mg by mouth daily as needed) 30 tablet 0     fluocinonide (LIDEX) 0.05 % external solution Apply twice daily as needed to frontal scalp. 60 mL 9     ibuprofen (ADVIL/MOTRIN) 200 MG capsule Take 200 mg by mouth every 4 hours as needed.       medroxyPROGESTERone (PROVERA) 10 MG tablet Can take up to 2 pills, three times a day for 7 days for heavy bleeding. 42 tablet 3     Oxymetazoline HCl (NASAL SPRAY NA) Nasoquort 1 spray in each nostril daily when needed       SUMAtriptan (IMITREX) 25 MG tablet Take 1-2 tablets (25-50 mg) by mouth at onset of headache for  "migraine May repeat dose in 2 hours.  Do not exceed 200 mg in 24 hours 18 tablet 11     UNKNOWN TO PATIENT Allergy Eye Drops, 1 drop in each eye daily PRN        Allergies   Allergen Reactions     Nkda [No Known Drug Allergies]         Physical Examination Review of Systems   /54 (BP Location: Right arm, Patient Position: Sitting, Cuff Size: Adult Regular)   Pulse 57   Resp 16   Ht 1.715 m (5' 7.5\")   Wt 68.5 kg (151 lb)   BMI 23.30 kg/m    Body mass index is 23.3 kg/m .  Wt Readings from Last 3 Encounters:   01/27/23 68.5 kg (151 lb)   12/21/22 70.3 kg (155 lb)   10/24/22 67.6 kg (149 lb)       General Appearance:   Pleasant  female, appears  stated age. no acute distress, normal body habitus   ENT/Mouth: Facemask.    EYES:  no scleral icterus, normal conjunctivae   Neck: no carotid bruits. No anterior cervical lymphadenopaty   Respiratory:   lungs are clear to auscultation, no rales or wheezing, equal chest wall expansion    Cardiovascular:   Regular rhythm with frequent ectopic beats, normal rate. Normal first and second heart sounds with no murmurs, rubs, or gallops; the carotid, radial and posterior tibial pulses are intact, Jugular venous pressure normal, no edema bilaterally    Abdomen/GI:  no organomegaly, masses, bruits, or tenderness; bowel sounds are present   Extremities: no cyanosis or clubbing   Skin: no xanthelasma, warm.    Heme/lymph/ Immunology No apparent bleeding noted.   Neurologic: Alert and oriented. normal gait, no tremors     Psychiatric: Pleasant, calm, appropriate affect.    A complete 10 system review of systems was performed and is negative except as mentioned in the HPI/subjective.         Past History   Past Medical History:   Past Medical History:   Diagnosis Date     Allergic rhinitis due to other allergen      Basal cell carcinoma      Solitary cyst of breast      Twin pregnancy, delivered        Past Surgical History:   Past Surgical History:   Procedure Laterality Date "     ABDOMEN SURGERY  6/1995 12/2005    C-sections     BREAST SURGERY  10/2002    Augmentation     COLONOSCOPY  02/23/2012    Procedure:COLONOSCOPY; Colonoscopy  ; Surgeon:BOB TY; Location:[facility]     COLONOSCOPY N/A 05/06/2016    Procedure: COLONOSCOPY;  Surgeon: Markus Grossman MD;  Location: [facility]     COSMETIC SURGERY      Breast augmentation     ESOPHAGOSCOPY, GASTROSCOPY, DUODENOSCOPY (EGD), COMBINED N/A 05/06/2016    Procedure: COMBINED ESOPHAGOSCOPY, GASTROSCOPY, DUODENOSCOPY (EGD);  Surgeon: Markus Grossman MD;  Location: [facility]     LASER SURGERY OF EYE  08/21/2008    Both eyes     MAMMOPLASTY AUGMENTATION       SURGICAL HISTORY OF -       T&A     SURGICAL HISTORY OF -       Csection     SURGICAL HISTORY OF -       breast augmentation       Family History:   Family History   Problem Relation Age of Onset     Alcohol/Drug Mother      Cancer Mother         skin, COD: lung, liver, bone cancer     Melanoma Mother      Diabetes Mother         Post transplant onset     Other Cancer Mother         Lung, liver, bone     Alcohol/Drug Father      Hypertension Father      Cancer Maternal Grandmother         lung/liver     Asthma Maternal Grandmother      Other Cancer Maternal Grandmother         Lung, liver, bone     Cancer Maternal Grandfather         skin     Cancer - colorectal Maternal Grandfather      Melanoma Maternal Grandfather      Colon Cancer Maternal Grandfather      C.A.D. No family hx of      Cerebrovascular Disease No family hx of      Breast Cancer No family hx of      Prostate Cancer No family hx of         Social History:   Social History     Socioeconomic History     Marital status:      Spouse name: Not on file     Number of children: Not on file     Years of education: Not on file     Highest education level: Not on file   Occupational History     Employer: SELF   Tobacco Use     Smoking status: Never     Smokeless tobacco: Never   Vaping Use     Vaping Use: Never used    Substance and Sexual Activity     Alcohol use: Yes     Comment: 0-1 drinks per day     Drug use: No     Sexual activity: Yes     Partners: Male     Birth control/protection: Male Surgical     Comment: vas   Other Topics Concern     Parent/sibling w/ CABG, MI or angioplasty before 65F 55M? 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      Surrogate for twins     Social Determinants of Health     Financial Resource Strain: Not on file   Food Insecurity: Not on file   Transportation Needs: Not on file   Physical Activity: Not on file   Stress: Not on file   Social Connections: Not on file   Intimate Partner Violence: Not on file   Housing Stability: Not on file              Lab Results    Chemistry/lipid CBC Cardiac Enzymes/BNP/TSH/INR   Lab Results   Component Value Date    CHOL 195 10/14/2022    HDL 82 10/14/2022    LDL 99 10/14/2022    TRIG 72 10/14/2022    CR 0.95 08/13/2021    BUN 14 08/13/2021    POTASSIUM 3.7 08/13/2021     08/13/2021    CO2 28 08/13/2021      Lab Results   Component Value Date    WBC 4.9 08/13/2021    HGB 13.1 08/13/2021    HCT 40.2 08/13/2021    MCV 96 08/13/2021     08/13/2021      Lab Results   Component Value Date    TSH 4.39 03/19/2011          Cristiano Madrid MD [facility]  Non-Invasive Cardiologist  [facility]  Pager [phone]

## 2023-01-27 NOTE — PATIENT INSTRUCTIONS
"We will start a heart medicine called \"metoprolol\".  We will repeat a 24-hour heart monitor once you are on metoprolol.  We will also schedule you for an echocardiogram and an MRI.  See me back in 3 months.  "

## 2023-01-27 NOTE — LETTER
1/27/2023    PENNY Nathan CNP  [address] [facility] [address]    RE: Kalina Schreiber       Dear Colleague,     I had the pleasure of seeing Kalina Schreiber in the [facility].  HEART CARE ENCOUNTER NOTE      Thank you, Magy FRANCIS CNP for asking the [facility] team to see Ms. Kalina Schreiber to evaluate ventricular tachycardia.    Assessment/Recommendations   Assessment:    1. Frequent premature ventricular contractions and nonsustained ventricular tachycardia. Unclear etiology. Minimally symptomatic but this does increase her risk of developing a cardiomyopathy.  2. Family history of bicuspid aortic valve. She was noted to have a normal trileaflet aortic valve on transthoracic echocardiography 5/12/2014.    Plan:  1. Start metoprolol succinate 25 mg daily.  2. 24-hour Holter monitor to assess response to metoprolol.  3. Transthoracic echocardiogram.  4. Stress cardiac MRI.  5. If we are unable to adequately control her ventricular arrhythmias with beta blocker therapy, we will refer her to electrophysiology for consideration of an electrophysiology study and ablation.  6. Follow-up with me in 3 months.         History of Present Illness   Ms. Kalina Schreiber is a 52 year old female with no significant past history presenting for evaluation of PVCs and VT. She has had PVCs for at least 8 years, occasionally feeling palpitations. However, recently she had noted on multiple occasions having a low HR in the 40s. She had not felt any differently. She then wore a 14-day Zio patch showing frequent short runs of NSVT and frequent PVCs.    She did have COVID in July and a viral URI around Thanksgiving, otherwise no major illness. No recent medication changes. She walks 12269 steps a day and tries to stay active. No chest pain/pressure/tightness, shortness of breath at rest or with exertion, light headedness/dizziness, pre-syncope, syncope, lower extremity  swelling, paroxysmal nocturnal dyspnea (PND), or orthopnea.    Her son has a bicuspid AV. She ws screened with a TTE in 2014, which showed a normal trileaflet AV. No family history of arrhythmia or sudden cardiac death.     Cardiac Problems and Cardiac Diagnostics     Most Recent Cardiac testing:  ECG dated 1/27/2023 (personaly reviewed and interpreted): SR with frequent PVCs    Zio patch 12/22/2022 (report reviewed):  1. Monitored from 12/20/2022 - 1/5/2023 for 12 days 8 hours of analyzable data.  2. Patient had a minimum HR of 44 bpm, max  bpm, average HR 77 bpm.  3. 46 runs of NSVT noted, with the fastest run being 4 beats at 182 bpm and the longest run being 4 beats at a rate of 152 bpm.  4. 2 SVT runs occurred, the fastest being 6 beats at 162 bpm and the longest being 11 beats at a rate of 157 bpm.  5. Rare supraventricular ectopy.  6. Frequent PVCs, 26.6% isolated, 4.5% in couplets.  7. Symptoms correlated with NSVT.    ECHO 5/12/2014 (report reviewed):   1. Left ventricular size, wall thickness, and systolic function are normal. The estimated left ventricular ejection fraction is 55-60%.  2. Right ventricular size and systolic function are normal.  3. No hemodynamically significant valvular abnormalities including normal trileaflet aortic valve.       Medications  Allergies   Current Outpatient Medications   Medication Sig Dispense Refill     ferrous sulfate (IRON) 325 (65 FE) MG tablet Take 1 tablet (325 mg) by mouth 2 times daily 180 tablet 3     fexofenadine (ALLEGRA) 180 MG tablet Take 1 tablet (180 mg) by mouth daily (Patient taking differently: Take 180 mg by mouth daily as needed) 30 tablet 0     fluocinonide (LIDEX) 0.05 % external solution Apply twice daily as needed to frontal scalp. 60 mL 9     ibuprofen (ADVIL/MOTRIN) 200 MG capsule Take 200 mg by mouth every 4 hours as needed.       medroxyPROGESTERone (PROVERA) 10 MG tablet Can take up to 2 pills, three times a day for 7 days for heavy  "bleeding. 42 tablet 3     Oxymetazoline HCl (NASAL SPRAY NA) Nasoquort 1 spray in each nostril daily when needed       SUMAtriptan (IMITREX) 25 MG tablet Take 1-2 tablets (25-50 mg) by mouth at onset of headache for migraine May repeat dose in 2 hours.  Do not exceed 200 mg in 24 hours 18 tablet 11     UNKNOWN TO PATIENT Allergy Eye Drops, 1 drop in each eye daily PRN        Allergies   Allergen Reactions     Nkda [No Known Drug Allergies]         Physical Examination Review of Systems   /54 (BP Location: Right arm, Patient Position: Sitting, Cuff Size: Adult Regular)   Pulse 57   Resp 16   Ht 1.715 m (5' 7.5\")   Wt 68.5 kg (151 lb)   BMI 23.30 kg/m    Body mass index is 23.3 kg/m .  Wt Readings from Last 3 Encounters:   01/27/23 68.5 kg (151 lb)   12/21/22 70.3 kg (155 lb)   10/24/22 67.6 kg (149 lb)       General Appearance:   Pleasant  female, appears  stated age. no acute distress, normal body habitus   ENT/Mouth: Facemask.    EYES:  no scleral icterus, normal conjunctivae   Neck: no carotid bruits. No anterior cervical lymphadenopaty   Respiratory:   lungs are clear to auscultation, no rales or wheezing, equal chest wall expansion    Cardiovascular:   Regular rhythm with frequent ectopic beats, normal rate. Normal first and second heart sounds with no murmurs, rubs, or gallops; the carotid, radial and posterior tibial pulses are intact, Jugular venous pressure normal, no edema bilaterally    Abdomen/GI:  no organomegaly, masses, bruits, or tenderness; bowel sounds are present   Extremities: no cyanosis or clubbing   Skin: no xanthelasma, warm.    Heme/lymph/ Immunology No apparent bleeding noted.   Neurologic: Alert and oriented. normal gait, no tremors     Psychiatric: Pleasant, calm, appropriate affect.    A complete 10 system review of systems was performed and is negative except as mentioned in the HPI/subjective.         Past History   Past Medical History:   Past Medical History:   Diagnosis " Date     Allergic rhinitis due to other allergen      Basal cell carcinoma      Solitary cyst of breast      Twin pregnancy, delivered        Past Surgical History:   Past Surgical History:   Procedure Laterality Date     ABDOMEN SURGERY  6/1995 12/2005    C-sections     BREAST SURGERY  10/2002    Augmentation     COLONOSCOPY  02/23/2012    Procedure:COLONOSCOPY; Colonoscopy  ; Surgeon:BOB TY; Location:[facility]     COLONOSCOPY N/A 05/06/2016    Procedure: COLONOSCOPY;  Surgeon: Markus Grossman MD;  Location: [facility]     COSMETIC SURGERY      Breast augmentation     ESOPHAGOSCOPY, GASTROSCOPY, DUODENOSCOPY (EGD), COMBINED N/A 05/06/2016    Procedure: COMBINED ESOPHAGOSCOPY, GASTROSCOPY, DUODENOSCOPY (EGD);  Surgeon: Markus Grossman MD;  Location: [facility]     LASER SURGERY OF EYE  08/21/2008    Both eyes     MAMMOPLASTY AUGMENTATION       SURGICAL HISTORY OF -       T&A     SURGICAL HISTORY OF -       Csection     SURGICAL HISTORY OF -       breast augmentation       Family History:   Family History   Problem Relation Age of Onset     Alcohol/Drug Mother      Cancer Mother         skin, COD: lung, liver, bone cancer     Melanoma Mother      Diabetes Mother         Post transplant onset     Other Cancer Mother         Lung, liver, bone     Alcohol/Drug Father      Hypertension Father      Cancer Maternal Grandmother         lung/liver     Asthma Maternal Grandmother      Other Cancer Maternal Grandmother         Lung, liver, bone     Cancer Maternal Grandfather         skin     Cancer - colorectal Maternal Grandfather      Melanoma Maternal Grandfather      Colon Cancer Maternal Grandfather      C.A.D. No family hx of      Cerebrovascular Disease No family hx of      Breast Cancer No family hx of      Prostate Cancer No family hx of         Social History:   Social History     Socioeconomic History     Marital status:      Spouse name: Not on file     Number of children: Not on file     Years of  education: Not on file     Highest education level: Not on file   Occupational History     Employer: SELF   Tobacco Use     Smoking status: Never     Smokeless tobacco: Never   Vaping Use     Vaping Use: Never used   Substance and Sexual Activity     Alcohol use: Yes     Comment: 0-1 drinks per day     Drug use: No     Sexual activity: Yes     Partners: Male     Birth control/protection: Male Surgical     Comment: vas   Other Topics Concern     Parent/sibling w/ CABG, MI or angioplasty before 65F 55M? 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      Surrogate for twins     Social Determinants of Health     Financial Resource Strain: Not on file   Food Insecurity: Not on file   Transportation Needs: Not on file   Physical Activity: Not on file   Stress: Not on file   Social Connections: Not on file   Intimate Partner Violence: Not on file   Housing Stability: Not on file              Lab Results    Chemistry/lipid CBC Cardiac Enzymes/BNP/TSH/INR   Lab Results   Component Value Date    CHOL 195 10/14/2022    HDL 82 10/14/2022    LDL 99 10/14/2022    TRIG 72 10/14/2022    CR 0.95 08/13/2021    BUN 14 08/13/2021    POTASSIUM 3.7 08/13/2021     08/13/2021    CO2 28 08/13/2021      Lab Results   Component Value Date    WBC 4.9 08/13/2021    HGB 13.1 08/13/2021    HCT 40.2 08/13/2021    MCV 96 08/13/2021     08/13/2021      Lab Results   Component Value Date    TSH 4.39 03/19/2011          Cristiano Madrid MD [facility]  Non-Invasive Cardiologist  [facility]  Pager [phone]      Thank you for allowing me to participate in the care of your patient.      Sincerely,     Cristiano Madrid MD     [facility]  cc:   Magy Franco, APRN CNP  [address] [place] [address]

## 2023-01-31 ENCOUNTER — OFFICE VISIT (OUTPATIENT)
Dept: FAMILY MEDICINE | Facility: CLINIC | Age: 53
End: 2023-01-31
Payer: COMMERCIAL

## 2023-01-31 ENCOUNTER — ANCILLARY PROCEDURE (OUTPATIENT)
Dept: GENERAL RADIOLOGY | Facility: CLINIC | Age: 53
End: 2023-01-31
Attending: NURSE PRACTITIONER
Payer: COMMERCIAL

## 2023-01-31 VITALS
HEART RATE: 44 BPM | DIASTOLIC BLOOD PRESSURE: 78 MMHG | WEIGHT: 151.5 LBS | RESPIRATION RATE: 16 BRPM | HEIGHT: 68 IN | BODY MASS INDEX: 22.96 KG/M2 | OXYGEN SATURATION: 99 % | SYSTOLIC BLOOD PRESSURE: 118 MMHG | TEMPERATURE: 97.6 F

## 2023-01-31 DIAGNOSIS — R10.10 UPPER ABDOMINAL PAIN: Primary | ICD-10-CM

## 2023-01-31 DIAGNOSIS — R10.10 UPPER ABDOMINAL PAIN: ICD-10-CM

## 2023-01-31 LAB
ALBUMIN SERPL BCG-MCNC: 4.2 G/DL (ref 3.5–5.2)
ALP SERPL-CCNC: 56 U/L (ref 35–104)
ALT SERPL W P-5'-P-CCNC: 16 U/L (ref 10–35)
ANION GAP SERPL CALCULATED.3IONS-SCNC: 10 MMOL/L (ref 7–15)
AST SERPL W P-5'-P-CCNC: 13 U/L (ref 10–35)
BILIRUB SERPL-MCNC: 0.4 MG/DL
BUN SERPL-MCNC: 15 MG/DL (ref 6–20)
CALCIUM SERPL-MCNC: 8.7 MG/DL (ref 8.6–10)
CHLORIDE SERPL-SCNC: 104 MMOL/L (ref 98–107)
CREAT SERPL-MCNC: 0.92 MG/DL (ref 0.51–0.95)
DEPRECATED HCO3 PLAS-SCNC: 24 MMOL/L (ref 22–29)
ERYTHROCYTE [DISTWIDTH] IN BLOOD BY AUTOMATED COUNT: 11.9 % (ref 10–15)
GFR SERPL CREATININE-BSD FRML MDRD: 75 ML/MIN/1.73M2
GLUCOSE SERPL-MCNC: 76 MG/DL (ref 70–99)
HCT VFR BLD AUTO: 42.3 % (ref 35–47)
HGB BLD-MCNC: 13.8 G/DL (ref 11.7–15.7)
MCH RBC QN AUTO: 31 PG (ref 26.5–33)
MCHC RBC AUTO-ENTMCNC: 32.6 G/DL (ref 31.5–36.5)
MCV RBC AUTO: 95 FL (ref 78–100)
PLATELET # BLD AUTO: 280 10E3/UL (ref 150–450)
POTASSIUM SERPL-SCNC: 3.9 MMOL/L (ref 3.4–5.3)
PROT SERPL-MCNC: 6.7 G/DL (ref 6.4–8.3)
RBC # BLD AUTO: 4.45 10E6/UL (ref 3.8–5.2)
SODIUM SERPL-SCNC: 138 MMOL/L (ref 136–145)
WBC # BLD AUTO: 5.5 10E3/UL (ref 4–11)

## 2023-01-31 PROCEDURE — 85027 COMPLETE CBC AUTOMATED: CPT | Performed by: NURSE PRACTITIONER

## 2023-01-31 PROCEDURE — 74019 RADEX ABDOMEN 2 VIEWS: CPT | Mod: TC | Performed by: RADIOLOGY

## 2023-01-31 PROCEDURE — 99214 OFFICE O/P EST MOD 30 MIN: CPT | Performed by: NURSE PRACTITIONER

## 2023-01-31 PROCEDURE — 36415 COLL VENOUS BLD VENIPUNCTURE: CPT | Performed by: NURSE PRACTITIONER

## 2023-01-31 PROCEDURE — 80053 COMPREHEN METABOLIC PANEL: CPT | Performed by: NURSE PRACTITIONER

## 2023-01-31 ASSESSMENT — PAIN SCALES - GENERAL: PAINLEVEL: NO PAIN (0)

## 2023-01-31 NOTE — PROGRESS NOTES
HPI: Patient presents to the clinic for a complaint of abdominal pressure. The patient reports that the pressure started approximately 3-4 weeks ago. The location of the discomfort is across her abdomen under her rib cage. The pressure is constant and nothing makes it better or worse. She reports increased gas and bowel movements recently. She denies changes to her stool consistency, diet or hydration, although notes she often does not drink enough water. She reports increased stress in her life recently.    PMH:  Migraine  Seasonal allergic rhinitis  GERD  Iron deficiency anemia    Surgical History:  Breast augmentation    Recent illness: None    Family History:  Colon cancer - Grandfather (50s), Mother had non-cancerous polyps at 40.    Social History:  Tobacco use - none    Medications:  Iron by mouth twice daily  Fexofenadine 180 mg daily as needed  Fluocinonide 0.05% external solution twice daily as needed to scalp  Metoprolol ER 25 mg daily  Oxymetazoline nasal spray as needed  Sumatriptan 25 mg as needed at onset of headache    Allergies: NKA    ROS  GEN: denies fever, chill or weight loss  RESP: denies cough, wheezing, or shortness of breath  CV: denies palpitations, chest pain and edema  GI: denies vomiting, diarrhea and constipation  PSYCHIATRIC/BEHAVIORAL: positive for increased stress    Objective  VS: Temp 97.6F, Pulse 44, /78, Resp 16, SpO2 99% on room air BMI 23.38  GEN: alert & oriented, in no acute distress  RESP: lungs clear bilaterally on auscultation, no rales, rhonchi or wheezes  CV: S1 & S2 present with no murmurs, rubs or gallops  ABD: soft, non-distended, non-tender, bowel sounds present in all four quadrants. No masses or splenomegaly are noted.  PSYCH: appropriate mood and affect    Colonoscopy 2016 - normal  EGD 2016 - normal    Abdominal xray shows increased gas & stool burden.    Assessment  1. Upper abdominal pain  Uncontrolled  Differential diagnosis: constipation, stress-induced  abdominal pain    Plan  1. Upper abdominal pain due to constipation  Abdominal xray completed today  Check CMP and CBC to rule out other etiologies  Begin Miralax once daily for 2 weeks to relieve stool burden. May decrease use to every other day if stool becomes loose.   Be sure to stay hydrated when taking Miralax.    Sahara Sukhjinder on 1/31/2023 at 10:07 AM  Nurse Practitioner Student

## 2023-01-31 NOTE — PROGRESS NOTES
Assessment & Plan     Upper abdominal pain  Etiology unclear.  GERD vs constipation vs IBS vs stress vs PUD vs biliary disease vs other.  Labs today normal.  Xray showing large amount of stool.  Advised patient to use Miralax daily until constipation is cleared. If pain resolves, no further work up needed. If pain unresolved with Miralax, will need CT.  - XR Abdomen 2 Views; Future  - CBC with platelets; Future  - Comprehensive metabolic panel (BMP + Alb, Alk Phos, ALT, AST, Total. Bili, TP); Future  - Comprehensive metabolic panel (BMP + Alb, Alk Phos, ALT, AST, Total. Bili, TP)  - CBC with platelets      The risks, benefits and treatment options of prescribed medications or other treatments have been discussed with the patient. The patient verbalized their understanding and should call or follow up if no improvement or if they develop further problems.    [facility] is a 52 year old, presenting for the following health issues:  Abdominal Pain      History of Present Illness       Reason for visit:  Abdominal discomfort  Symptom onset:  3-4 weeks ago  Symptoms include:  Pressure at the bottom of my rib cage  Symptom intensity:  Mild  Symptom progression:  Staying the same  Had these symptoms before:  No  What makes it worse:  No  What makes it better:  No    She eats 2-3 servings of fruits and vegetables daily.She consumes 1 sweetened beverage(s) daily.She exercises with enough effort to increase her heart rate 20 to 29 minutes per day.  She exercises with enough effort to increase her heart rate 5 days per week.   She is taking medications regularly.       Patient is complaining of 3 weeks of abdominal pressure.  It is located across the top of her abdomen, under her bilateral rib cage.  Denies pain.  Sensation is constant.  Nothing makes it better or worse.  It is not affected by eating or by bowel movements.  Her appetite is  "normal.  She is tolerating regular diet.  She feels more bloated and has more gas than normal.  She has history of constipation but feels she has been stooling more than normal.  Denies diarrhea.  Denies weight loss.  Denies nausea or vomiting.  Denies acid reflux.  Has not tried any treatments at home.  Has had a lot of family stress recently - unsure if this is related.      Normal colonoscopy in 2016  EGD 2016 - normal.      Wt Readings from Last 4 Encounters:   01/31/23 68.7 kg (151 lb 8 oz)   01/27/23 68.5 kg (151 lb)   12/21/22 70.3 kg (155 lb)   10/24/22 67.6 kg (149 lb)               Review of Systems   Constitutional, HEENT, cardiovascular, pulmonary, gi and gu systems are negative, except as otherwise noted.      Objective    /78 (BP Location: Right arm, Patient Position: Sitting, Cuff Size: Adult Regular)   Pulse (!) 44   Temp 97.6  F (36.4  C) (Tympanic)   Resp 16   Ht 1.715 m (5' 7.5\")   Wt 68.7 kg (151 lb 8 oz)   SpO2 99%   BMI 23.38 kg/m    Body mass index is 23.38 kg/m .  Physical Exam   GENERAL: healthy, alert and no distress  NECK: no adenopathy, no asymmetry, masses, or scars and thyroid normal to palpation  RESP: lungs clear to auscultation - no rales, rhonchi or wheezes  CV: regular rate and rhythm, normal S1 S2, no S3 or S4, no murmur, click or rub, no peripheral edema and peripheral pulses strong  ABDOMEN: soft, nontender, no hepatosplenomegaly, no masses and bowel sounds normal  MS: no gross musculoskeletal defects noted, no edema    Xray - Reviewed and interpreted by me.  Moderate to large amts of stool. No air fluid levels    Results for orders placed or performed in visit on 01/31/23   Comprehensive metabolic panel (BMP + Alb, Alk Phos, ALT, AST, Total. Bili, TP)     Status: Normal   Result Value Ref Range    Sodium 138 136 - 145 mmol/L    Potassium 3.9 3.4 - 5.3 mmol/L    Chloride 104 98 - 107 mmol/L    Carbon Dioxide (CO2) 24 22 - 29 mmol/L    Anion Gap 10 7 - 15 mmol/L    " Urea Nitrogen 15.0 6.0 - 20.0 mg/dL    Creatinine 0.92 0.51 - 0.95 mg/dL    Calcium 8.7 8.6 - 10.0 mg/dL    Glucose 76 70 - 99 mg/dL    Alkaline Phosphatase 56 35 - 104 U/L    AST 13 10 - 35 U/L    ALT 16 10 - 35 U/L    Protein Total 6.7 6.4 - 8.3 g/dL    Albumin 4.2 3.5 - 5.2 g/dL    Bilirubin Total 0.4 <=1.2 mg/dL    GFR Estimate 75 >60 mL/min/1.73m2   CBC with platelets     Status: Normal   Result Value Ref Range    WBC Count 5.5 4.0 - 11.0 10e3/uL    RBC Count 4.45 3.80 - 5.20 10e6/uL    Hemoglobin 13.8 11.7 - 15.7 g/dL    Hematocrit 42.3 35.0 - 47.0 %    MCV 95 78 - 100 fL    MCH 31.0 26.5 - 33.0 pg    MCHC 32.6 31.5 - 36.5 g/dL    RDW 11.9 10.0 - 15.0 %    Platelet Count 280 150 - 450 10e3/uL

## 2023-02-10 ENCOUNTER — HOSPITAL ENCOUNTER (OUTPATIENT)
Dept: CARDIOLOGY | Facility: CLINIC | Age: 53
Discharge: HOME | End: 2023-02-10
Attending: GENERAL ACUTE CARE HOSPITAL | Admitting: GENERAL ACUTE CARE HOSPITAL
Payer: COMMERCIAL

## 2023-02-10 DIAGNOSIS — I49.3 PVC (PREMATURE VENTRICULAR CONTRACTION): ICD-10-CM

## 2023-02-10 DIAGNOSIS — I47.20 VENTRICULAR TACHYCARDIA (H): ICD-10-CM

## 2023-02-10 PROCEDURE — 93227 XTRNL ECG REC<48 HR R&I: CPT | Performed by: INTERNAL MEDICINE

## 2023-02-10 PROCEDURE — 93225 XTRNL ECG REC<48 HRS REC: CPT

## 2023-02-16 ENCOUNTER — MYC MEDICAL ADVICE (OUTPATIENT)
Dept: FAMILY MEDICINE | Facility: CLINIC | Age: 53
End: 2023-02-16
Payer: COMMERCIAL

## 2023-02-16 DIAGNOSIS — R10.84 ABDOMINAL PAIN, GENERALIZED: Primary | ICD-10-CM

## 2023-02-16 NOTE — TELEPHONE ENCOUNTER
"Forwarding [employer]hart post-visit update to PCP to advise please.    Visit note from 1/31/23 is pasted below.  Is there another laxative you'd recommend at this point, or revisit?    Sofie Fowler RN  [facility]    \"Plan  1. Upper abdominal pain due to constipation  Abdominal xray completed today  Check CMP and CBC to rule out other etiologies  Begin Miralax once daily for 2 weeks to relieve stool burden. May decrease use to every other day if stool becomes loose.   Be sure to stay hydrated when taking Miralax.     Sahara Sukhjinder on 1/31/2023 at 10:07 AM  Nurse Practitioner Student\"  "

## 2023-02-20 DIAGNOSIS — I49.3 PVC (PREMATURE VENTRICULAR CONTRACTION): Primary | ICD-10-CM

## 2023-02-20 DIAGNOSIS — I47.20 VENTRICULAR TACHYCARDIA (H): ICD-10-CM

## 2023-02-20 RX ORDER — METOPROLOL SUCCINATE 25 MG/1
50 TABLET, EXTENDED RELEASE ORAL DAILY
Qty: 180 TABLET | Refills: 3 | Status: SHIPPED | OUTPATIENT
Start: 2023-02-20 | End: 2024-03-08

## 2023-02-20 NOTE — PROGRESS NOTES
Follow-up EP ordered.  Metoprolol rx dose updated.  -rah    ===View-only below this line===  ----- Message -----  From: Cristiano Madrid MD  Sent: 2/19/2023  12:51 PM CST  To: Micheline Reeder RN    PVCs are still very frequent. I would increase metoprolol succinate to 50 mg daily and I think we should get her in to see EP.    ThanksCristiano

## 2023-02-23 ENCOUNTER — HOSPITAL ENCOUNTER (OUTPATIENT)
Dept: CARDIOLOGY | Facility: HOSPITAL | Age: 53
Discharge: HOME | End: 2023-02-23
Attending: GENERAL ACUTE CARE HOSPITAL
Payer: COMMERCIAL

## 2023-02-23 ENCOUNTER — HOSPITAL ENCOUNTER (OUTPATIENT)
Dept: MRI IMAGING | Facility: HOSPITAL | Age: 53
Discharge: HOME | End: 2023-02-23
Attending: GENERAL ACUTE CARE HOSPITAL
Payer: COMMERCIAL

## 2023-02-23 VITALS — SYSTOLIC BLOOD PRESSURE: 116 MMHG | DIASTOLIC BLOOD PRESSURE: 72 MMHG | OXYGEN SATURATION: 100 % | HEART RATE: 65 BPM

## 2023-02-23 DIAGNOSIS — I47.20 VENTRICULAR TACHYCARDIA (H): ICD-10-CM

## 2023-02-23 DIAGNOSIS — I49.3 PVC (PREMATURE VENTRICULAR CONTRACTION): ICD-10-CM

## 2023-02-23 LAB — LVEF ECHO: NORMAL

## 2023-02-23 PROCEDURE — 93005 ELECTROCARDIOGRAM TRACING: CPT

## 2023-02-23 PROCEDURE — A9585 GADOBUTROL INJECTION: HCPCS | Performed by: GENERAL ACUTE CARE HOSPITAL

## 2023-02-23 PROCEDURE — 75563 CARD MRI W/STRESS IMG & DYE: CPT | Mod: 26 | Performed by: INTERNAL MEDICINE

## 2023-02-23 PROCEDURE — 93010 ELECTROCARDIOGRAM REPORT: CPT | Performed by: INTERNAL MEDICINE

## 2023-02-23 PROCEDURE — 93306 TTE W/DOPPLER COMPLETE: CPT

## 2023-02-23 PROCEDURE — 75563 CARD MRI W/STRESS IMG & DYE: CPT

## 2023-02-23 PROCEDURE — 250N000011 HC RX IP 250 OP 636: Performed by: GENERAL ACUTE CARE HOSPITAL

## 2023-02-23 PROCEDURE — 999N000122 MR MYOCARDIUM  OVERREAD

## 2023-02-23 PROCEDURE — 255N000002 HC RX 255 OP 636: Performed by: GENERAL ACUTE CARE HOSPITAL

## 2023-02-23 PROCEDURE — 93016 CV STRESS TEST SUPVJ ONLY: CPT | Performed by: INTERNAL MEDICINE

## 2023-02-23 PROCEDURE — 93018 CV STRESS TEST I&R ONLY: CPT | Performed by: INTERNAL MEDICINE

## 2023-02-23 PROCEDURE — 93306 TTE W/DOPPLER COMPLETE: CPT | Mod: 26 | Performed by: INTERNAL MEDICINE

## 2023-02-23 RX ORDER — REGADENOSON 0.08 MG/ML
0.4 INJECTION, SOLUTION INTRAVENOUS ONCE
Status: COMPLETED | OUTPATIENT
Start: 2023-02-23 | End: 2023-02-23

## 2023-02-23 RX ORDER — AMINOPHYLLINE 25 MG/ML
50 INJECTION, SOLUTION INTRAVENOUS
Status: DISCONTINUED | OUTPATIENT
Start: 2023-02-23 | End: 2023-02-23 | Stop reason: HOSPADM

## 2023-02-23 RX ORDER — CAFFEINE 200 MG
200 TABLET ORAL
Status: DISCONTINUED | OUTPATIENT
Start: 2023-02-23 | End: 2023-02-23 | Stop reason: HOSPADM

## 2023-02-23 RX ORDER — CAFFEINE CITRATE 20 MG/ML
60 SOLUTION INTRAVENOUS
Status: DISCONTINUED | OUTPATIENT
Start: 2023-02-23 | End: 2023-02-23 | Stop reason: HOSPADM

## 2023-02-23 RX ORDER — GADOBUTROL 604.72 MG/ML
16 INJECTION INTRAVENOUS ONCE
Status: COMPLETED | OUTPATIENT
Start: 2023-02-23 | End: 2023-02-23

## 2023-02-23 RX ORDER — ALBUTEROL SULFATE 0.83 MG/ML
2.5 SOLUTION RESPIRATORY (INHALATION)
Status: DISCONTINUED | OUTPATIENT
Start: 2023-02-23 | End: 2023-02-23 | Stop reason: HOSPADM

## 2023-02-23 RX ADMIN — REGADENOSON 0.4 MG: 0.08 INJECTION, SOLUTION INTRAVENOUS at 10:11

## 2023-02-23 RX ADMIN — GADOBUTROL 16 ML: 604.72 INJECTION INTRAVENOUS at 10:25

## 2023-02-24 LAB
ATRIAL RATE - MUSE: 55 BPM
ATRIAL RATE - MUSE: 56 BPM
DIASTOLIC BLOOD PRESSURE - MUSE: NORMAL MMHG
DIASTOLIC BLOOD PRESSURE - MUSE: NORMAL MMHG
INTERPRETATION ECG - MUSE: NORMAL
INTERPRETATION ECG - MUSE: NORMAL
P AXIS - MUSE: 13 DEGREES
P AXIS - MUSE: 82 DEGREES
PR INTERVAL - MUSE: 130 MS
PR INTERVAL - MUSE: 132 MS
QRS DURATION - MUSE: 84 MS
QRS DURATION - MUSE: 86 MS
QT - MUSE: 418 MS
QT - MUSE: 430 MS
QTC - MUSE: 399 MS
QTC - MUSE: 414 MS
R AXIS - MUSE: 57 DEGREES
R AXIS - MUSE: 83 DEGREES
SYSTOLIC BLOOD PRESSURE - MUSE: NORMAL MMHG
SYSTOLIC BLOOD PRESSURE - MUSE: NORMAL MMHG
T AXIS - MUSE: 59 DEGREES
T AXIS - MUSE: 69 DEGREES
VENTRICULAR RATE- MUSE: 55 BPM
VENTRICULAR RATE- MUSE: 56 BPM

## 2023-02-28 ENCOUNTER — VIRTUAL VISIT (OUTPATIENT)
Dept: CARDIOLOGY | Facility: CLINIC | Age: 53
End: 2023-02-28
Payer: COMMERCIAL

## 2023-02-28 ENCOUNTER — HOSPITAL ENCOUNTER (OUTPATIENT)
Dept: CT IMAGING | Facility: CLINIC | Age: 53
Discharge: HOME | End: 2023-02-28
Attending: NURSE PRACTITIONER | Admitting: NURSE PRACTITIONER
Payer: COMMERCIAL

## 2023-02-28 DIAGNOSIS — I49.3 PVC (PREMATURE VENTRICULAR CONTRACTION): ICD-10-CM

## 2023-02-28 DIAGNOSIS — R10.84 ABDOMINAL PAIN, GENERALIZED: ICD-10-CM

## 2023-02-28 PROCEDURE — 99204 OFFICE O/P NEW MOD 45 MIN: CPT | Mod: 95 | Performed by: INTERNAL MEDICINE

## 2023-02-28 PROCEDURE — 250N000011 HC RX IP 250 OP 636: Performed by: NURSE PRACTITIONER

## 2023-02-28 PROCEDURE — 250N000009 HC RX 250: Performed by: NURSE PRACTITIONER

## 2023-02-28 PROCEDURE — 74177 CT ABD & PELVIS W/CONTRAST: CPT

## 2023-02-28 RX ORDER — IOPAMIDOL 755 MG/ML
67 INJECTION, SOLUTION INTRAVASCULAR ONCE
Status: COMPLETED | OUTPATIENT
Start: 2023-02-28 | End: 2023-02-28

## 2023-02-28 RX ADMIN — IOPAMIDOL 67 ML: 755 INJECTION, SOLUTION INTRAVENOUS at 08:07

## 2023-02-28 RX ADMIN — SODIUM CHLORIDE 58 ML: 9 INJECTION, SOLUTION INTRAVENOUS at 08:07

## 2023-02-28 NOTE — LETTER
2/28/2023    Magy Franco, APRN CNP  [address] [address]    RE: Kalina DEANGELO Schreiber       Dear Colleague,     I had the pleasure of seeing Kalina Schreiber in the [facility].  Electrophysiology/ Virtual Clinic Note         H&P and Plan:   Patient opted to have a virtual visit due to ongoing issues related to ongoing COVID-19 virus pandemic and to minimize social interaction (based on CDC guidelines).      Visit details:  Patient has given verbal consent for this visit.    Interview was done talking and seeing patient via Internet.    Mode of transmission: [employer].  Visit start time: 11:15 AM  Visit stop time: 11:31 AM  Provider location: Office.  Patient location: Home.       REASON FOR VISIT: Electrophysiology evaluation.      HISTORY OF PRESENT ILLNESS: Patient is a pleasant 52-year-old lady with a history of migraines, who is here for evaluation of frequent PVCs.    Patient informs that she was found to have frequent PVCs as an incidental finding.  She was noticed to have slow heart rates, and a monitor confirmed PVCs.      She was recent evaluated by cardiology and a monitor showed elevated PVC burden compared to previous monitor done in 2010 (from 10% to 31% burden).  She was started on beta-blockers and a repeat a monitor showed partial improvement in PVC burden (23%).  She was then referred here for further evaluation.    At the moment, she seems to be doing well.  She seems to be completely asymptomatic with PVCs.  She denies any symptoms such as chest pain, shortness of breath, lightheadedness, palpitations, near-syncope or syncope.    PREVIOUS STUDIES (personally reviewed):  -ECG/PVC: LBBB pattern and inferior axis with transition at V3  -Cardiac MRI (3/23/2023): Negative for ischemia.  EF of 55-60%.  No scar or infiltrative process.  -Holter (2/20/2023): 23% PVC burden.  -Zio patch monitor (12/22/2022): 31% PVC burden.   -Holter (2010): 11% PVC burden       ASSESSMENT AND PLAN:   1.  Increasing PVC burden.  PVC morphology suggestive of outflow tract origin.  Patient seems to be asymptomatic, and cardiac MRI showed structurally normal heart and rule out ischemia.  I recommend the following:  -Continue therapy with metoprolol.  -Follow-up in clinic in 6 months.  At that time we will reevaluate PVC burden/symptoms.       Joshua Avery MD    Physical Exam:  Vitals: There were no vitals taken for this visit.    Constitutional:  Pt is in no acute distress.  Normal body habitus, upright.  HEAD: Normocephalic. No evidence of injury or laceration.   SKIN: Skin normal color with no lesions or eruptions. No xanthelasma.  ENT/Mouth:  Membranes moist. No nasal discharge or bleeding gums.   Neck:  Supple, normal JVP, no evidence of thyromegaly.  Chest/Lungs: No audible wheezing or labored breathing. Normal chest wall expansion. No cough.   Cardiovascular: Normal jugular venous pressure. No evidence of pitting edema bilaterally.   Abdomen: No evidence of abdominal distention. No observed jaundice.  Eyes: PERRL, EOMI. No scleral icterus, normal conjunctivae.  Extremities:  No lower extremity edema noticed.  No cyanosis or clubbing noted.   Neurologic: Normal arm motion bilateral.  No tremors. No evidence of focal defect.   Psychiatric: Alert and oriented x3, calm.         CURRENT MEDICATIONS:  Current Outpatient Medications   Medication Sig Dispense Refill     ferrous sulfate (IRON) 325 (65 FE) MG tablet Take 1 tablet (325 mg) by mouth 2 times daily 180 tablet 3     fexofenadine (ALLEGRA) 180 MG tablet Take 1 tablet (180 mg) by mouth daily (Patient taking differently: Take 180 mg by mouth daily as needed) 30 tablet 0     fluocinonide (LIDEX) 0.05 % external solution Apply twice daily as needed to frontal scalp. 60 mL 9     ibuprofen (ADVIL/MOTRIN) 200 MG capsule Take 200 mg by mouth every 4 hours as needed       metoprolol succinate ER (TOPROL XL) 25 MG 24 hr tablet Take 2 tablets  (50 mg) by mouth daily 180 tablet 3     Oxymetazoline HCl (NASAL SPRAY NA) Nasoquort 1 spray in each nostril daily when needed       SUMAtriptan (IMITREX) 25 MG tablet Take 1-2 tablets (25-50 mg) by mouth at onset of headache for migraine May repeat dose in 2 hours.  Do not exceed 200 mg in 24 hours 18 tablet 11     UNKNOWN TO PATIENT Allergy Eye Drops, 1 drop in each eye daily PRN       medroxyPROGESTERone (PROVERA) 10 MG tablet Can take up to 2 pills, three times a day for 7 days for heavy bleeding. (Patient not taking: Reported on 2/28/2023) 42 tablet 3       ALLERGIES     Allergies   Allergen Reactions     Nkda [No Known Drug Allergies]        PAST MEDICAL HISTORY:  Past Medical History:   Diagnosis Date     Allergic rhinitis due to other allergen      Basal cell carcinoma      Solitary cyst of breast      Twin pregnancy, delivered        PAST SURGICAL HISTORY:  Past Surgical History:   Procedure Laterality Date     ABDOMEN SURGERY  6/1995 12/2005    C-sections     BREAST SURGERY  10/2002    Augmentation     COLONOSCOPY  02/23/2012    Procedure:COLONOSCOPY; Colonoscopy  ; Surgeon:BOB TY; Location:[facility]     COLONOSCOPY N/A 05/06/2016    Procedure: COLONOSCOPY;  Surgeon: Markus Grossman MD;  Location: [facility]     COSMETIC SURGERY      Breast augmentation     ESOPHAGOSCOPY, GASTROSCOPY, DUODENOSCOPY (EGD), COMBINED N/A 05/06/2016    Procedure: COMBINED ESOPHAGOSCOPY, GASTROSCOPY, DUODENOSCOPY (EGD);  Surgeon: Markus Grossman MD;  Location: [facility]     LASER SURGERY OF EYE  08/21/2008    Both eyes     MAMMOPLASTY AUGMENTATION       SURGICAL HISTORY OF -       T&A     SURGICAL HISTORY OF -       Csection     SURGICAL HISTORY OF -       breast augmentation       FAMILY HISTORY:  Family History   Problem Relation Age of Onset     Alcohol/Drug Mother      Cancer Mother         skin, COD: lung, liver, bone cancer     Melanoma Mother      Diabetes Mother         Post transplant onset     Other Cancer  Mother         Lung, liver, bone     Alcohol/Drug Father      Hypertension Father      Cancer Maternal Grandmother         lung/liver     Asthma Maternal Grandmother      Other Cancer Maternal Grandmother         Lung, liver, bone     Cancer Maternal Grandfather         skin     Cancer - colorectal Maternal Grandfather      Melanoma Maternal Grandfather      Colon Cancer Maternal Grandfather      Coronary Artery Disease Maternal Grandfather      Cerebrovascular Disease No family hx of      Breast Cancer No family hx of      Prostate Cancer No family hx of        SOCIAL HISTORY:  Social History     Socioeconomic History     Marital status:      Spouse name: None     Number of children: None     Years of education: None     Highest education level: None   Occupational History     Employer: SELF   Tobacco Use     Smoking status: Never     Smokeless tobacco: Never   Vaping Use     Vaping Use: Never used   Substance and Sexual Activity     Alcohol use: Yes     Comment: 0-2 drinks per day     Drug use: No     Sexual activity: Yes     Partners: Male     Birth control/protection: Male Surgical     Comment: vas   Other Topics Concern     Parent/sibling w/ CABG, MI or angioplasty before 65F 55M? 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      Surrogate for twins       Review of Systems:  Skin:        Eyes:       ENT:       Respiratory:       Cardiovascular:       Gastroenterology:      Genitourinary:       Musculoskeletal:       Neurologic:       Psychiatric:       Heme/Lymph/Imm:       Endocrine:           Recent Lab Results:  LIPID RESULTS:  Lab Results   Component Value Date    CHOL 195 10/14/2022    CHOL 201 (H) 04/07/2016    HDL 82 10/14/2022    HDL 94 04/07/2016    LDL 99 10/14/2022    LDL 98 04/07/2016    TRIG 72 10/14/2022    TRIG 45 04/07/2016    CHOLHDLRATIO 2.0 03/19/2011       LIVER ENZYME RESULTS:  Lab Results   Component Value Date    AST 13 01/31/2023    AST 22  11/22/2010    ALT 16 01/31/2023    ALT 19 11/22/2010       CBC RESULTS:  Lab Results   Component Value Date    WBC 5.5 01/31/2023    WBC 4.2 04/07/2016    RBC 4.45 01/31/2023    RBC 4.44 04/07/2016    HGB 13.8 01/31/2023    HGB 13.2 08/15/2016    HCT 42.3 01/31/2023    HCT 33.2 (L) 04/07/2016    MCV 95 01/31/2023    MCV 75 (L) 04/07/2016    MCH 31.0 01/31/2023    MCH 23.0 (L) 04/07/2016    MCHC 32.6 01/31/2023    MCHC 30.7 (L) 04/07/2016    RDW 11.9 01/31/2023    RDW 16.6 (H) 04/07/2016     01/31/2023     04/07/2016       BMP RESULTS:  Lab Results   Component Value Date     01/31/2023     11/22/2010    POTASSIUM 3.9 01/31/2023    POTASSIUM 3.7 08/13/2021    POTASSIUM 3.4 11/22/2010    CHLORIDE 104 01/31/2023    CHLORIDE 105 08/13/2021    CHLORIDE 103 11/22/2010    CO2 24 01/31/2023    CO2 28 08/13/2021    CO2 24 11/22/2010    ANIONGAP 10 01/31/2023    ANIONGAP 6 08/13/2021    ANIONGAP 11 11/22/2010    GLC 76 01/31/2023    GLC 60 (L) 08/13/2021    GLC 85 03/19/2011    BUN 15.0 01/31/2023    BUN 14 08/13/2021    BUN 14 11/22/2010    CR 0.92 01/31/2023    CR 0.98 11/22/2010    GFRESTIMATED 75 01/31/2023    GFRESTIMATED 63 11/22/2010    GFRESTBLACK 76 11/22/2010    AUSTEN 8.7 01/31/2023    AUSTEN 9.5 11/22/2010        A1C RESULTS:  No results found for: A1C    INR RESULTS:  No results found for: INR      ECHOCARDIOGRAM  No results found for this or any previous visit (from the past 8760 hour(s)).      No orders of the defined types were placed in this encounter.    No orders of the defined types were placed in this encounter.    There are no discontinued medications.      Encounter Diagnosis   Name Primary?     PVC (premature ventricular contraction)          CC  Cristiano Madrid MD  [address]  [place] [address]            Kalina is a 52 year old who is being evaluated via a billable video visit.      How would you like to obtain your AVS? MyChart  If the video visit is dropped, the  "invitation should be resent by: Text to cell phone: [phone]  Will anyone else be joining your video visit? No      Video-Visit Details    Type of service:  Video Visit     Originating Location (pt. Location): Home  istant Location (provider location):    Platform used for Video Visit: VeriTainer - Patient Reported  Systolic (Patient Reported): 123  Diastolic (Patient Reported): 76  Weight (Patient Reported): 68 kg (150 lb)  Height (Patient Reported): 171.5 cm (5' 7.5\")  BMI (Based on Pt Reported Ht/Wt): 23.15  Pulse (Patient Reported): 56    Yahaira Vila LPN      Thank you for allowing me to participate in the care of your patient.      Sincerely,     Joshua Avery MD     [facility]  cc:   Cristiano Madrid MD  [address]  [place] [address]        "

## 2023-02-28 NOTE — PROGRESS NOTES
Electrophysiology/ Virtual Clinic Note         H&P and Plan:   Patient opted to have a virtual visit due to ongoing issues related to ongoing COVID-19 virus pandemic and to minimize social interaction (based on CDC guidelines).      Visit details:  Patient has given verbal consent for this visit.    Interview was done talking and seeing patient via Internet.    Mode of transmission: [employer].  Visit start time: 11:15 AM  Visit stop time: 11:31 AM  Provider location: Office.  Patient location: Home.       REASON FOR VISIT: Electrophysiology evaluation.      HISTORY OF PRESENT ILLNESS: Patient is a pleasant 52-year-old lady with a history of migraines, who is here for evaluation of frequent PVCs.    Patient informs that she was found to have frequent PVCs as an incidental finding.  She was noticed to have slow heart rates, and a monitor confirmed PVCs.      She was recent evaluated by cardiology and a monitor showed elevated PVC burden compared to previous monitor done in 2010 (from 10% to 31% burden).  She was started on beta-blockers and a repeat a monitor showed partial improvement in PVC burden (23%).  She was then referred here for further evaluation.    At the moment, she seems to be doing well.  She seems to be completely asymptomatic with PVCs.  She denies any symptoms such as chest pain, shortness of breath, lightheadedness, palpitations, near-syncope or syncope.    PREVIOUS STUDIES (personally reviewed):  -ECG/PVC: LBBB pattern and inferior axis with transition at V3  -Cardiac MRI (3/23/2023): Negative for ischemia.  EF of 55-60%.  No scar or infiltrative process.  -Holter (2/20/2023): 23% PVC burden.  -Zio patch monitor (12/22/2022): 31% PVC burden.   -Holter (2010): 11% PVC burden      ASSESSMENT AND PLAN:   1.  Increasing PVC burden.  PVC morphology suggestive of outflow tract origin.  Patient seems to be asymptomatic, and cardiac MRI showed structurally normal heart and rule out ischemia.  I  recommend the following:  -Continue therapy with metoprolol.  -Follow-up in clinic in 6 months.  At that time we will reevaluate PVC burden/symptoms.       Joshua Avery MD    Physical Exam:  Vitals: There were no vitals taken for this visit.    Constitutional:  Pt is in no acute distress.  Normal body habitus, upright.  HEAD: Normocephalic. No evidence of injury or laceration.   SKIN: Skin normal color with no lesions or eruptions. No xanthelasma.  ENT/Mouth:  Membranes moist. No nasal discharge or bleeding gums.   Neck:  Supple, normal JVP, no evidence of thyromegaly.  Chest/Lungs: No audible wheezing or labored breathing. Normal chest wall expansion. No cough.   Cardiovascular: Normal jugular venous pressure. No evidence of pitting edema bilaterally.   Abdomen: No evidence of abdominal distention. No observed jaundice.  Eyes: PERRL, EOMI. No scleral icterus, normal conjunctivae.  Extremities:  No lower extremity edema noticed.  No cyanosis or clubbing noted.   Neurologic: Normal arm motion bilateral.  No tremors. No evidence of focal defect.   Psychiatric: Alert and oriented x3, calm.         CURRENT MEDICATIONS:  Current Outpatient Medications   Medication Sig Dispense Refill     ferrous sulfate (IRON) 325 (65 FE) MG tablet Take 1 tablet (325 mg) by mouth 2 times daily 180 tablet 3     fexofenadine (ALLEGRA) 180 MG tablet Take 1 tablet (180 mg) by mouth daily (Patient taking differently: Take 180 mg by mouth daily as needed) 30 tablet 0     fluocinonide (LIDEX) 0.05 % external solution Apply twice daily as needed to frontal scalp. 60 mL 9     ibuprofen (ADVIL/MOTRIN) 200 MG capsule Take 200 mg by mouth every 4 hours as needed       metoprolol succinate ER (TOPROL XL) 25 MG 24 hr tablet Take 2 tablets (50 mg) by mouth daily 180 tablet 3     Oxymetazoline HCl (NASAL SPRAY NA) Nasoquort 1 spray in each nostril daily when needed       SUMAtriptan (IMITREX) 25 MG tablet Take 1-2 tablets (25-50 mg) by mouth at onset  of headache for migraine May repeat dose in 2 hours.  Do not exceed 200 mg in 24 hours 18 tablet 11     UNKNOWN TO PATIENT Allergy Eye Drops, 1 drop in each eye daily PRN       medroxyPROGESTERone (PROVERA) 10 MG tablet Can take up to 2 pills, three times a day for 7 days for heavy bleeding. (Patient not taking: Reported on 2/28/2023) 42 tablet 3       ALLERGIES     Allergies   Allergen Reactions     Nkda [No Known Drug Allergies]        PAST MEDICAL HISTORY:  Past Medical History:   Diagnosis Date     Allergic rhinitis due to other allergen      Basal cell carcinoma      Solitary cyst of breast      Twin pregnancy, delivered        PAST SURGICAL HISTORY:  Past Surgical History:   Procedure Laterality Date     ABDOMEN SURGERY  6/1995 12/2005    C-sections     BREAST SURGERY  10/2002    Augmentation     COLONOSCOPY  02/23/2012    Procedure:COLONOSCOPY; Colonoscopy  ; Surgeon:BOB TY; Location:[facility]     COLONOSCOPY N/A 05/06/2016    Procedure: COLONOSCOPY;  Surgeon: Markus Grossman MD;  Location: [facility]     COSMETIC SURGERY      Breast augmentation     ESOPHAGOSCOPY, GASTROSCOPY, DUODENOSCOPY (EGD), COMBINED N/A 05/06/2016    Procedure: COMBINED ESOPHAGOSCOPY, GASTROSCOPY, DUODENOSCOPY (EGD);  Surgeon: Markus Grossman MD;  Location: [facility]     LASER SURGERY OF EYE  08/21/2008    Both eyes     MAMMOPLASTY AUGMENTATION       SURGICAL HISTORY OF -       T&A     SURGICAL HISTORY OF -       Csection     SURGICAL HISTORY OF -       breast augmentation       FAMILY HISTORY:  Family History   Problem Relation Age of Onset     Alcohol/Drug Mother      Cancer Mother         skin, COD: lung, liver, bone cancer     Melanoma Mother      Diabetes Mother         Post transplant onset     Other Cancer Mother         Lung, liver, bone     Alcohol/Drug Father      Hypertension Father      Cancer Maternal Grandmother         lung/liver     Asthma Maternal Grandmother      Other Cancer Maternal Grandmother          Lung, liver, bone     Cancer Maternal Grandfather         skin     Cancer - colorectal Maternal Grandfather      Melanoma Maternal Grandfather      Colon Cancer Maternal Grandfather      Coronary Artery Disease Maternal Grandfather      Cerebrovascular Disease No family hx of      Breast Cancer No family hx of      Prostate Cancer No family hx of        SOCIAL HISTORY:  Social History     Socioeconomic History     Marital status:      Spouse name: None     Number of children: None     Years of education: None     Highest education level: None   Occupational History     Employer: SELF   Tobacco Use     Smoking status: Never     Smokeless tobacco: Never   Vaping Use     Vaping Use: Never used   Substance and Sexual Activity     Alcohol use: Yes     Comment: 0-2 drinks per day     Drug use: No     Sexual activity: Yes     Partners: Male     Birth control/protection: Male Surgical     Comment: vas   Other Topics Concern     Parent/sibling w/ CABG, MI or angioplasty before 65F 55M? 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      Surrogate for twins       Review of Systems:  Skin:        Eyes:       ENT:       Respiratory:       Cardiovascular:       Gastroenterology:      Genitourinary:       Musculoskeletal:       Neurologic:       Psychiatric:       Heme/Lymph/Imm:       Endocrine:           Recent Lab Results:  LIPID RESULTS:  Lab Results   Component Value Date    CHOL 195 10/14/2022    CHOL 201 (H) 04/07/2016    HDL 82 10/14/2022    HDL 94 04/07/2016    LDL 99 10/14/2022    LDL 98 04/07/2016    TRIG 72 10/14/2022    TRIG 45 04/07/2016    CHOLHDLRATIO 2.0 03/19/2011       LIVER ENZYME RESULTS:  Lab Results   Component Value Date    AST 13 01/31/2023    AST 22 11/22/2010    ALT 16 01/31/2023    ALT 19 11/22/2010       CBC RESULTS:  Lab Results   Component Value Date    WBC 5.5 01/31/2023    WBC 4.2 04/07/2016    RBC 4.45 01/31/2023    RBC 4.44 04/07/2016    HGB 13.8  01/31/2023    HGB 13.2 08/15/2016    HCT 42.3 01/31/2023    HCT 33.2 (L) 04/07/2016    MCV 95 01/31/2023    MCV 75 (L) 04/07/2016    MCH 31.0 01/31/2023    MCH 23.0 (L) 04/07/2016    MCHC 32.6 01/31/2023    MCHC 30.7 (L) 04/07/2016    RDW 11.9 01/31/2023    RDW 16.6 (H) 04/07/2016     01/31/2023     04/07/2016       BMP RESULTS:  Lab Results   Component Value Date     01/31/2023     11/22/2010    POTASSIUM 3.9 01/31/2023    POTASSIUM 3.7 08/13/2021    POTASSIUM 3.4 11/22/2010    CHLORIDE 104 01/31/2023    CHLORIDE 105 08/13/2021    CHLORIDE 103 11/22/2010    CO2 24 01/31/2023    CO2 28 08/13/2021    CO2 24 11/22/2010    ANIONGAP 10 01/31/2023    ANIONGAP 6 08/13/2021    ANIONGAP 11 11/22/2010    GLC 76 01/31/2023    GLC 60 (L) 08/13/2021    GLC 85 03/19/2011    BUN 15.0 01/31/2023    BUN 14 08/13/2021    BUN 14 11/22/2010    CR 0.92 01/31/2023    CR 0.98 11/22/2010    GFRESTIMATED 75 01/31/2023    GFRESTIMATED 63 11/22/2010    GFRESTBLACK 76 11/22/2010    AUSTEN 8.7 01/31/2023    AUSTEN 9.5 11/22/2010        A1C RESULTS:  No results found for: A1C    INR RESULTS:  No results found for: INR      ECHOCARDIOGRAM  No results found for this or any previous visit (from the past 8760 hour(s)).      No orders of the defined types were placed in this encounter.    No orders of the defined types were placed in this encounter.    There are no discontinued medications.      Encounter Diagnosis   Name Primary?     PVC (premature ventricular contraction)          CC  Cristiano Madrid MD  [address] [facility] [address]  [place] [address]            Kalina is a 52 year old who is being evaluated via a billable video visit.      How would you like to obtain your AVS? Snap[employer]ious  If the video visit is dropped, the invitation should be resent by: Text to cell phone: [phone]  Will anyone else be joining your video visit? No      Video-Visit Details    Type of service:  Video Visit     Originating Location (pt.  "Location): Home  istant Location (provider location):    Platform used for Video Visit: Bo     Vitals - Patient Reported  Systolic (Patient Reported): 123  Diastolic (Patient Reported): 76  Weight (Patient Reported): 68 kg (150 lb)  Height (Patient Reported): 171.5 cm (5' 7.5\")  BMI (Based on Pt Reported Ht/Wt): 23.15  Pulse (Patient Reported): 56    Yahaira Vila LPN  "

## 2023-03-30 ENCOUNTER — ALLIED HEALTH/NURSE VISIT (OUTPATIENT)
Dept: FAMILY MEDICINE | Facility: CLINIC | Age: 53
End: 2023-03-30
Payer: COMMERCIAL

## 2023-03-30 ENCOUNTER — OFFICE VISIT (OUTPATIENT)
Dept: DERMATOLOGY | Facility: CLINIC | Age: 53
End: 2023-03-30
Payer: COMMERCIAL

## 2023-03-30 DIAGNOSIS — L81.4 LENTIGO: ICD-10-CM

## 2023-03-30 DIAGNOSIS — Z23 NEED FOR VACCINATION: Primary | ICD-10-CM

## 2023-03-30 DIAGNOSIS — D22.9 MULTIPLE BENIGN NEVI: ICD-10-CM

## 2023-03-30 DIAGNOSIS — L66.12 FRONTAL FIBROSING ALOPECIA: Primary | ICD-10-CM

## 2023-03-30 DIAGNOSIS — D18.01 CHERRY ANGIOMA: ICD-10-CM

## 2023-03-30 DIAGNOSIS — Z85.828 HISTORY OF BASAL CELL CARCINOMA: ICD-10-CM

## 2023-03-30 DIAGNOSIS — L57.0 ACTINIC KERATOSIS: ICD-10-CM

## 2023-03-30 DIAGNOSIS — L30.9 DERMATITIS: ICD-10-CM

## 2023-03-30 DIAGNOSIS — L82.1 SEBORRHEIC KERATOSIS: ICD-10-CM

## 2023-03-30 PROCEDURE — 11900 INJECT SKIN LESIONS </W 7: CPT | Mod: 59 | Performed by: PHYSICIAN ASSISTANT

## 2023-03-30 PROCEDURE — 99213 OFFICE O/P EST LOW 20 MIN: CPT | Mod: 25 | Performed by: PHYSICIAN ASSISTANT

## 2023-03-30 PROCEDURE — 90750 HZV VACC RECOMBINANT IM: CPT

## 2023-03-30 PROCEDURE — 90471 IMMUNIZATION ADMIN: CPT

## 2023-03-30 PROCEDURE — 99207 PR NO CHARGE NURSE ONLY: CPT

## 2023-03-30 PROCEDURE — 17000 DESTRUCT PREMALG LESION: CPT | Performed by: PHYSICIAN ASSISTANT

## 2023-03-30 RX ORDER — FLUTICASONE PROPIONATE 50 MCG
1 SPRAY, SUSPENSION (ML) NASAL DAILY
COMMUNITY

## 2023-03-30 RX ORDER — FLUOCINONIDE TOPICAL SOLUTION USP, 0.05% 0.5 MG/ML
SOLUTION TOPICAL
Qty: 60 ML | Refills: 9 | Status: SHIPPED | OUTPATIENT
Start: 2023-03-30 | End: 2024-04-11

## 2023-03-30 ASSESSMENT — PAIN SCALES - GENERAL: PAINLEVEL: NO PAIN (0)

## 2023-03-30 NOTE — PROGRESS NOTES
Kalina Schreiber is an extremely pleasant 52year old year old female patient here today for skin check. Scaly area on left cheek, present for months.  No painful or bleeding. She notes FFA, ok will get inflamed occasionally. She uses topical steroids as needed.  Patient has no other skin complaints today.  Remainder of the HPI, Meds, PMH, Allergies, FH, and SH was reviewed in chart.    Pertinent Hx:   History of BCC  Past Medical History:   Diagnosis Date     Allergic rhinitis due to other allergen      Basal cell carcinoma      Solitary cyst of breast      Twin pregnancy, delivered        Past Surgical History:   Procedure Laterality Date     ABDOMEN SURGERY  6/1995 12/2005    C-sections     BREAST SURGERY  10/2002    Augmentation     COLONOSCOPY  02/23/2012    Procedure:COLONOSCOPY; Colonoscopy  ; Surgeon:BOB TY; Location:[facility]     COLONOSCOPY N/A 05/06/2016    Procedure: COLONOSCOPY;  Surgeon: Markus Grossman MD;  Location: [facility]     COSMETIC SURGERY      Breast augmentation     ESOPHAGOSCOPY, GASTROSCOPY, DUODENOSCOPY (EGD), COMBINED N/A 05/06/2016    Procedure: COMBINED ESOPHAGOSCOPY, GASTROSCOPY, DUODENOSCOPY (EGD);  Surgeon: Markus Grossman MD;  Location: [facility]     LASER SURGERY OF EYE  08/21/2008    Both eyes     MAMMOPLASTY AUGMENTATION       SURGICAL HISTORY OF -       T&A     SURGICAL HISTORY OF -       Csection     SURGICAL HISTORY OF -       breast augmentation        Family History   Problem Relation Age of Onset     Alcohol/Drug Mother      Cancer Mother         skin, COD: lung, liver, bone cancer     Melanoma Mother      Diabetes Mother         Post transplant onset     Other Cancer Mother         Lung, liver, bone     Alcohol/Drug Father      Hypertension Father      Cancer Maternal Grandmother         lung/liver     Asthma Maternal Grandmother      Other Cancer Maternal Grandmother         Lung, liver, bone     Cancer Maternal Grandfather         skin     Cancer - colorectal  Maternal Grandfather      Melanoma Maternal Grandfather      Colon Cancer Maternal Grandfather      Coronary Artery Disease Maternal Grandfather      Cerebrovascular Disease No family hx of      Breast Cancer No family hx of      Prostate Cancer No family hx of        Social History     Socioeconomic History     Marital status:      Spouse name: Not on file     Number of children: Not on file     Years of education: Not on file     Highest education level: Not on file   Occupational History     Employer: SELF   Tobacco Use     Smoking status: Never     Smokeless tobacco: Never   Vaping Use     Vaping Use: Never used   Substance and Sexual Activity     Alcohol use: Yes     Comment: 0-2 drinks per day     Drug use: No     Sexual activity: Yes     Partners: Male     Birth control/protection: Male Surgical     Comment: vas   Other Topics Concern     Parent/sibling w/ CABG, MI or angioplasty before 65F 55M? 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      Surrogate for twins     Social Determinants of Health     Financial Resource Strain: Not on file   Food Insecurity: Not on file   Transportation Needs: Not on file   Physical Activity: Not on file   Stress: Not on file   Social Connections: Not on file   Intimate Partner Violence: Not on file   Housing Stability: Not on file       Outpatient Encounter Medications as of 3/30/2023   Medication Sig Dispense Refill     ferrous sulfate (IRON) 325 (65 FE) MG tablet Take 1 tablet (325 mg) by mouth 2 times daily 180 tablet 3     fluocinonide (LIDEX) 0.05 % external solution Apply twice daily as needed to frontal scalp. 60 mL 9     fluticasone (FLONASE) 50 MCG/ACT nasal spray Spray 1 spray into both nostrils daily       metoprolol succinate ER (TOPROL XL) 25 MG 24 hr tablet Take 2 tablets (50 mg) by mouth daily 180 tablet 3     fexofenadine (ALLEGRA) 180 MG tablet Take 1 tablet (180 mg) by mouth daily (Patient not taking: Reported  on 3/30/2023) 30 tablet 0     ibuprofen (ADVIL/MOTRIN) 200 MG capsule Take 200 mg by mouth every 4 hours as needed (Patient not taking: Reported on 3/30/2023)       medroxyPROGESTERone (PROVERA) 10 MG tablet Can take up to 2 pills, three times a day for 7 days for heavy bleeding. (Patient not taking: Reported on 2/28/2023) 42 tablet 3     Oxymetazoline HCl (NASAL SPRAY NA) Nasoquort 1 spray in each nostril daily when needed (Patient not taking: Reported on 3/30/2023)       SUMAtriptan (IMITREX) 25 MG tablet Take 1-2 tablets (25-50 mg) by mouth at onset of headache for migraine May repeat dose in 2 hours.  Do not exceed 200 mg in 24 hours (Patient not taking: Reported on 3/30/2023) 18 tablet 11     UNKNOWN TO PATIENT Allergy Eye Drops, 1 drop in each eye daily PRN (Patient not taking: Reported on 3/30/2023)       [DISCONTINUED] fluocinonide (LIDEX) 0.05 % external solution Apply twice daily as needed to frontal scalp. 60 mL 9     Facility-Administered Encounter Medications as of 3/30/2023   Medication Dose Route Frequency Provider Last Rate Last Admin     [COMPLETED] triamcinolone acetonide (KENALOG-10) injection 5 mg  5 mg Intra-Lesional Once Talisha Cleveland PA-C   5 mg at 03/30/23 1149             O:   NAD, WDWN, Alert & Oriented, Mood & Affect wnl, Vitals stable   Here today alone   There were no vitals taken for this visit.   General appearance normal   Vitals stable   Alert, oriented and in no acute distress     Scaly papule on left jaw   Mild erythematous scaly perifollicular papules on frontal scalp   Stuck on papules and brown macules on trunk and ext   Red papules on trunk  Brown papules and macules with regular pigment network and borders on torso and extremities     The remainder of skin exam is normal      Eyes: Conjunctivae/lids:Normal     ENT: Lips: normal    MSK:Normal    Cardiovascular: peripheral edema none    Pulm: Breathing Normal    Neuro/Psych: Orientation:Alert and Orientedx3 ;  Mood/Affect:normal   A/P:  1. Actinic keratosis on left jaw x 1  LN2:  Treated with LN2 for 5s for 1-2 cycles. Warned risks of blistering, pain, pigment change, scarring, and incomplete resolution.  Advised patient to return if lesions do not completely resolve.  Wound care sheet given.  2. Frontal fibrosing alopecia   IL TAC: PGACAC discussed.  Risks including but not limited to injection site reaction, bruising, no resolution.  All questions answered and entertained to patient s satisfaction.  Informed consent obtained.  IL TAC in concentration of 5 mg/ml was injected ID to frontal fibrosing alopecia.  Total injected was  0.5  ml.  Patient tolerated without complications and given wound care instructions, including not to move product around.  Return in 4 weeks for follow-up and possible additional IL TAC.    3. Seborrheic keratosis, lentigo, angioma, benign nevi, history of BCC  BENIGN LESIONS DISCUSSED WITH PATIENT:  I discussed the specifics of tumor, prognosis, and genetics of benign lesions.  I explained that treatment of these lesions would be purely cosmetic and not medically neccessary.  I discussed with patient different removal options including excision, cautery and /or laser.      Nature and genetics of benign skin lesions dicussed with patient.  Signs and Symptoms of skin cancer discussed with patient.  ABCDEs of melanoma reviewed with patient.  Patient encouraged to perform monthly skin exams.  UV precautions reviewed with patient.  Risks of non-melanoma skin cancer discussed with patient   Return to clinic pending biopsy results.

## 2023-03-30 NOTE — LETTER
3/30/2023         RE: Kalina Schreiber  [address] [address] [facility] [address]        Dear Colleague,    Thank you for referring your patient, Kalina Schreiber, to the [facility]. Please see a copy of my visit note below.    Kalina Schreiber is an extremely pleasant 52year old year old female patient here today for skin check. Scaly area on left cheek, present for months.  No painful or bleeding. She notes FFA, ok will get inflamed occasionally. She uses topical steroids as needed.  Patient has no other skin complaints today.  Remainder of the HPI, Meds, PMH, Allergies, FH, and SH was reviewed in chart.    Pertinent Hx:   History of BCC  Past Medical History:   Diagnosis Date     Allergic rhinitis due to other allergen      Basal cell carcinoma      Solitary cyst of breast      Twin pregnancy, delivered        Past Surgical History:   Procedure Laterality Date     ABDOMEN SURGERY  6/1995 12/2005    C-sections     BREAST SURGERY  10/2002    Augmentation     COLONOSCOPY  02/23/2012    Procedure:COLONOSCOPY; Colonoscopy  ; Surgeon:BOB TY; Location:[facility]     COLONOSCOPY N/A 05/06/2016    Procedure: COLONOSCOPY;  Surgeon: Markus Grossman MD;  Location: [facility]     COSMETIC SURGERY      Breast augmentation     ESOPHAGOSCOPY, GASTROSCOPY, DUODENOSCOPY (EGD), COMBINED N/A 05/06/2016    Procedure: COMBINED ESOPHAGOSCOPY, GASTROSCOPY, DUODENOSCOPY (EGD);  Surgeon: Markus Grossman MD;  Location: [facility]     LASER SURGERY OF EYE  08/21/2008    Both eyes     MAMMOPLASTY AUGMENTATION       SURGICAL HISTORY OF -       T&A     SURGICAL HISTORY OF -       Csection     SURGICAL HISTORY OF -       breast augmentation        Family History   Problem Relation Age of Onset     Alcohol/Drug Mother      Cancer Mother         skin, COD: lung, liver, bone cancer     Melanoma Mother      Diabetes Mother         Post transplant onset     Other Cancer Mother         Lung, liver, bone      Alcohol/Drug Father      Hypertension Father      Cancer Maternal Grandmother         lung/liver     Asthma Maternal Grandmother      Other Cancer Maternal Grandmother         Lung, liver, bone     Cancer Maternal Grandfather         skin     Cancer - colorectal Maternal Grandfather      Melanoma Maternal Grandfather      Colon Cancer Maternal Grandfather      Coronary Artery Disease Maternal Grandfather      Cerebrovascular Disease No family hx of      Breast Cancer No family hx of      Prostate Cancer No family hx of        Social History     Socioeconomic History     Marital status:      Spouse name: Not on file     Number of children: Not on file     Years of education: Not on file     Highest education level: Not on file   Occupational History     Employer: SELF   Tobacco Use     Smoking status: Never     Smokeless tobacco: Never   Vaping Use     Vaping Use: Never used   Substance and Sexual Activity     Alcohol use: Yes     Comment: 0-2 drinks per day     Drug use: No     Sexual activity: Yes     Partners: Male     Birth control/protection: Male Surgical     Comment: vas   Other Topics Concern     Parent/sibling w/ CABG, MI or angioplasty before 65F 55M? 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      Surrogate for twins     Social Determinants of Health     Financial Resource Strain: Not on file   Food Insecurity: Not on file   Transportation Needs: Not on file   Physical Activity: Not on file   Stress: Not on file   Social Connections: Not on file   Intimate Partner Violence: Not on file   Housing Stability: Not on file       Outpatient Encounter Medications as of 3/30/2023   Medication Sig Dispense Refill     ferrous sulfate (IRON) 325 (65 FE) MG tablet Take 1 tablet (325 mg) by mouth 2 times daily 180 tablet 3     fluocinonide (LIDEX) 0.05 % external solution Apply twice daily as needed to frontal scalp. 60 mL 9     fluticasone (FLONASE) 50 MCG/ACT nasal  spray Spray 1 spray into both nostrils daily       metoprolol succinate ER (TOPROL XL) 25 MG 24 hr tablet Take 2 tablets (50 mg) by mouth daily 180 tablet 3     fexofenadine (ALLEGRA) 180 MG tablet Take 1 tablet (180 mg) by mouth daily (Patient not taking: Reported on 3/30/2023) 30 tablet 0     ibuprofen (ADVIL/MOTRIN) 200 MG capsule Take 200 mg by mouth every 4 hours as needed (Patient not taking: Reported on 3/30/2023)       medroxyPROGESTERone (PROVERA) 10 MG tablet Can take up to 2 pills, three times a day for 7 days for heavy bleeding. (Patient not taking: Reported on 2/28/2023) 42 tablet 3     Oxymetazoline HCl (NASAL SPRAY NA) Nasoquort 1 spray in each nostril daily when needed (Patient not taking: Reported on 3/30/2023)       SUMAtriptan (IMITREX) 25 MG tablet Take 1-2 tablets (25-50 mg) by mouth at onset of headache for migraine May repeat dose in 2 hours.  Do not exceed 200 mg in 24 hours (Patient not taking: Reported on 3/30/2023) 18 tablet 11     UNKNOWN TO PATIENT Allergy Eye Drops, 1 drop in each eye daily PRN (Patient not taking: Reported on 3/30/2023)       [DISCONTINUED] fluocinonide (LIDEX) 0.05 % external solution Apply twice daily as needed to frontal scalp. 60 mL 9     Facility-Administered Encounter Medications as of 3/30/2023   Medication Dose Route Frequency Provider Last Rate Last Admin     [COMPLETED] triamcinolone acetonide (KENALOG-10) injection 5 mg  5 mg Intra-Lesional Once Talisha Cleveland PA-C   5 mg at 03/30/23 1149             O:   NAD, WDWN, Alert & Oriented, Mood & Affect wnl, Vitals stable   Here today alone   There were no vitals taken for this visit.   General appearance normal   Vitals stable   Alert, oriented and in no acute distress     Scaly papule on left jaw   Mild erythematous scaly perifollicular papules on frontal scalp   Stuck on papules and brown macules on trunk and ext   Red papules on trunk  Brown papules and macules with regular pigment network and borders  on torso and extremities     The remainder of skin exam is normal      Eyes: Conjunctivae/lids:Normal     ENT: Lips: normal    MSK:Normal    Cardiovascular: peripheral edema none    Pulm: Breathing Normal    Neuro/Psych: Orientation:Alert and Orientedx3 ; Mood/Affect:normal   A/P:  1. Actinic keratosis on left jaw x 1  LN2:  Treated with LN2 for 5s for 1-2 cycles. Warned risks of blistering, pain, pigment change, scarring, and incomplete resolution.  Advised patient to return if lesions do not completely resolve.  Wound care sheet given.  2. Frontal fibrosing alopecia   IL TAC: PGACAC discussed.  Risks including but not limited to injection site reaction, bruising, no resolution.  All questions answered and entertained to patient s satisfaction.  Informed consent obtained.  IL TAC in concentration of 5 mg/ml was injected ID to frontal fibrosing alopecia.  Total injected was  0.5  ml.  Patient tolerated without complications and given wound care instructions, including not to move product around.  Return in 4 weeks for follow-up and possible additional IL TAC.    3. Seborrheic keratosis, lentigo, angioma, benign nevi, history of BCC  BENIGN LESIONS DISCUSSED WITH PATIENT:  I discussed the specifics of tumor, prognosis, and genetics of benign lesions.  I explained that treatment of these lesions would be purely cosmetic and not medically neccessary.  I discussed with patient different removal options including excision, cautery and /or laser.      Nature and genetics of benign skin lesions dicussed with patient.  Signs and Symptoms of skin cancer discussed with patient.  ABCDEs of melanoma reviewed with patient.  Patient encouraged to perform monthly skin exams.  UV precautions reviewed with patient.  Risks of non-melanoma skin cancer discussed with patient   Return to clinic pending biopsy results.         Again, thank you for allowing me to participate in the care of your patient.        Sincerely,        Talisha  Brina Aguilar PA-C

## 2023-03-30 NOTE — PROGRESS NOTES
Prior to immunization administration, verified patients identity using patient s name and date of birth. Please see Immunization Activity for additional information.     Screening Questionnaire for Adult Immunization    Are you sick today?   No   Do you have allergies to medications, food, a vaccine component or latex?   No   Have you ever had a serious reaction after receiving a vaccination?   No   Do you have a long-term health problem with heart, lung, kidney, or metabolic disease (e.g., diabetes), asthma, a blood disorder, no spleen, complement component deficiency, a cochlear implant, or a spinal fluid leak?  Are you on long-term aspirin therapy?   No   Do you have cancer, leukemia, HIV/AIDS, or any other immune system problem?   No   Do you have a parent, brother, or sister with an immune system problem?   No   In the past 3 months, have you taken medications that affect  your immune system, such as prednisone, other steroids, or anticancer drugs; drugs for the treatment of rheumatoid arthritis, Crohn s disease, or psoriasis; or have you had radiation treatments?   No   Have you had a seizure, or a brain or other nervous system problem?   No   During the past year, have you received a transfusion of blood or blood    products, or been given immune (gamma) globulin or antiviral drug?   No   For women: Are you pregnant or is there a chance you could become       pregnant during the next month?   No   Have you received any vaccinations in the past 4 weeks?   No     Immunization questionnaire answers were all negative.    I have reviewed the following standing orders:   This patient is due and qualifies for the Zoster vaccine.    Click here for Zoster Standing Order    I have reviewed the vaccines inclusion and exclusion criteria; No concerns regarding eligibility.         Injection of Shingrix  given by Segundo Manrique CMA. Patient instructed to remain in clinic for 15 minutes afterwards, and to report any  adverse reactions.     Screening performed by Segundo Manrique CMA on 3/30/2023 at 10:47 AM.

## 2023-09-14 ENCOUNTER — OFFICE VISIT (OUTPATIENT)
Dept: CARDIOLOGY | Facility: CLINIC | Age: 53
End: 2023-09-14
Attending: INTERNAL MEDICINE
Payer: COMMERCIAL

## 2023-09-14 ENCOUNTER — PATIENT OUTREACH (OUTPATIENT)
Dept: CARE COORDINATION | Facility: CLINIC | Age: 53
End: 2023-09-14

## 2023-09-14 ENCOUNTER — HOSPITAL ENCOUNTER (OUTPATIENT)
Dept: CARDIOLOGY | Facility: CLINIC | Age: 53
Discharge: HOME | End: 2023-09-14
Attending: NURSE PRACTITIONER | Admitting: NURSE PRACTITIONER
Payer: COMMERCIAL

## 2023-09-14 VITALS
HEIGHT: 67 IN | WEIGHT: 158.8 LBS | SYSTOLIC BLOOD PRESSURE: 118 MMHG | DIASTOLIC BLOOD PRESSURE: 73 MMHG | HEART RATE: 60 BPM | BODY MASS INDEX: 24.92 KG/M2 | OXYGEN SATURATION: 99 %

## 2023-09-14 DIAGNOSIS — I49.3 PVC (PREMATURE VENTRICULAR CONTRACTION): Primary | ICD-10-CM

## 2023-09-14 DIAGNOSIS — I49.3 PVC (PREMATURE VENTRICULAR CONTRACTION): ICD-10-CM

## 2023-09-14 PROCEDURE — 99214 OFFICE O/P EST MOD 30 MIN: CPT | Mod: 25 | Performed by: NURSE PRACTITIONER

## 2023-09-14 PROCEDURE — 93226 XTRNL ECG REC<48 HR SCAN A/R: CPT

## 2023-09-14 PROCEDURE — 93227 XTRNL ECG REC<48 HR R&I: CPT | Performed by: INTERNAL MEDICINE

## 2023-09-14 NOTE — PROGRESS NOTES
Cardiology Clinic Progress Note  Kalina Schreiber MRN# [id number]   YOB: 1970 Age: 52 year old      Primary Cardiologist:   Dr. Avery          History of Presenting Illness:      Kalina Schreiber is a pleasant 52 year old patient with a past cardiac history significant for   PVC (premature ventricular contraction)  Incidentally found to have bradycardia and heart monitor confirmed frequent PVCs  Holter 2010 10% PVCs  Zio patch 2023 31% burden- started beta-blocker  Follow-up Holter after starting beta-blocker down to 23%  Past medical history significant for migraines.    Patient was seen by Dr. Avery in consultation in February 2023 for frequent PVCs.  She was asymptomatic and PVC burden improved to 23% after starting beta-blocker.  Cardiac stress MRI was negative for ischemia with normal EF and no scar or infiltrative process.  He noted the PVC morphology was suggestive of outflow tract origin.  He recommended continuing beta-blocker and reevaluate PVCs in 6 months.    Patient presents today for 6-month follow-up.  Most recent lipid profile, BMP, ALT reviewed today.  She only rarely notices palpitations and states that she was previously under a lot more stress/anxiety which has improved.  We discussed lifestyle modification decreasing alcohol, stress, caffeine.  She denies any problems with metoprolol.  Blood pressure today is 118/73.  Patient reports no chest pain, shortness of breath, PND, orthopnea, presyncope, syncope, edema, heart racing.                       Assessment and Plan:       Plan  Patient Instructions   Medication Changes:  None     Recommendations:  Call with questions    Follow-up:  Cardiology follow up at [facility]: Establish with new electrical cardiologist in 6 months  24-hour Holter monitor to reassess PVC burden- we will notify you of results   Call 6 months prior, to schedule.     Cardiology Scheduling~[phone]  Cardiology Clinic RN~[phone] Elvis RYAN, Nena  RN)            PVC (premature ventricular contraction)  Asymptomatic  Continue beta-blocker  Reassess PVC burden              Respiratory:  clear to auscultation; normal symmetry        Cardiac: regular rate and rhythm     GI:  abdomen nondistended     Extremities and Muscular Skeletal:  no edema          Thank you for allowing me to participate in this delightful patient's care.      This note was completed in part using Dragon voice recognition software. Although reviewed after completion, some word and grammatical errors may occur.    Nat Higgins, APRN CNP

## 2023-09-14 NOTE — LETTER
9/14/2023    Magy Franco, APRN CNP  [address] [facility] [address]    RE: Kalina Schreiber       Dear Colleague,     I had the pleasure of seeing Kalina Schreiber in the [facility].  Cardiology Clinic Progress Note  Kalina Schreiber MRN# [id number]   YOB: 1970 Age: 52 year old      Primary Cardiologist:   Dr. Avery          History of Presenting Illness:      Kalina Schreiber is a pleasant 52 year old patient with a past cardiac history significant for   PVC (premature ventricular contraction)  Incidentally found to have bradycardia and heart monitor confirmed frequent PVCs  Holter 2010 10% PVCs  Zio patch 2023 31% burden- started beta-blocker  Follow-up Holter after starting beta-blocker down to 23%  Past medical history significant for migraines.    Patient was seen by Dr. Avery in consultation in February 2023 for frequent PVCs.  She was asymptomatic and PVC burden improved to 23% after starting beta-blocker.  Cardiac stress MRI was negative for ischemia with normal EF and no scar or infiltrative process.  He noted the PVC morphology was suggestive of outflow tract origin.  He recommended continuing beta-blocker and reevaluate PVCs in 6 months.    Patient presents today for 6-month follow-up.  Most recent lipid profile, BMP, ALT reviewed today.  She only rarely notices palpitations and states that she was previously under a lot more stress/anxiety which has improved.  We discussed lifestyle modification decreasing alcohol, stress, caffeine.  She denies any problems with metoprolol.  Blood pressure today is 118/73.  Patient reports no chest pain, shortness of breath, PND, orthopnea, presyncope, syncope, edema, heart racing.                       Assessment and Plan:       Plan  Patient Instructions   Medication Changes:  None     Recommendations:  Call with questions    Follow-up:  Cardiology follow up at [facility]: Establish with new electrical cardiologist in 6  months  24-hour Holter monitor to reassess PVC burden- we will notify you of results   Call 6 months prior, to schedule.     Cardiology Scheduling~[phone]  Cardiology Clinic RN~[phone] (Robyn RN, Nena RN)            PVC (premature ventricular contraction)  Asymptomatic  Continue beta-blocker  Reassess PVC burden              Respiratory:  clear to auscultation; normal symmetry        Cardiac: regular rate and rhythm     GI:  abdomen nondistended     Extremities and Muscular Skeletal:  no edema          Thank you for allowing me to participate in this delightful patient's care.      This note was completed in part using Dragon voice recognition software. Although reviewed after completion, some word and grammatical errors may occur.    PENNY Bryan CNP                  Thank you for allowing me to participate in the care of your patient.      Sincerely,     PENNY Bryan CNP     [facility]  cc:   Joshua Avery MD  [address]  [place] [address]

## 2023-09-14 NOTE — PATIENT INSTRUCTIONS
Medication Changes:  None     Recommendations:  Call with questions    Follow-up:  Cardiology follow up at [facility]: Establish with new electrical cardiologist in 6 months  24-hour Holter monitor to reassess PVC burden- we will notify you of results   Call 6 months prior, to schedule.     Cardiology Scheduling~[phone]  Cardiology Clinic RN~[phone] (Robyn RN, Nena RN)

## 2023-09-28 ENCOUNTER — PATIENT OUTREACH (OUTPATIENT)
Dept: CARE COORDINATION | Facility: CLINIC | Age: 53
End: 2023-09-28
Payer: COMMERCIAL

## 2023-10-05 ENCOUNTER — OFFICE VISIT (OUTPATIENT)
Dept: DERMATOLOGY | Facility: CLINIC | Age: 53
End: 2023-10-05
Payer: COMMERCIAL

## 2023-10-05 DIAGNOSIS — L81.4 LENTIGO: ICD-10-CM

## 2023-10-05 DIAGNOSIS — D22.9 MULTIPLE BENIGN NEVI: ICD-10-CM

## 2023-10-05 DIAGNOSIS — Z85.828 HISTORY OF BASAL CELL CARCINOMA: ICD-10-CM

## 2023-10-05 DIAGNOSIS — L82.0 INFLAMED SEBORRHEIC KERATOSIS: ICD-10-CM

## 2023-10-05 DIAGNOSIS — L57.0 ACTINIC KERATOSIS: ICD-10-CM

## 2023-10-05 DIAGNOSIS — L82.1 SEBORRHEIC KERATOSIS: ICD-10-CM

## 2023-10-05 DIAGNOSIS — D18.01 CHERRY ANGIOMA: ICD-10-CM

## 2023-10-05 DIAGNOSIS — L66.12 FRONTAL FIBROSING ALOPECIA: Primary | ICD-10-CM

## 2023-10-05 PROCEDURE — 17110 DESTRUCTION B9 LES UP TO 14: CPT | Performed by: PHYSICIAN ASSISTANT

## 2023-10-05 PROCEDURE — 17003 DESTRUCT PREMALG LES 2-14: CPT | Mod: 59 | Performed by: PHYSICIAN ASSISTANT

## 2023-10-05 PROCEDURE — 17000 DESTRUCT PREMALG LESION: CPT | Mod: 59 | Performed by: PHYSICIAN ASSISTANT

## 2023-10-05 PROCEDURE — 99213 OFFICE O/P EST LOW 20 MIN: CPT | Mod: 25 | Performed by: PHYSICIAN ASSISTANT

## 2023-10-05 NOTE — PROGRESS NOTES
Kalina Schreiber is an extremely pleasant 52year old year old female patient here today for skin check. Scaly area on left cheek, present for months.  No painful or bleeding. She notes FFA, ok will get inflamed occasionally. She uses topical steroids as needed.  Patient has no other skin complaints today.  Remainder of the HPI, Meds, PMH, Allergies, FH, and SH was reviewed in chart.    Pertinent Hx:   History of BCC  Past Medical History:   Diagnosis Date    Allergic rhinitis due to other allergen     Basal cell carcinoma     Solitary cyst of breast     Twin pregnancy, delivered        Past Surgical History:   Procedure Laterality Date    ABDOMEN SURGERY  6/1995 12/2005    C-sections    BREAST SURGERY  10/2002    Augmentation    COLONOSCOPY  02/23/2012    Procedure:COLONOSCOPY; Colonoscopy  ; Surgeon:BOB TY; Location:[facility]    COLONOSCOPY N/A 05/06/2016    Procedure: COLONOSCOPY;  Surgeon: Markus Grossman MD;  Location: [facility]    COSMETIC SURGERY      Breast augmentation    ESOPHAGOSCOPY, GASTROSCOPY, DUODENOSCOPY (EGD), COMBINED N/A 05/06/2016    Procedure: COMBINED ESOPHAGOSCOPY, GASTROSCOPY, DUODENOSCOPY (EGD);  Surgeon: Markus Grossman MD;  Location: [facility]    LASER SURGERY OF EYE  08/21/2008    Both eyes    MAMMOPLASTY AUGMENTATION      SURGICAL HISTORY OF -       T&A    SURGICAL HISTORY OF -       Csection    SURGICAL HISTORY OF -       breast augmentation        Family History   Problem Relation Age of Onset    Alcohol/Drug Mother     Cancer Mother         skin, COD: lung, liver, bone cancer    Melanoma Mother     Diabetes Mother         Post transplant onset    Other Cancer Mother         Lung, liver, bone    Alcohol/Drug Father     Hypertension Father     Cancer Maternal Grandmother         lung/liver    Asthma Maternal Grandmother     Other Cancer Maternal Grandmother         Lung, liver, bone    Cancer Maternal Grandfather         skin    Cancer - colorectal Maternal Grandfather      Melanoma Maternal Grandfather     Colon Cancer Maternal Grandfather     Coronary Artery Disease Maternal Grandfather     Cerebrovascular Disease No family hx of     Breast Cancer No family hx of     Prostate Cancer No family hx of        Social History     Socioeconomic History    Marital status:      Spouse name: Not on file    Number of children: Not on file    Years of education: Not on file    Highest education level: Not on file   Occupational History     Employer: SELF   Tobacco Use    Smoking status: Never    Smokeless tobacco: Never   Vaping Use    Vaping Use: Never used   Substance and Sexual Activity    Alcohol use: Yes     Comment: 0-2 drinks per day    Drug use: No    Sexual activity: Yes     Partners: Male     Birth control/protection: Male Surgical     Comment: vas   Other Topics Concern    Parent/sibling w/ CABG, MI or angioplasty before 65F 55M? 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      Surrogate for twins     Social Determinants of Health     Financial Resource Strain: Not on file   Food Insecurity: Not on file   Transportation Needs: Not on file   Physical Activity: Not on file   Stress: Not on file   Social Connections: Not on file   Interpersonal Safety: Not on file   Housing Stability: Not on file       Outpatient Encounter Medications as of 10/5/2023   Medication Sig Dispense Refill    ferrous sulfate (IRON) 325 (65 FE) MG tablet Take 1 tablet (325 mg) by mouth 2 times daily 180 tablet 3    fexofenadine (ALLEGRA) 180 MG tablet Take 1 tablet (180 mg) by mouth daily 30 tablet 0    fluocinonide (LIDEX) 0.05 % external solution Apply twice daily as needed to frontal scalp. (Patient not taking: Reported on 10/5/2023) 60 mL 9    fluticasone (FLONASE) 50 MCG/ACT nasal spray Spray 1 spray into both nostrils daily      ibuprofen (ADVIL/MOTRIN) 200 MG capsule Take 200 mg by mouth every 4 hours as needed      metoprolol succinate ER (TOPROL XL) 25 MG  24 hr tablet Take 2 tablets (50 mg) by mouth daily 180 tablet 3    Oxymetazoline HCl (NASAL SPRAY NA) Nasoquort 1 spray in each nostril daily when needed      SUMAtriptan (IMITREX) 25 MG tablet Take 1-2 tablets (25-50 mg) by mouth at onset of headache for migraine May repeat dose in 2 hours.  Do not exceed 200 mg in 24 hours 18 tablet 11    UNKNOWN TO PATIENT Allergy Eye Drops, 1 drop in each eye daily PRN       No facility-administered encounter medications on file as of 10/5/2023.             O:   NAD, WDWN, Alert & Oriented, Mood & Affect wnl, Vitals stable   Here today alone   There were no vitals taken for this visit.   General appearance normal   Vitals stable   Alert, oriented and in no acute distress        Scarring hair loss on frontal scalp, stable   Stuck on papules and brown macules on trunk and ext   Red papules on trunk  Brown papules and macules with regular pigment network and borders on torso and extremities   Gritty papules on chest x 4   The remainder of skin exam is normal      Eyes: Conjunctivae/lids:Normal     ENT: Lips: normal    MSK:Normal    Cardiovascular: peripheral edema none    Pulm: Breathing Normal    Neuro/Psych: Orientation:Alert and Orientedx3 ; Mood/Affect:normal   A/P:  1. Frontal fibrosing alopecia   Doing well, continue lidex solution three times weekly.  2. Inflamed seborrheic keratosis on left posterior shoulder, right earlobe, right achilles  LN2:  Treated with LN2 for 5s for 1-2 cycles. Warned risks of blistering, pain, pigment change, scarring, and incomplete resolution.  Advised patient to return if lesions do not completely resolve.  Wound care sheet given.  3. Actinic keratoses on chest x 4  LN2:  Treated with LN2 for 5s for 1-2 cycles. Warned risks of blistering, pain, pigment change, scarring, and incomplete resolution.  Advised patient to return if lesions do not completely resolve.  Wound care sheet given.  4. Seborrheic keratosis, lentigo, angioma, benign nevi,  history of BCC  BENIGN LESIONS DISCUSSED WITH PATIENT:  I discussed the specifics of tumor, prognosis, and genetics of benign lesions.  I explained that treatment of these lesions would be purely cosmetic and not medically neccessary.  I discussed with patient different removal options including excision, cautery and /or laser.      Nature and genetics of benign skin lesions dicussed with patient.  Signs and Symptoms of skin cancer discussed with patient.  ABCDEs of melanoma reviewed with patient.  Patient encouraged to perform monthly skin exams.  UV precautions reviewed with patient.  Risks of non-melanoma skin cancer discussed with patient   Return to clinic in 6 months

## 2023-10-05 NOTE — PATIENT INSTRUCTIONS
WOUND CARE INSTRUCTIONS   FOR CRYOSURGERY   This area treated with liquid nitrogen should form a blister (areas treated may or may not blister-skin may just turn dark and slough off). You do not need to bandage the area unless a blister forms and breaks (which may be a few days). When the blister breaks, begin daily dressing changes as follows:   1) Clean and dry the area with tap water using clean Q-tip or sterile gauze pad.   2) Apply Polysporin ointment or Bacitracin ointment over entire wound. Do NOT use Neosporin ointment.   3) Cover the wound with a band-aid or sterile non-stick gauze pad and micropore paper tape.   REPEAT THESE INSTRUCTIONS AT LEAST ONCE A DAY UNTIL THE WOUND HAS COMPLETELY HEALED.   It is an old wives tale that a wound heals better when it is exposed to air and allowed to dry out. The wound will heal faster with a better cosmetic result if it is kept moist with ointment and covered with a bandage.   *Do not let the wound dry out.   Supplies Needed:   *Cotton tipped applicators (Q-tips)   *Polysporin ointment or Bacitracin ointment (NOT NEOSPORIN)   *Band-aids, or non stick gauze pads and micropore paper tape   PATIENT INFORMATION   During the healing process you will notice a number of changes. All wounds develop a small halo of redness surrounding the wound. This means healing is occurring. Severe itching with extensive redness usually indicates sensitivity to the ointment or bandage tape used to dress the wound. You should call our office if this develops.   Swelling and/or discoloration around your surgical site is common, particularly when performed around the eye.   All wounds normally drain. The larger the wound the more drainage there will be. After 7-10 days, you will notice the wound beginning to shrink and new skin will begin to grow. The wound is healed when you can see skin has formed over the entire area. A healed wound has a healthy, shiny look to the surface and is red to  dark pink in color to normalize. Wounds may take approximately 4-6 weeks to heal. Larger wounds may take 6-8 weeks. After the wound is healed you may discontinue dressing changes.   You may experience a sensation of tightness as your wound heals. This is normal and will gradually subside.   Your healed wound may be sensitive to temperature changes. This sensitivity improves with time, but if you re having a lot of discomfort, try to avoid temperature extremes.   Patients frequently experience itching after their wound appears to have healed because of the continue healing under the skin. Plain Vaseline will help relieve the itching.      Proper skin care from [facility]:    -Eliminate harsh soaps as they strip the natural oils from the skin, often resulting in dry itchy skin ( i.e. Dial, Zest, Angely Spring)  -Use mild soaps such as Cetaphil or Dove Sensitive Skin in the shower. You do not need to use soap on arms, legs, and trunk every time you shower unless visibly soiled.   -Avoid hot or cold showers.  -After showering, lightly dry off and apply moisturizer within 2-3 minutes. This will help trap moisture in the skin.   -Aggressive use of a moisturizer at least 1-2 times a day to the entire body (including -Vanicream, Cetaphil, Aquaphor or Cerave) and moisturize hands after every washing.  -We recommend using moisturizers that come in a tub that needs to be scooped out, not a pump. This has more of an oil base. It will hold moisture in your skin much better than a water base moisturizer. The above recommended are non-pore clogging.      Wear a sunscreen with at least SPF 30 on your face, ears, neck and V of the chest daily. Wear sunscreen on other areas of the body if those areas are exposed to the sun throughout the day. Sunscreens can contain physical and/or chemical blockers. Physical blockers are less likely to clog pores, these include zinc oxide and titanium dioxide. Reapply every two hour and  after swimming.     Sunscreen examples: https://www.ewg.org/sunscreen/    UV radiation  UVA radiation remains constant throughout the day and throughout the year. It is a longer wavelength than UVB and therefore penetrates deeper into the skin leading to immediate and delayed tanning, photoaging, and skin cancer. 70-80% of UVA and UVB radiation occurs between the hours of 10am-2pm.  UVB radiation  UVB radiation causes the most harmful effects and is more significant during the summer months. However, snow and ice can reflect UVB radiation leading to skin damage during the winter months as well. UVB radiation is responsible for tanning, burning, inflammation, delayed erythema (pinkness), pigmentation (brown spots), and skin cancer.     I recommend self monthly full body exams and yearly full body exams with a dermatology provider. If you develop a new or changing lesion please follow up for examination. Most skin cancers are pink and scaly or pink and pearly. However, we do see blue/brown/black skin cancers.  Consider the ABCDEs of melanoma when giving yourself your monthly full body exam ( don't forget the groin, buttocks, feet, toes, etc). A-asymmetry, B-borders, C-color, D-diameter, E-elevation or evolving. If you see any of these changes please follow up in clinic. If you cannot see your back I recommend purchasing a hand held mirror to use with a larger wall mirror.

## 2023-10-05 NOTE — LETTER
10/5/2023         RE: Kalina Schreiber  [id number] [facility] [address]        Dear Colleague,    Thank you for referring your patient, Kalina Schreiber, to the [facility]. Please see a copy of my visit note below.    Kalina Schreiber is an extremely pleasant 52year old year old female patient here today for skin check. Scaly area on left cheek, present for months.  No painful or bleeding. She notes FFA, ok will get inflamed occasionally. She uses topical steroids as needed.  Patient has no other skin complaints today.  Remainder of the HPI, Meds, PMH, Allergies, FH, and SH was reviewed in chart.    Pertinent Hx:   History of BCC  Past Medical History:   Diagnosis Date     Allergic rhinitis due to other allergen      Basal cell carcinoma      Solitary cyst of breast      Twin pregnancy, delivered        Past Surgical History:   Procedure Laterality Date     ABDOMEN SURGERY  6/1995 12/2005    C-sections     BREAST SURGERY  10/2002    Augmentation     COLONOSCOPY  02/23/2012    Procedure:COLONOSCOPY; Colonoscopy  ; Surgeon:BOB TY; Location:[facility]     COLONOSCOPY N/A 05/06/2016    Procedure: COLONOSCOPY;  Surgeon: Markus Grossman MD;  Location: [facility]     COSMETIC SURGERY      Breast augmentation     ESOPHAGOSCOPY, GASTROSCOPY, DUODENOSCOPY (EGD), COMBINED N/A 05/06/2016    Procedure: COMBINED ESOPHAGOSCOPY, GASTROSCOPY, DUODENOSCOPY (EGD);  Surgeon: Markus Grossman MD;  Location: [facility]     LASER SURGERY OF EYE  08/21/2008    Both eyes     MAMMOPLASTY AUGMENTATION       SURGICAL HISTORY OF -       T&A     SURGICAL HISTORY OF -       Csection     SURGICAL HISTORY OF -       breast augmentation        Family History   Problem Relation Age of Onset     Alcohol/Drug Mother      Cancer Mother         skin, COD: lung, liver, bone cancer     Melanoma Mother      Diabetes Mother         Post transplant onset     Other Cancer Mother         Lung, liver, bone      Alcohol/Drug Father      Hypertension Father      Cancer Maternal Grandmother         lung/liver     Asthma Maternal Grandmother      Other Cancer Maternal Grandmother         Lung, liver, bone     Cancer Maternal Grandfather         skin     Cancer - colorectal Maternal Grandfather      Melanoma Maternal Grandfather      Colon Cancer Maternal Grandfather      Coronary Artery Disease Maternal Grandfather      Cerebrovascular Disease No family hx of      Breast Cancer No family hx of      Prostate Cancer No family hx of        Social History     Socioeconomic History     Marital status:      Spouse name: Not on file     Number of children: Not on file     Years of education: Not on file     Highest education level: Not on file   Occupational History     Employer: SELF   Tobacco Use     Smoking status: Never     Smokeless tobacco: Never   Vaping Use     Vaping Use: Never used   Substance and Sexual Activity     Alcohol use: Yes     Comment: 0-2 drinks per day     Drug use: No     Sexual activity: Yes     Partners: Male     Birth control/protection: Male Surgical     Comment: vas   Other Topics Concern     Parent/sibling w/ CABG, MI or angioplasty before 65F 55M? 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      Surrogate for twins     Social Determinants of Health     Financial Resource Strain: Not on file   Food Insecurity: Not on file   Transportation Needs: Not on file   Physical Activity: Not on file   Stress: Not on file   Social Connections: Not on file   Interpersonal Safety: Not on file   Housing Stability: Not on file       Outpatient Encounter Medications as of 10/5/2023   Medication Sig Dispense Refill     ferrous sulfate (IRON) 325 (65 FE) MG tablet Take 1 tablet (325 mg) by mouth 2 times daily 180 tablet 3     fexofenadine (ALLEGRA) 180 MG tablet Take 1 tablet (180 mg) by mouth daily 30 tablet 0     fluocinonide (LIDEX) 0.05 % external solution Apply twice  daily as needed to frontal scalp. (Patient not taking: Reported on 10/5/2023) 60 mL 9     fluticasone (FLONASE) 50 MCG/ACT nasal spray Spray 1 spray into both nostrils daily       ibuprofen (ADVIL/MOTRIN) 200 MG capsule Take 200 mg by mouth every 4 hours as needed       metoprolol succinate ER (TOPROL XL) 25 MG 24 hr tablet Take 2 tablets (50 mg) by mouth daily 180 tablet 3     Oxymetazoline HCl (NASAL SPRAY NA) Nasoquort 1 spray in each nostril daily when needed       SUMAtriptan (IMITREX) 25 MG tablet Take 1-2 tablets (25-50 mg) by mouth at onset of headache for migraine May repeat dose in 2 hours.  Do not exceed 200 mg in 24 hours 18 tablet 11     UNKNOWN TO PATIENT Allergy Eye Drops, 1 drop in each eye daily PRN       No facility-administered encounter medications on file as of 10/5/2023.             O:   NAD, WDWN, Alert & Oriented, Mood & Affect wnl, Vitals stable   Here today alone   There were no vitals taken for this visit.   General appearance normal   Vitals stable   Alert, oriented and in no acute distress        Scarring hair loss on frontal scalp, stable   Stuck on papules and brown macules on trunk and ext   Red papules on trunk  Brown papules and macules with regular pigment network and borders on torso and extremities   Gritty papules on chest x 4   The remainder of skin exam is normal      Eyes: Conjunctivae/lids:Normal     ENT: Lips: normal    MSK:Normal    Cardiovascular: peripheral edema none    Pulm: Breathing Normal    Neuro/Psych: Orientation:Alert and Orientedx3 ; Mood/Affect:normal   A/P:  1. Frontal fibrosing alopecia   Doing well, continue lidex solution three times weekly.  2. Inflamed seborrheic keratosis on left posterior shoulder, right earlobe, right achilles  LN2:  Treated with LN2 for 5s for 1-2 cycles. Warned risks of blistering, pain, pigment change, scarring, and incomplete resolution.  Advised patient to return if lesions do not completely resolve.  Wound care sheet given.  3.  Actinic keratoses on chest x 4  LN2:  Treated with LN2 for 5s for 1-2 cycles. Warned risks of blistering, pain, pigment change, scarring, and incomplete resolution.  Advised patient to return if lesions do not completely resolve.  Wound care sheet given.  4. Seborrheic keratosis, lentigo, angioma, benign nevi, history of BCC  BENIGN LESIONS DISCUSSED WITH PATIENT:  I discussed the specifics of tumor, prognosis, and genetics of benign lesions.  I explained that treatment of these lesions would be purely cosmetic and not medically neccessary.  I discussed with patient different removal options including excision, cautery and /or laser.      Nature and genetics of benign skin lesions dicussed with patient.  Signs and Symptoms of skin cancer discussed with patient.  ABCDEs of melanoma reviewed with patient.  Patient encouraged to perform monthly skin exams.  UV precautions reviewed with patient.  Risks of non-melanoma skin cancer discussed with patient   Return to clinic in 6 months       Again, thank you for allowing me to participate in the care of your patient.        Sincerely,        Talisha Aguilar PA-C

## 2023-11-08 ASSESSMENT — ENCOUNTER SYMPTOMS
FREQUENCY: 0
ABDOMINAL PAIN: 0
JOINT SWELLING: 0
COUGH: 0
CHILLS: 0
FEVER: 0
DIARRHEA: 0
PALPITATIONS: 0
HEADACHES: 0
DIZZINESS: 0
HEMATOCHEZIA: 0
BREAST MASS: 0
HEARTBURN: 0
NERVOUS/ANXIOUS: 0
HEMATURIA: 0
MYALGIAS: 0
PARESTHESIAS: 0
WEAKNESS: 0
EYE PAIN: 0
DYSURIA: 0
SORE THROAT: 0
SHORTNESS OF BREATH: 0
CONSTIPATION: 0
NAUSEA: 0
ARTHRALGIAS: 0

## 2023-11-09 ENCOUNTER — OFFICE VISIT (OUTPATIENT)
Dept: FAMILY MEDICINE | Facility: CLINIC | Age: 53
End: 2023-11-09
Payer: COMMERCIAL

## 2023-11-09 VITALS
HEIGHT: 68 IN | DIASTOLIC BLOOD PRESSURE: 80 MMHG | WEIGHT: 158 LBS | OXYGEN SATURATION: 100 % | SYSTOLIC BLOOD PRESSURE: 120 MMHG | BODY MASS INDEX: 23.95 KG/M2 | TEMPERATURE: 98 F | RESPIRATION RATE: 12 BRPM | HEART RATE: 65 BPM

## 2023-11-09 DIAGNOSIS — G43.109 MIGRAINE WITH AURA AND WITHOUT STATUS MIGRAINOSUS, NOT INTRACTABLE: ICD-10-CM

## 2023-11-09 DIAGNOSIS — Z00.00 ROUTINE GENERAL MEDICAL EXAMINATION AT A HEALTH CARE FACILITY: Primary | ICD-10-CM

## 2023-11-09 DIAGNOSIS — N92.0 MENORRHAGIA WITH REGULAR CYCLE: ICD-10-CM

## 2023-11-09 PROCEDURE — 99396 PREV VISIT EST AGE 40-64: CPT | Performed by: NURSE PRACTITIONER

## 2023-11-09 PROCEDURE — 99213 OFFICE O/P EST LOW 20 MIN: CPT | Mod: 25 | Performed by: NURSE PRACTITIONER

## 2023-11-09 RX ORDER — SUMATRIPTAN 25 MG/1
25-50 TABLET, FILM COATED ORAL
Qty: 18 TABLET | Refills: 11 | Status: SHIPPED | OUTPATIENT
Start: 2023-11-09

## 2023-11-09 ASSESSMENT — ENCOUNTER SYMPTOMS
COUGH: 0
MYALGIAS: 0
JOINT SWELLING: 0
SHORTNESS OF BREATH: 0
HEADACHES: 0
HEARTBURN: 0
NAUSEA: 0
DIZZINESS: 0
BREAST MASS: 0
ABDOMINAL PAIN: 0
PALPITATIONS: 0
SORE THROAT: 0
HEMATURIA: 0
PARESTHESIAS: 0
EYE PAIN: 0
FEVER: 0
FREQUENCY: 0
DIARRHEA: 0
DYSURIA: 0
WEAKNESS: 0
HEMATOCHEZIA: 0
ARTHRALGIAS: 0
CONSTIPATION: 0
CHILLS: 0
NERVOUS/ANXIOUS: 0

## 2023-11-09 ASSESSMENT — PAIN SCALES - GENERAL: PAINLEVEL: NO PAIN (0)

## 2023-11-09 NOTE — PROGRESS NOTES
SUBJECTIVE:   CC: Kalina is an 52 year old who presents for preventive health visit.       11/9/2023    12:40 PM   Additional Questions   Roomed by Donna RAMEY   Accompanied by self       Healthy Habits:     Getting at least 3 servings of Calcium per day:  NO    Bi-annual eye exam:  Yes    Dental care twice a year:  Yes    Sleep apnea or symptoms of sleep apnea:  None    Diet:  Regular (no restrictions)    Frequency of exercise:  None    Taking medications regularly:  Yes    Medication side effects:  None    Additional concerns today:  Yes        PROBLEMS TO ADD ON...  Migraine   Since your last clinic visit, how have your headaches changed?  Improved  How often are you getting headaches or migraines? Very rarely - hasn't had a migraine in a few years.  Headaches one every few months   Are you able to do normal daily activities when you have a migraine? Yes  Are you taking rescue/relief medications? (Select all that apply) sumatriptan (Imitrex)  How helpful is your rescue/relief medication?  I get total relief  Are you taking any medications to prevent migraines? (Select all that apply)  No  In the past 4 weeks, how often have you gone to urgent care or the emergency room because of your headaches?  0      Continues to have heavy periods:  Having regular monthly periods.  Heavy the first 2 to 3 days.  Bleeding through tampons.  Has been this way for the last 18 years.  Last pelvic ultrasound was in 2018.  She had an OB/GYN consult 1 year ago.  She has had no symptoms of menopause thus far; denies hot flashes, night sweats, mood swings or vaginal symptoms.  She is unsure what age her mother went through menopause      Social History     Tobacco Use    Smoking status: Never    Smokeless tobacco: Never   Substance Use Topics    Alcohol use: Yes     Comment: 0-2 drinks per day             11/8/2023     3:23 PM   Alcohol Use   Prescreen: >3 drinks/day or >7 drinks/week? No     Reviewed orders with patient.  Reviewed  health maintenance and updated orders accordingly - Yes      Breast Cancer Screening:    FHS-7:       11/30/2021     1:14 PM 10/13/2022     5:04 PM 11/8/2023     3:25 PM   Breast CA Risk Assessment (FHS-7)   Did any of your first-degree relatives have breast or ovarian cancer? No No No   Did any of your relatives have bilateral breast cancer? No No No   Did any man in your family have breast cancer? No No No   Did any woman in your family have breast and ovarian cancer? No No No   Did any woman in your family have breast cancer before age 50 y? No No No   Do you have 2 or more relatives with breast and/or ovarian cancer? No No No   Do you have 2 or more relatives with breast and/or bowel cancer? Yes Yes No         Pertinent mammograms are reviewed under the imaging tab.    History of abnormal Pap smear: NO - age 30-65 PAP every 5 years with negative HPV co-testing recommended      Latest Ref Rng & Units 10/14/2022     2:06 PM 8/28/2017     1:04 PM 8/6/2014    12:00 AM   PAP / HPV   PAP  Negative for Intraepithelial Lesion or Malignancy (NILM)      PAP (Historical)   NIL  NIL    HPV 16 DNA Negative Negative  Negative     HPV 18 DNA Negative Negative  Negative     Other HR HPV Negative Negative  Negative       Reviewed and updated as needed this visit by clinical staff   Tobacco  Allergies  Meds              Reviewed and updated as needed this visit by Provider                     Review of Systems   Constitutional:  Negative for chills and fever.   HENT:  Positive for ear pain. Negative for congestion, hearing loss and sore throat.    Eyes:  Negative for pain and visual disturbance.   Respiratory:  Negative for cough and shortness of breath.    Cardiovascular:  Negative for chest pain, palpitations and peripheral edema.   Gastrointestinal:  Negative for abdominal pain, constipation, diarrhea, heartburn, hematochezia and nausea.   Breasts:  Negative for tenderness, breast mass and discharge.   Genitourinary:   "Negative for dysuria, frequency, genital sores, hematuria, pelvic pain, urgency, vaginal bleeding and vaginal discharge.   Musculoskeletal:  Negative for arthralgias, joint swelling and myalgias.   Skin:  Negative for rash.   Neurological:  Negative for dizziness, weakness, headaches and paresthesias.   Psychiatric/Behavioral:  Negative for mood changes. The patient is not nervous/anxious.           OBJECTIVE:   /80 (BP Location: Right arm, Cuff Size: Adult Regular)   Pulse 65   Temp 98  F (36.7  C) (Tympanic)   Resp 12   Ht 1.715 m (5' 7.5\")   Wt 71.7 kg (158 lb)   LMP 10/19/2023 (Approximate)   SpO2 100%   BMI 24.38 kg/m    Physical Exam  GENERAL: healthy, alert and no distress  EYES: Eyes grossly normal to inspection, PERRL and conjunctivae and sclerae normal  HENT: ear canals and TM's normal, nose and mouth without ulcers or lesions  NECK: no adenopathy, no asymmetry, masses, or scars and thyroid normal to palpation  RESP: lungs clear to auscultation - no rales, rhonchi or wheezes  BREAST: normal without masses, tenderness or nipple discharge and no palpable axillary masses or adenopathy  CV: regular rate and rhythm, normal S1 S2, no S3 or S4, no murmur, click or rub, no peripheral edema and peripheral pulses strong  ABDOMEN: soft, nontender, no hepatosplenomegaly, no masses and bowel sounds normal  MS: no gross musculoskeletal defects noted, no edema  SKIN: no suspicious lesions or rashes  NEURO: Normal strength and tone, mentation intact and speech normal  PSYCH: mentation appears normal, affect normal/bright        ASSESSMENT/PLAN:       ICD-10-CM    1. Routine general medical examination at a health care facility  Z00.00       2. Migraine with aura and without status migrainosus, not intractable  G43.109 Rare.  Refilled medication to have on hand:  SUMAtriptan (IMITREX) 25 MG tablet      3. Menorrhagia with regular cycle  N92.0 Reviewed OB/GYN consult from last year and options that were " discussed.  We will repeat ultrasound and then patient will follow-up with OB/GYN.  US Pelvic Complete with Transvaginal          Patient has been advised of split billing requirements and indicates understanding: Yes by AFR and CMA      COUNSELING:  Reviewed preventive health counseling, as reflected in patient instructions        She reports that she has never smoked. She has never used smokeless tobacco.    The risks, benefits and treatment options of prescribed medications or other treatments have been discussed with the patient. The patient verbalized their understanding and should call or follow up if no improvement or if they develop further problems.  PENNY [facility]

## 2023-11-15 ENCOUNTER — PATIENT OUTREACH (OUTPATIENT)
Dept: CARE COORDINATION | Facility: CLINIC | Age: 53
End: 2023-11-15
Payer: COMMERCIAL

## 2023-12-13 ENCOUNTER — PATIENT OUTREACH (OUTPATIENT)
Dept: CARE COORDINATION | Facility: CLINIC | Age: 53
End: 2023-12-13
Payer: COMMERCIAL

## 2023-12-20 ENCOUNTER — HOSPITAL ENCOUNTER (OUTPATIENT)
Dept: ULTRASOUND IMAGING | Facility: CLINIC | Age: 53
Discharge: HOME | End: 2023-12-20
Attending: NURSE PRACTITIONER
Payer: COMMERCIAL

## 2023-12-20 ENCOUNTER — HOSPITAL ENCOUNTER (OUTPATIENT)
Dept: MAMMOGRAPHY | Facility: CLINIC | Age: 53
Discharge: HOME | End: 2023-12-20
Attending: NURSE PRACTITIONER
Payer: COMMERCIAL

## 2023-12-20 DIAGNOSIS — Z12.31 VISIT FOR SCREENING MAMMOGRAM: ICD-10-CM

## 2023-12-20 DIAGNOSIS — N92.0 MENORRHAGIA WITH REGULAR CYCLE: ICD-10-CM

## 2023-12-20 PROCEDURE — 76830 TRANSVAGINAL US NON-OB: CPT

## 2023-12-20 PROCEDURE — 77067 SCR MAMMO BI INCL CAD: CPT

## 2024-01-10 ENCOUNTER — MYC MEDICAL ADVICE (OUTPATIENT)
Dept: OBGYN | Facility: CLINIC | Age: 54
End: 2024-01-10

## 2024-01-10 ENCOUNTER — OFFICE VISIT (OUTPATIENT)
Dept: OBGYN | Facility: CLINIC | Age: 54
End: 2024-01-10
Payer: COMMERCIAL

## 2024-01-10 VITALS
BODY MASS INDEX: 24.71 KG/M2 | HEIGHT: 68 IN | TEMPERATURE: 97.4 F | SYSTOLIC BLOOD PRESSURE: 112 MMHG | HEART RATE: 59 BPM | WEIGHT: 163 LBS | DIASTOLIC BLOOD PRESSURE: 76 MMHG | RESPIRATION RATE: 18 BRPM

## 2024-01-10 DIAGNOSIS — N93.9 ABNORMAL UTERINE BLEEDING (AUB): Primary | ICD-10-CM

## 2024-01-10 PROCEDURE — 99215 OFFICE O/P EST HI 40 MIN: CPT | Performed by: OBSTETRICS & GYNECOLOGY

## 2024-01-10 NOTE — PROGRESS NOTES
Gynecology Consult Note      HPI: Kalina Schreiber is a 53 year old  who presents for bleeding.  The patient states that she has regular, monthly menses but that she has a longstanding history of heavy menses.  She states that she typically has 2 to 3 days of very heavy bleeding where she goes through 6-8 tampons and at times has to change tampons hourly.  She has not been on any hormonal contraception as her partner has had a vasectomy.  She had been trying to hold out until menopause but given the level of bleeding she is not sure she can wait any longer.  Does not complain of urinary or bowel symptoms.  Does not complain of dizziness or lightheadedness.  Notes that she is on iron for anemia.      ROS: 10 pt ROS neg other than HPI    PMH:   Past Medical History:   Diagnosis Date    Allergic rhinitis due to other allergen     Basal cell carcinoma     Solitary cyst of breast     Twin pregnancy, delivered        PSHx:   Past Surgical History:   Procedure Laterality Date    ABDOMEN SURGERY  1995    C-sections    BREAST SURGERY  10/2002    Augmentation    COLONOSCOPY  2012    Procedure:COLONOSCOPY; Colonoscopy  ; Surgeon:BOB TY; Location:[facility]    COLONOSCOPY N/A 2016    Procedure: COLONOSCOPY;  Surgeon: Markus Grossman MD;  Location: [facility]    COSMETIC SURGERY      Breast augmentation    ESOPHAGOSCOPY, GASTROSCOPY, DUODENOSCOPY (EGD), COMBINED N/A 2016    Procedure: COMBINED ESOPHAGOSCOPY, GASTROSCOPY, DUODENOSCOPY (EGD);  Surgeon: Markus Grossman MD;  Location: [facility]    LASER SURGERY OF EYE  2008    Both eyes    MAMMOPLASTY AUGMENTATION      SURGICAL HISTORY OF -       T&A    SURGICAL HISTORY OF -       Csection    SURGICAL HISTORY OF -       breast augmentation       Medications:   ferrous sulfate (IRON) 325 (65 FE) MG tablet, Take 1 tablet (325 mg) by mouth 2 times daily  fexofenadine (ALLEGRA) 180 MG tablet, Take 1 tablet (180 mg) by mouth daily  (Patient taking differently: Take 180 mg by mouth daily as needed)  fluocinonide (LIDEX) 0.05 % external solution, Apply twice daily as needed to frontal scalp. (Patient taking differently: Three days per week)  fluticasone (FLONASE) 50 MCG/ACT nasal spray, Spray 1 spray into both nostrils daily  ibuprofen (ADVIL/MOTRIN) 200 MG capsule, Take 200 mg by mouth every 4 hours as needed  metoprolol succinate ER (TOPROL XL) 25 MG 24 hr tablet, Take 2 tablets (50 mg) by mouth daily  Oxymetazoline HCl (NASAL SPRAY NA), Nasoquort 1 spray in each nostril daily when needed  SUMAtriptan (IMITREX) 25 MG tablet, Take 1-2 tablets (25-50 mg) by mouth at onset of headache for migraine May repeat dose in 2 hours.  Do not exceed 200 mg in 24 hours  UNKNOWN TO PATIENT, Allergy Eye Drops, 1 drop in each eye daily PRN    No current facility-administered medications on file prior to visit.       Allergies:      Allergies   Allergen Reactions    Nkda [No Known Drug Allergy]        Social History:   Social History     Socioeconomic History    Marital status:      Spouse name: Not on file    Number of children: Not on file    Years of education: Not on file    Highest education level: Not on file   Occupational History     Employer: SELF   Tobacco Use    Smoking status: Never    Smokeless tobacco: Never   Vaping Use    Vaping Use: Never used   Substance and Sexual Activity    Alcohol use: Yes     Comment: 0-2 drinks per day    Drug use: No    Sexual activity: Yes     Partners: Male     Birth control/protection: Male Surgical     Comment: vas   Other Topics Concern    Parent/sibling w/ CABG, MI or angioplasty before 65F 55M? 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      Surrogate for twins     Social Determinants of Health     Financial Resource Strain: Low Risk  (11/8/2023)    Financial Resource Strain     Within the past 12 months, have you or your family members you live with been unable to  get utilities (heat, electricity) when it was really needed?: No   Food Insecurity: Low Risk  (11/8/2023)    Food Insecurity     Within the past 12 months, did you worry that your food would run out before you got money to buy more?: No     Within the past 12 months, did the food you bought just not last and you didn t have money to get more?: No   Transportation Needs: Low Risk  (11/8/2023)    Transportation Needs     Within the past 12 months, has lack of transportation kept you from medical appointments, getting your medicines, non-medical meetings or appointments, work, or from getting things that you need?: No   Physical Activity: Not on file   Stress: Not on file   Social Connections: Not on file   Interpersonal Safety: Low Risk  (11/9/2023)    Interpersonal Safety     Do you feel physically and emotionally safe where you currently live?: Yes     Within the past 12 months, have you been hit, slapped, kicked or otherwise physically hurt by someone?: No     Within the past 12 months, have you been humiliated or emotionally abused in other ways by your partner or ex-partner?: No   Housing Stability: Low Risk  (11/8/2023)    Housing Stability     Do you have housing? : Yes     Are you worried about losing your housing?: No       Family History:  Family History   Problem Relation Age of Onset    Alcohol/Drug Mother     Cancer Mother         skin, COD: lung, liver, bone cancer    Melanoma Mother     Diabetes Mother         Post transplant onset    Other Cancer Mother         Lung, liver, bone    Alcohol/Drug Father     Hypertension Father     Cancer Maternal Grandmother         lung/liver    Asthma Maternal Grandmother     Other Cancer Maternal Grandmother         Lung, liver, bone    Cancer Maternal Grandfather         skin    Cancer - colorectal Maternal Grandfather     Melanoma Maternal Grandfather     Colon Cancer Maternal Grandfather     Coronary Artery Disease Maternal Grandfather     Cerebrovascular Disease  "No family hx of     Breast Cancer No family hx of     Prostate Cancer No family hx of        Physical Exam:   Vitals:    01/10/24 0833   BP: 112/76   BP Location: Right arm   Patient Position: Chair   Cuff Size: Adult Regular   Pulse: 59   Resp: 18   Temp: 97.4  F (36.3  C)   TempSrc: Tympanic   Weight: 73.9 kg (163 lb)   Height: 1.715 m (5' 7.5\")      Gen: lying in bed, NAD  CV: Reg rate, well perfused  Pulm: no increased work of breathing  Abd: non-tender, non-distended, no masses   Pelvis: deferred given plan for EMB IUD vs ablation  Extremities: non-tender, no erythema; no edema  Psych: normal mood and affect  Neuro: no focal deficits    Imaging: pelvic US  UTERUS: 11.4 x 7.5 x 5.2 cm. Normal in size and position with no  masses. Possible partial septate versus arcuate uterus.     ENDOMETRIUM: 11 mm. Smooth endometrium. No discrete mass or polyp.     RIGHT OVARY: 2.9 x 2.8 x 1.7 cm. Normal with flow demonstrated.     LEFT OVARY: 3 x 2 x 1.9 cm. Normal with flow demonstrated.     No significant free fluid.                                                                      IMPRESSION:  Nonspecific endometrial thickening measuring 11 mm.       A&P: Kalina Schreiber is a 53 year old  who presents for abnormal uterine bleeding.  The patient does have regular, monthly menses but has had menorrhagia.  She had recent ultrasound that was notable for normal size uterus without fibroids and normal ovaries.  Her endometrium is does not show signs of polyp or mass.  Reviewed with patient at length management of menorrhagia.  Discussed that we can certainly check an FSH level to see if this helps us understand if she may be nearing menopause.  This test is not perfect.  Also discussed TSH and hemoglobin levels to confirm that these are within normal range.  Finally reviewed with patient both medical and surgical options.  First reviewed medical options.  She is not a good candidate for estrogen containing therapy " given her age and history of migraine with aura.  Reviewed with her progesterone only options including IUD, Nexplanon, Depo, progesterone only pill.  Also reviewed nonhormonal option of TXA.  Finally reviewed surgical options including ablation and hysterectomy.  With each of the above options reviewed with patient at length expected bleeding pattern, side effects, procedures.  After discussion the patient is unsure exactly how she would like to proceed.  She is considering tranexamic acid versus IUD versus ablation.  Of note patient does have ultrasound suggestive of possible septate versus arcuate uterus.  Patient has had  x 2 with no mention of septum and therefore discussed that more likely to be related to arcuate shape.  Discussed that this may impact the effectiveness of an ablation and would certainly recommend hysteroscopy at the time of an ablation to confirm that the procedure was safe and likely to offer benefit.  Patient states understanding.  She will consider the above options and let us know how she would like to proceed.  Did offer exam today but given plan for endometrial biopsy with either ablation or IUD insertion will plan to complete that at the time of procedures.  All questions were answered.    I spent a total of 40 minutes reviewing chart, obtaining history, counseling, examining, coordinating care, documenting this encounter.    Nat Seugra MD   1/10/2024 10:35 AM

## 2024-01-10 NOTE — NURSING NOTE
"Initial /76 (BP Location: Right arm, Patient Position: Chair, Cuff Size: Adult Regular)   Pulse 59   Temp 97.4  F (36.3  C) (Tympanic)   Resp 18   Ht 1.715 m (5' 7.5\")   Wt 73.9 kg (163 lb)   LMP 12/30/2023   BMI 25.15 kg/m   Estimated body mass index is 25.15 kg/m  as calculated from the following:    Height as of this encounter: 1.715 m (5' 7.5\").    Weight as of this encounter: 73.9 kg (163 lb). .    "

## 2024-01-30 ENCOUNTER — OFFICE VISIT (OUTPATIENT)
Dept: OBGYN | Facility: CLINIC | Age: 54
End: 2024-01-30
Payer: COMMERCIAL

## 2024-01-30 VITALS
WEIGHT: 163 LBS | DIASTOLIC BLOOD PRESSURE: 77 MMHG | SYSTOLIC BLOOD PRESSURE: 124 MMHG | RESPIRATION RATE: 18 BRPM | HEART RATE: 59 BPM | HEIGHT: 68 IN | BODY MASS INDEX: 24.71 KG/M2 | TEMPERATURE: 98.1 F

## 2024-01-30 DIAGNOSIS — Z53.8 UNSUCCESSFUL IUD INSERTION: Primary | ICD-10-CM

## 2024-01-30 LAB
HCG UR QL: NEGATIVE
INTERNAL QC OK POCT: NORMAL
POCT KIT EXPIRATION DATE: NORMAL
POCT KIT LOT NUMBER: NORMAL

## 2024-01-30 PROCEDURE — 58300 INSERT INTRAUTERINE DEVICE: CPT | Mod: 52 | Performed by: ADVANCED PRACTICE MIDWIFE

## 2024-01-30 PROCEDURE — 81025 URINE PREGNANCY TEST: CPT | Performed by: ADVANCED PRACTICE MIDWIFE

## 2024-01-30 NOTE — NURSING NOTE
"Initial /77 (BP Location: Left arm, Patient Position: Chair, Cuff Size: Adult Regular)   Pulse 59   Temp 98.1  F (36.7  C) (Tympanic)   Resp 18   Ht 1.715 m (5' 7.5\")   Wt 73.9 kg (163 lb)   LMP 01/23/2024   BMI 25.15 kg/m   Estimated body mass index is 25.15 kg/m  as calculated from the following:    Height as of this encounter: 1.715 m (5' 7.5\").    Weight as of this encounter: 73.9 kg (163 lb). .    "

## 2024-01-30 NOTE — PROGRESS NOTES
"Unsuccessful IUD Insertion:  CONSULT:    Is a pregnancy test required: Yes.  Was it positive or negative?  Negative  Was a consent obtained?  Yes    Subjective: Kalina Schreiber is a 53 year old  presents for IUD and desires Mirena type IUD.    Patient has been given the opportunity to ask questions about all forms of birth control, including all options appropriate for Kalina Schreiber. Discussed that no method of birth control, except abstinence is 100% effective against pregnancy or sexually transmitted infection.     Kalina Schreiber understands she may have the IUD removed at any time. IUD should be removed by a health care provider.    The entire insertion procedure was reviewed with the patient, including care after placement.    Patient's last menstrual period was 2024. No allergy to betadine or shellfish.  HCG Qual Urine   Date Value Ref Range Status   2024 Negative Negative Final         /77 (BP Location: Left arm, Patient Position: Chair, Cuff Size: Adult Regular)   Pulse 59   Temp 98.1  F (36.7  C) (Tympanic)   Resp 18   Ht 1.715 m (5' 7.5\")   Wt 73.9 kg (163 lb)   LMP 2024   BMI 25.15 kg/m      Pelvic Exam:   EG/BUS: normal genital architecture without lesions, erythema or abnormal secretions.   Vagina: moist, pink, rugae with physiologic discharge and secretions  Cervix: no lesions and pink, moist, closed, without lesion or CMT  Uterus: mobile, no pain  Adnexa: within normal limits and no masses, nodularity, tenderness    PROCEDURE NOTE: -- IUD Insertion    Reason for Insertion: contraception and abnormal uterine bleeding, heavy bleeding with regular periods     Under sterile technique, cervix was visualized with speculum and prepped with Betadine solution swab x 3. Tenaculum was placed for stability. After several attempts with patient's consent, I was unable to pass the internal uterine os with a metal sound or a dilator.    EBL: minimal    Complications: " none    ASSESSMENT:     ICD-10-CM    1. Unsuccessful IUD insertion  Z53.8              PLAN:    Please make an appt for placement with and OB Gyn MD to re-attempt placement. If IUD cannot be placed, another plan can be made.      Advised to call for any fever, for prolonged or severe pain or bleeding, abnormal vaginal discharge, or unable to palpate strings. She was advised to use pain medications (ibuprofen) as needed for mild to moderate pain.     Eleonora Kee CNM

## 2024-02-22 ENCOUNTER — OFFICE VISIT (OUTPATIENT)
Dept: OBGYN | Facility: CLINIC | Age: 54
End: 2024-02-22
Payer: COMMERCIAL

## 2024-02-22 ENCOUNTER — PREP FOR PROCEDURE (OUTPATIENT)
Dept: OBGYN | Facility: CLINIC | Age: 54
End: 2024-02-22

## 2024-02-22 ENCOUNTER — TELEPHONE (OUTPATIENT)
Dept: OBGYN | Facility: CLINIC | Age: 54
End: 2024-02-22

## 2024-02-22 VITALS
RESPIRATION RATE: 18 BRPM | DIASTOLIC BLOOD PRESSURE: 77 MMHG | HEART RATE: 63 BPM | WEIGHT: 163 LBS | SYSTOLIC BLOOD PRESSURE: 119 MMHG | TEMPERATURE: 97.7 F | HEIGHT: 68 IN | BODY MASS INDEX: 24.71 KG/M2

## 2024-02-22 DIAGNOSIS — N93.9 ABNORMAL UTERINE BLEEDING (AUB): Primary | ICD-10-CM

## 2024-02-22 DIAGNOSIS — N93.9 ABNORMAL UTERINE BLEEDING (AUB): ICD-10-CM

## 2024-02-22 DIAGNOSIS — Z30.430 ENCOUNTER FOR IUD INSERTION: Primary | ICD-10-CM

## 2024-02-22 PROCEDURE — 81025 URINE PREGNANCY TEST: CPT | Performed by: OBSTETRICS & GYNECOLOGY

## 2024-02-22 PROCEDURE — 58100 BIOPSY OF UTERUS LINING: CPT | Performed by: OBSTETRICS & GYNECOLOGY

## 2024-02-22 PROCEDURE — 58300 INSERT INTRAUTERINE DEVICE: CPT | Mod: 51 | Performed by: OBSTETRICS & GYNECOLOGY

## 2024-02-22 PROCEDURE — 88305 TISSUE EXAM BY PATHOLOGIST: CPT | Performed by: PATHOLOGY

## 2024-02-22 RX ORDER — ACETAMINOPHEN 325 MG/1
975 TABLET ORAL ONCE
Status: CANCELLED | OUTPATIENT
Start: 2024-02-22 | End: 2024-02-22

## 2024-02-22 NOTE — NURSING NOTE
"Initial /77 (BP Location: Right arm, Patient Position: Chair, Cuff Size: Adult Regular)   Pulse 63   Temp 97.7  F (36.5  C) (Tympanic)   Resp 18   Ht 1.715 m (5' 7.5\")   Wt 73.9 kg (163 lb)   LMP 02/18/2024   BMI 25.15 kg/m   Estimated body mass index is 25.15 kg/m  as calculated from the following:    Height as of this encounter: 1.715 m (5' 7.5\").    Weight as of this encounter: 73.9 kg (163 lb). .    "

## 2024-02-22 NOTE — PROGRESS NOTES
Pt presents for IUD insertion and EMB in the setting of AUB after previous discussion of options for management      IUD Placement Attempt and Endometrial Biopsy Procedure Note    Kalina Schreiber  1970  [id number]    The patient was counseled on the risks (including including risk of infection, uterine perforation, cramping), benefits (high efficacy, low maintenance birth control, reduced risk of uterine cancer and hyperplasia, improvement in menstrual bleeding), and alternatives of the procedure. Verbal and written consent were obtained.  A UPT was negative prior to the procedure.    Technique: The patient was placed in the dorsal lithotomy position.  A speculum was placed in the vagina and the cervix was cleaned with betadine swabsx3. The anterior cervical lip was grasped with a tenaculum.  The internal cervical os was noted to be quite stenotic and was able to pass a test finder as well as complete an endometrial biopsy.  However, with multiple attempts was unable to pass Mirena IUD insertion device through internal cervical os and thus IUD insertion portion of the procedure was abandoned.      Discussed with patient options moving forward.  She is then deciding between the completing hysteroscopy with ablation versus IUD under anesthesia.  After discussion the patient wished to proceed with hysteroscopy and IUD insertion.  Orders placed.    Nat Segura MD  2/22/2024 1:54 PM

## 2024-02-22 NOTE — TELEPHONE ENCOUNTER
"[id number]  Kalina Schreiber    You are now scheduled for surgery at [facility].  Below are the details for your surgery.  Please read the \"Preparing for Your Surgery\" instructions and let us know if you have any questions.    Type of surgery: HYSTEROSCOPY, WITH DILATION AND CURETTAGE OF UTERUS, mirena IUD insertion   Surgeon:  Nat Segura MD  Location of surgery: [facility] OR    Date of surgery: 2/28/24    Time: 11:00 Am   Arrival Time: 9:30 AM    Time can change, to be confirmed a couple of days prior by pre-op surgery nurse.    Pre-Op Appt Date: Not needed; just saw provider on 2/22/24  Post-Op Appt Date: Not needed      Packet sent out: Yes  Pre-cert/Authorization completed:  TBD by Financial Securing Office.   MA Sterilization/Hysterectomy Acknowledgment Consent signed: Not Applicable    [facility]  [phone]    Fax: [phone]  Same Day Surgery [phone]  Fax: [phone]  [facility] [phone]    "

## 2024-02-26 ENCOUNTER — ANESTHESIA EVENT (OUTPATIENT)
Dept: SURGERY | Facility: CLINIC | Age: 54
End: 2024-02-26
Payer: COMMERCIAL

## 2024-02-26 NOTE — ANESTHESIA PREPROCEDURE EVALUATION
Anesthesia Pre-Procedure Evaluation    Patient: Kalina Schreiber   MRN: [id number] : 1970        Procedure : Procedure(s):  HYSTEROSCOPY, WITH DILATION AND CURETTAGE OF UTERUS  Mirena  intrauterine device Insertion          Past Medical History:   Diagnosis Date    Allergic rhinitis due to other allergen     Basal cell carcinoma     Solitary cyst of breast     Twin pregnancy, delivered       Past Surgical History:   Procedure Laterality Date    ABDOMEN SURGERY  1995    C-sections    BREAST SURGERY  10/2002    Augmentation    COLONOSCOPY  2012    Procedure:COLONOSCOPY; Colonoscopy  ; Surgeon:BOB TY; Location:[facility]    COLONOSCOPY N/A 2016    Procedure: COLONOSCOPY;  Surgeon: Markus Grossman MD;  Location: [facility]    COSMETIC SURGERY      Breast augmentation    ESOPHAGOSCOPY, GASTROSCOPY, DUODENOSCOPY (EGD), COMBINED N/A 2016    Procedure: COMBINED ESOPHAGOSCOPY, GASTROSCOPY, DUODENOSCOPY (EGD);  Surgeon: Markus Grossman MD;  Location: [facility]    LASER SURGERY OF EYE  2008    Both eyes    MAMMOPLASTY AUGMENTATION      SURGICAL HISTORY OF -       T&A    SURGICAL HISTORY OF -       Csection    SURGICAL HISTORY OF -       breast augmentation      Allergies   Allergen Reactions    Nkda [No Known Drug Allergy]       Social History     Tobacco Use    Smoking status: Never    Smokeless tobacco: Never   Substance Use Topics    Alcohol use: Yes     Comment: 0-2 drinks per day      Wt Readings from Last 1 Encounters:   24 73.9 kg (163 lb)        Anesthesia Evaluation   Pt has had prior anesthetic.         ROS/MED HX  ENT/Pulmonary:     (+)           allergic rhinitis,                             Neurologic:     (+)      migraines,                          Cardiovascular:     (+) Dyslipidemia - -   -  - -                                      METS/Exercise Tolerance:     Hematologic:     (+)      anemia,          Musculoskeletal:   (+)  arthritis,            "  GI/Hepatic:     (+) GERD,                   Renal/Genitourinary:       Endo:       Psychiatric/Substance Use:       Infectious Disease:       Malignancy:   (+) Malignancy, History of Skin.Skin CA Remission status post Surgery.      Other:          Physical Exam    Airway        Mallampati: I   TM distance: > 3 FB   Neck ROM: full   Mouth opening: > 3 cm    Respiratory Devices and Support         Dental       (+) Completely normal teeth      Cardiovascular   cardiovascular exam normal          Pulmonary   pulmonary exam normal                OUTSIDE LABS:  CBC:   Lab Results   Component Value Date    WBC 5.5 01/31/2023    WBC 4.9 08/13/2021    HGB 13.8 01/31/2023    HGB 13.1 08/13/2021    HCT 42.3 01/31/2023    HCT 40.2 08/13/2021     01/31/2023     08/13/2021     BMP:   Lab Results   Component Value Date     01/31/2023     08/13/2021    POTASSIUM 3.9 01/31/2023    POTASSIUM 3.7 08/13/2021    CHLORIDE 104 01/31/2023    CHLORIDE 105 08/13/2021    CO2 24 01/31/2023    CO2 28 08/13/2021    BUN 15.0 01/31/2023    BUN 14 08/13/2021    CR 0.92 01/31/2023    CR 0.95 08/13/2021    GLC 76 01/31/2023    GLC 60 (L) 08/13/2021     COAGS: No results found for: \"PTT\", \"INR\", \"FIBR\"  POC:   Lab Results   Component Value Date    HCG Negative 02/22/2024     HEPATIC:   Lab Results   Component Value Date    ALBUMIN 4.2 01/31/2023    PROTTOTAL 6.7 01/31/2023    ALT 16 01/31/2023    AST 13 01/31/2023    ALKPHOS 56 01/31/2023    BILITOTAL 0.4 01/31/2023     OTHER:   Lab Results   Component Value Date    AUSTEN 8.7 01/31/2023    MAG 2.3 11/22/2010    TSH 4.39 03/19/2011    T4 0.95 11/22/2010       Anesthesia Plan    ASA Status:  2    NPO Status:  NPO Appropriate    Anesthesia Type: General.     - Airway: Native airway   Induction: Intravenous, Propofol.   Maintenance: Balanced.        Consents    Anesthesia Plan(s) and associated risks, benefits, and realistic alternatives discussed. Questions answered and " "patient/representative(s) expressed understanding.     - Discussed: Risks, Benefits and Alternatives for BOTH SEDATION and the PROCEDURE were discussed     - Discussed with:  Patient            Postoperative Care    Pain management: Multi-modal analgesia.   PONV prophylaxis: Ondansetron (or other 5HT-3), Dexamethasone or Solumedrol, Background Propofol Infusion     Comments:               PENNY Vale CRNA    I have reviewed the pertinent notes and labs in the chart from the past 30 days and (re)examined the patient.  Any updates or changes from those notes are reflected in this note.              # Overweight: Estimated body mass index is 25.15 kg/m  as calculated from the following:    Height as of 2/22/24: 1.715 m (5' 7.5\").    Weight as of 2/22/24: 73.9 kg (163 lb).      "

## 2024-02-27 LAB
PATH REPORT.COMMENTS IMP SPEC: NORMAL
PATH REPORT.COMMENTS IMP SPEC: NORMAL
PATH REPORT.FINAL DX SPEC: NORMAL
PATH REPORT.GROSS SPEC: NORMAL
PATH REPORT.MICROSCOPIC SPEC OTHER STN: NORMAL
PATH REPORT.RELEVANT HX SPEC: NORMAL
PHOTO IMAGE: NORMAL

## 2024-02-28 ENCOUNTER — HOSPITAL ENCOUNTER (OUTPATIENT)
Facility: CLINIC | Age: 54
Discharge: HOME | End: 2024-02-28
Attending: OBSTETRICS & GYNECOLOGY | Admitting: OBSTETRICS & GYNECOLOGY
Payer: COMMERCIAL

## 2024-02-28 ENCOUNTER — ANESTHESIA (OUTPATIENT)
Dept: SURGERY | Facility: CLINIC | Age: 54
End: 2024-02-28
Payer: COMMERCIAL

## 2024-02-28 VITALS
DIASTOLIC BLOOD PRESSURE: 80 MMHG | TEMPERATURE: 98 F | OXYGEN SATURATION: 100 % | BODY MASS INDEX: 24.71 KG/M2 | WEIGHT: 163 LBS | SYSTOLIC BLOOD PRESSURE: 111 MMHG | HEART RATE: 56 BPM | RESPIRATION RATE: 16 BRPM | HEIGHT: 68 IN

## 2024-02-28 LAB
HCG UR QL: NEGATIVE
HGB BLD-MCNC: 13.3 G/DL (ref 11.7–15.7)

## 2024-02-28 PROCEDURE — 258N000003 HC RX IP 258 OP 636

## 2024-02-28 PROCEDURE — 88305 TISSUE EXAM BY PATHOLOGIST: CPT | Mod: 26 | Performed by: PATHOLOGY

## 2024-02-28 PROCEDURE — 271N000001 HC OR GENERAL SUPPLY NON-STERILE: Performed by: OBSTETRICS & GYNECOLOGY

## 2024-02-28 PROCEDURE — 250N000009 HC RX 250

## 2024-02-28 PROCEDURE — 999N000141 HC STATISTIC PRE-PROCEDURE NURSING ASSESSMENT: Performed by: OBSTETRICS & GYNECOLOGY

## 2024-02-28 PROCEDURE — 85018 HEMOGLOBIN: CPT | Performed by: OBSTETRICS & GYNECOLOGY

## 2024-02-28 PROCEDURE — 250N000011 HC RX IP 250 OP 636: Performed by: OBSTETRICS & GYNECOLOGY

## 2024-02-28 PROCEDURE — 258N000003 HC RX IP 258 OP 636: Performed by: NURSE ANESTHETIST, CERTIFIED REGISTERED

## 2024-02-28 PROCEDURE — 250N000013 HC RX MED GY IP 250 OP 250 PS 637: Performed by: NURSE ANESTHETIST, CERTIFIED REGISTERED

## 2024-02-28 PROCEDURE — 250N000013 HC RX MED GY IP 250 OP 250 PS 637: Performed by: OBSTETRICS & GYNECOLOGY

## 2024-02-28 PROCEDURE — 360N000076 HC SURGERY LEVEL 3, PER MIN: Performed by: OBSTETRICS & GYNECOLOGY

## 2024-02-28 PROCEDURE — 88305 TISSUE EXAM BY PATHOLOGIST: CPT | Mod: TC | Performed by: OBSTETRICS & GYNECOLOGY

## 2024-02-28 PROCEDURE — 250N000011 HC RX IP 250 OP 636

## 2024-02-28 PROCEDURE — 81025 URINE PREGNANCY TEST: CPT | Performed by: OBSTETRICS & GYNECOLOGY

## 2024-02-28 PROCEDURE — 370N000017 HC ANESTHESIA TECHNICAL FEE, PER MIN: Performed by: OBSTETRICS & GYNECOLOGY

## 2024-02-28 PROCEDURE — 36415 COLL VENOUS BLD VENIPUNCTURE: CPT | Performed by: OBSTETRICS & GYNECOLOGY

## 2024-02-28 PROCEDURE — 272N000001 HC OR GENERAL SUPPLY STERILE: Performed by: OBSTETRICS & GYNECOLOGY

## 2024-02-28 PROCEDURE — 710N000012 HC RECOVERY PHASE 2, PER MINUTE: Performed by: OBSTETRICS & GYNECOLOGY

## 2024-02-28 PROCEDURE — 58999 UNLISTED PX FML GENITAL SYS: CPT | Mod: 51 | Performed by: OBSTETRICS & GYNECOLOGY

## 2024-02-28 PROCEDURE — 58558 HYSTEROSCOPY BIOPSY: CPT | Performed by: OBSTETRICS & GYNECOLOGY

## 2024-02-28 DEVICE — IUD CONTRACEPTIVE DEVICE MIRENA 50419-4230-01: Type: IMPLANTABLE DEVICE | Site: VAGINA | Status: FUNCTIONAL

## 2024-02-28 RX ORDER — LIDOCAINE HYDROCHLORIDE 20 MG/ML
INJECTION, SOLUTION INFILTRATION; PERINEURAL PRN
Status: DISCONTINUED | OUTPATIENT
Start: 2024-02-28 | End: 2024-02-28

## 2024-02-28 RX ORDER — GLYCOPYRROLATE 0.2 MG/ML
INJECTION, SOLUTION INTRAMUSCULAR; INTRAVENOUS PRN
Status: DISCONTINUED | OUTPATIENT
Start: 2024-02-28 | End: 2024-02-28

## 2024-02-28 RX ORDER — DEXAMETHASONE SODIUM PHOSPHATE 4 MG/ML
INJECTION, SOLUTION INTRA-ARTICULAR; INTRALESIONAL; INTRAMUSCULAR; INTRAVENOUS; SOFT TISSUE PRN
Status: DISCONTINUED | OUTPATIENT
Start: 2024-02-28 | End: 2024-02-28

## 2024-02-28 RX ORDER — FENTANYL CITRATE 50 UG/ML
INJECTION, SOLUTION INTRAMUSCULAR; INTRAVENOUS PRN
Status: DISCONTINUED | OUTPATIENT
Start: 2024-02-28 | End: 2024-02-28

## 2024-02-28 RX ORDER — ACETAMINOPHEN 325 MG/1
975 TABLET ORAL ONCE
Status: DISCONTINUED | OUTPATIENT
Start: 2024-02-28 | End: 2024-02-28 | Stop reason: HOSPADM

## 2024-02-28 RX ORDER — ONDANSETRON 2 MG/ML
INJECTION INTRAMUSCULAR; INTRAVENOUS PRN
Status: DISCONTINUED | OUTPATIENT
Start: 2024-02-28 | End: 2024-02-28

## 2024-02-28 RX ORDER — OXYCODONE HYDROCHLORIDE 5 MG/1
5 TABLET ORAL
Status: DISCONTINUED | OUTPATIENT
Start: 2024-02-28 | End: 2024-02-28 | Stop reason: HOSPADM

## 2024-02-28 RX ORDER — LIDOCAINE 40 MG/G
CREAM TOPICAL
Status: DISCONTINUED | OUTPATIENT
Start: 2024-02-28 | End: 2024-02-28 | Stop reason: HOSPADM

## 2024-02-28 RX ORDER — NALOXONE HYDROCHLORIDE 0.4 MG/ML
0.1 INJECTION, SOLUTION INTRAMUSCULAR; INTRAVENOUS; SUBCUTANEOUS
Status: DISCONTINUED | OUTPATIENT
Start: 2024-02-28 | End: 2024-02-28 | Stop reason: HOSPADM

## 2024-02-28 RX ORDER — ONDANSETRON 4 MG/1
4 TABLET, ORALLY DISINTEGRATING ORAL EVERY 30 MIN PRN
Status: DISCONTINUED | OUTPATIENT
Start: 2024-02-28 | End: 2024-02-28 | Stop reason: HOSPADM

## 2024-02-28 RX ORDER — SODIUM CHLORIDE, SODIUM LACTATE, POTASSIUM CHLORIDE, CALCIUM CHLORIDE 600; 310; 30; 20 MG/100ML; MG/100ML; MG/100ML; MG/100ML
INJECTION, SOLUTION INTRAVENOUS CONTINUOUS
Status: DISCONTINUED | OUTPATIENT
Start: 2024-02-28 | End: 2024-02-28 | Stop reason: HOSPADM

## 2024-02-28 RX ORDER — IBUPROFEN 400 MG/1
800 TABLET, FILM COATED ORAL ONCE
Status: COMPLETED | OUTPATIENT
Start: 2024-02-28 | End: 2024-02-28

## 2024-02-28 RX ORDER — PROPOFOL 10 MG/ML
INJECTION, EMULSION INTRAVENOUS CONTINUOUS PRN
Status: DISCONTINUED | OUTPATIENT
Start: 2024-02-28 | End: 2024-02-28

## 2024-02-28 RX ORDER — BUPIVACAINE HYDROCHLORIDE 2.5 MG/ML
INJECTION, SOLUTION INFILTRATION; PERINEURAL PRN
Status: DISCONTINUED | OUTPATIENT
Start: 2024-02-28 | End: 2024-02-28 | Stop reason: HOSPADM

## 2024-02-28 RX ORDER — ONDANSETRON 2 MG/ML
4 INJECTION INTRAMUSCULAR; INTRAVENOUS EVERY 30 MIN PRN
Status: DISCONTINUED | OUTPATIENT
Start: 2024-02-28 | End: 2024-02-28 | Stop reason: HOSPADM

## 2024-02-28 RX ORDER — GABAPENTIN 300 MG/1
300 CAPSULE ORAL
Status: DISCONTINUED | OUTPATIENT
Start: 2024-02-28 | End: 2024-02-28 | Stop reason: HOSPADM

## 2024-02-28 RX ORDER — PROPOFOL 10 MG/ML
INJECTION, EMULSION INTRAVENOUS PRN
Status: DISCONTINUED | OUTPATIENT
Start: 2024-02-28 | End: 2024-02-28

## 2024-02-28 RX ORDER — ACETAMINOPHEN 325 MG/1
975 TABLET ORAL ONCE
Status: DISCONTINUED | OUTPATIENT
Start: 2024-02-28 | End: 2024-02-28

## 2024-02-28 RX ORDER — ACETAMINOPHEN 325 MG/1
975 TABLET ORAL ONCE
Status: COMPLETED | OUTPATIENT
Start: 2024-02-28 | End: 2024-02-28

## 2024-02-28 RX ORDER — OXYCODONE HYDROCHLORIDE 5 MG/1
5 TABLET ORAL
Status: DISCONTINUED | OUTPATIENT
Start: 2024-02-28 | End: 2024-02-28

## 2024-02-28 RX ORDER — OXYCODONE HYDROCHLORIDE 5 MG/1
10 TABLET ORAL
Status: DISCONTINUED | OUTPATIENT
Start: 2024-02-28 | End: 2024-02-28 | Stop reason: HOSPADM

## 2024-02-28 RX ADMIN — FENTANYL CITRATE 50 MCG: 50 INJECTION INTRAMUSCULAR; INTRAVENOUS at 09:31

## 2024-02-28 RX ADMIN — DEXAMETHASONE SODIUM PHOSPHATE 4 MG: 4 INJECTION, SOLUTION INTRA-ARTICULAR; INTRALESIONAL; INTRAMUSCULAR; INTRAVENOUS; SOFT TISSUE at 09:33

## 2024-02-28 RX ADMIN — LIDOCAINE HYDROCHLORIDE 50 MG: 20 INJECTION, SOLUTION INFILTRATION; PERINEURAL at 09:33

## 2024-02-28 RX ADMIN — SODIUM CHLORIDE, POTASSIUM CHLORIDE, SODIUM LACTATE AND CALCIUM CHLORIDE: 600; 310; 30; 20 INJECTION, SOLUTION INTRAVENOUS at 08:27

## 2024-02-28 RX ADMIN — GLYCOPYRROLATE 0.2 MG: 0.2 INJECTION, SOLUTION INTRAMUSCULAR; INTRAVENOUS at 09:58

## 2024-02-28 RX ADMIN — IBUPROFEN 800 MG: 400 TABLET ORAL at 11:01

## 2024-02-28 RX ADMIN — ACETAMINOPHEN 975 MG: 325 TABLET, FILM COATED ORAL at 08:28

## 2024-02-28 RX ADMIN — PROPOFOL 130 MCG/KG/MIN: 10 INJECTION, EMULSION INTRAVENOUS at 09:33

## 2024-02-28 RX ADMIN — PHENYLEPHRINE HYDROCHLORIDE 50 MCG: 10 INJECTION INTRAVENOUS at 09:50

## 2024-02-28 RX ADMIN — MIDAZOLAM 2 MG: 1 INJECTION INTRAMUSCULAR; INTRAVENOUS at 09:31

## 2024-02-28 RX ADMIN — ONDANSETRON 4 MG: 2 INJECTION INTRAMUSCULAR; INTRAVENOUS at 09:44

## 2024-02-28 RX ADMIN — PHENYLEPHRINE HYDROCHLORIDE 50 MCG: 10 INJECTION INTRAVENOUS at 09:56

## 2024-02-28 RX ADMIN — PROPOFOL 30 MG: 10 INJECTION, EMULSION INTRAVENOUS at 09:33

## 2024-02-28 ASSESSMENT — ACTIVITIES OF DAILY LIVING (ADL)
ADLS_ACUITY_SCORE: 18

## 2024-02-28 NOTE — ANESTHESIA POSTPROCEDURE EVALUATION
Patient: Kalina Schreiber    Procedure: Procedure(s):  HYSTEROSCOPY, WITH DILATION AND CURETTAGE OF UTERUS  Mirena  intrauterine device Insertion       Anesthesia Type:  General    Note:  Disposition: Outpatient   Postop Pain Control: Uneventful            Sign Out: Well controlled pain   PONV: No   Neuro/Psych: Uneventful            Sign Out: Acceptable/Baseline neuro status   Airway/Respiratory: Uneventful            Sign Out: Acceptable/Baseline resp. status   CV/Hemodynamics: Uneventful            Sign Out: Acceptable CV status; No obvious hypovolemia; No obvious fluid overload   Other NRE: NONE   DID A NON-ROUTINE EVENT OCCUR? No           Last vitals:  Vitals:    02/28/24 0753   BP: 130/89   Pulse: 64   Temp: 36.7  C (98  F)   SpO2: 100%       Electronically Signed By: PENNY Rosales CRNA  February 28, 2024  10:13 AM

## 2024-02-28 NOTE — ANESTHESIA CARE TRANSFER NOTE
Patient: Kalina Schreiber    Procedure: Procedure(s):  HYSTEROSCOPY, WITH DILATION AND CURETTAGE OF UTERUS  Mirena  intrauterine device Insertion       Diagnosis: Abnormal uterine bleeding (AUB) [N93.9]  Diagnosis Additional Information: No value filed.    Anesthesia Type:   General     Note:    Oropharynx: oropharynx clear of all foreign objects and spontaneously breathing  Level of Consciousness: awake  Oxygen Supplementation: room air    Independent Airway: airway patency satisfactory and stable  Dentition: dentition unchanged  Vital Signs Stable: post-procedure vital signs reviewed and stable  Report to RN Given: handoff report given  Patient transferred to: Phase II    Handoff Report: Identifed the Patient, Identified the Reponsible Provider, Reviewed the pertinent medical history, Discussed the surgical course, Reviewed Intra-OP anesthesia mangement and issues during anesthesia, Set expectations for post-procedure period and Allowed opportunity for questions and acknowledgement of understanding      Vitals:  Vitals Value Taken Time   /60 02/28/24 1008   Temp     Pulse 68 02/28/24 1008   Resp     SpO2 99 % 02/28/24 1011   Vitals shown include unfiled device data.    Electronically Signed By: PENNY Rosales CRNA  February 28, 2024  10:12 AM

## 2024-02-28 NOTE — DISCHARGE INSTRUCTIONS
Same Day Surgery Discharge Instructions  Special Precautions After Surgery - Adult    It is not unusual to feel lightheaded or faint, up to 24 hours after surgery or while taking pain medication.  If you have these symptoms; sit for a few minutes before standing and have someone assist you when getting up.  You should rest and relax for the next 24 hours and must have someone stay with you for at least 24 hours after your discharge.  DO NOT DRIVE any vehicle or operate mechanical equipment for 24 hours following the end of your surgery.  DO NOT DRIVE while taking narcotic pain medications that have been prescribed by your physician.  If you had a limb operated on, you must be able to use it fully to drive.  DO NOT drink alcoholic beverages for 24 hours following surgery or while taking prescription pain medication.  Drink clear liquids (apple juice, ginger ale, broth, 7-Up, etc.).  Progress to your regular diet as you feel able.  Any questions call your physician and do not make important decisions for 24 hours.    Medications:  Tylenol next dose due at 2:30 pm  Ibuprofen may take next dose at anytime  __________________________________________________________________________________________________________________________________  IMPORTANT NUMBERS:    [facility] Main Number:  [phone], [phone]  Pharmacy:  [phone]  Same Day Surgery:  [phone], Monday - Friday until 8:30 p.m.  Urgent Care:  [phone]  Emergency Room:  [phone]                                                                                   OB Clinic:  [phone] Dr. Segura

## 2024-02-28 NOTE — OP NOTE
Operative Note   Name: Kalian Schreiber  MRN:[id number]  : 1970  Date of Surgery: 2024    Pre-operative Diagnosis: AUB  Post-operative Diagnosis: Same  Procedure(s): hysteroscopy, dilation and curettage with endometrial sampling using Myosure, mirena IUD insertion    Surgeon: Nat Segura MD     Anesthesia: MAC  EBL: 10 mL   Urine Output: see anesthesia  Fluids: see anesthesia  Fluid Deficit: 105 mL, NS    Specimens: endometrial curettings   Complications: None apparent.    Findings: EUA revealed anteverted uterus.  Speculum exam revealed normal appearing external genitalia, vaginal mucosa, cervix. .  Hysteroscopic view showed elongated and tortuous cervix with endometrial cavity notable for acuate shape.   Ostia were seen. No masses noted.    Indications: Kalina Schreiber is a 53 year old  with AUB. Options for management reviewed and she desired IUD. Attempt in clinic x2 for bx and IUD placement unsuccessful. Reviewed options and she desired above.  Risks and benefits of procedure were reviewed with patient, questions were answered appropriately, and consent form was signed with patient.    Procedure: After informed consent was obtained as above, patient was taken to the OR where MAC anesthesia was administered and found to be adequate.  The patient was prepped and draped in sterile fashion in the dorsal lithotomy position. Exam under anesthesia was performed with findings as above.  A medium graves speculum was placed in the vagina. 10 cc of 0.25% bupivicaine anesthetic was injected at 4 O'clock and 8 O'clock to achieve paracervical block.  Anterior lip of the cervix was grasped with the single tooth tenaculum.  Given difficulty passing os finder decision made to hydrodilate using hysteroscope. The hysteroscope was place through external os and gentle traction used to pass through cervix into endometrial cavity. Finding were noted as described above.      Morcellator was advanced  into the hysteroscope device and lining diffusely sampled   All of this material was sent to pathology as above.      No other abnormalities were noted. Mirena IUD was then loaded and placed in standard fashion. Uterus sounded to 10 cm.     All instruments were removed. Sponge and needle counts were correct x2. The patient tolerated the procedure well and awoke easily in the operating room and was taken to the recovery in stable condition.     Nat Segura MD   2/28/2024 10:10 AM

## 2024-03-06 ENCOUNTER — DOCUMENTATION ONLY (OUTPATIENT)
Dept: OTHER | Facility: CLINIC | Age: 54
End: 2024-03-06
Payer: COMMERCIAL

## 2024-03-08 DIAGNOSIS — I47.20 VENTRICULAR TACHYCARDIA (H): ICD-10-CM

## 2024-03-08 DIAGNOSIS — I49.3 PVC (PREMATURE VENTRICULAR CONTRACTION): ICD-10-CM

## 2024-03-08 RX ORDER — METOPROLOL SUCCINATE 50 MG/1
50 TABLET, EXTENDED RELEASE ORAL DAILY
Qty: 90 TABLET | OUTPATIENT
Start: 2024-03-08

## 2024-03-08 RX ORDER — METOPROLOL SUCCINATE 50 MG/1
50 TABLET, EXTENDED RELEASE ORAL DAILY
Qty: 90 TABLET | Refills: 0 | Status: SHIPPED | OUTPATIENT
Start: 2024-03-08 | End: 2024-06-03

## 2024-03-08 NOTE — TELEPHONE ENCOUNTER
[facility] Cardiology Refill Guideline reviewed.  Medication meets criteria for refill. Due for follow up. Appt with Dr Lau on 3/19/24. 90 day fill sent. Robyn Medley RN Cardiology March 8, 2024, 7:46 AM

## 2024-03-19 ENCOUNTER — VIRTUAL VISIT (OUTPATIENT)
Dept: CARDIOLOGY | Facility: CLINIC | Age: 54
End: 2024-03-19
Attending: NURSE PRACTITIONER
Payer: COMMERCIAL

## 2024-03-19 VITALS — WEIGHT: 161 LBS | BODY MASS INDEX: 24.84 KG/M2

## 2024-03-19 DIAGNOSIS — I49.3 PVC (PREMATURE VENTRICULAR CONTRACTION): ICD-10-CM

## 2024-03-19 PROCEDURE — 99214 OFFICE O/P EST MOD 30 MIN: CPT | Mod: 95 | Performed by: INTERNAL MEDICINE

## 2024-03-19 NOTE — LETTER
3/19/2024    Magy Franco, APRN CNP  [address] [facility] [address]    RE: Kalina Schreiber       Dear Colleague,     I had the pleasure of seeing Kalina Schreiber in the [facility].  Kalina is a 53 year old who is being evaluated via a billable video visit.    How would you like to obtain your AVS? MyChart  If the video visit is dropped, the invitation should be resent by: Send to e-mail at: [email or web address]   Or [employer] [phone]  Will anyone else be joining your video visit? No    Video-Visit Details    Type of service:  Video Visit   Originating Location (pt. Location): near Home in [place]    Distant Location (provider location):  On-site  Platform used for Video Visit: Epiphyte    A total of 30 minutes was spent with clinic visit, including chart review, including if available echo and cath images, and ECG, Holter, Event and coordinating care    General:  no apparent distress, normal body habitus, sitting upright.  ENT/Mouth:  membranes moist, no nasal discharge.  Normal head shape, no apparent injury or laceration.  Eyes:  no scleral icterus, normal conjunctivae.  No observed jaundice.  Neck:  no apparent neck swelling.   Chest/Lungs:  No breathing difficulty while speaking.  No audible wheezing.  No cough during conversation.  Cardiovascular:  No obviously elevated jugular venous pressure.  No apparent edema bilaterally in LE.   Abdomen:  no obvious abdominal distention.   Extremities:  no apparent cyanosis.  Skin:  no xanthelasma.  No facial lacerations.  Neurologic:  Normal arm motion bilateral, no tremors.    Psychiatric:  Alert, calm demeanor    Delightful pt with frequent RVOT pvc in the background of normal cardiac structure and function based on cardiac MRI with burden up to 30% a/w min symptoms if any. Last holter on 9/23 showed pvc burden 9%. Currently, she is tolerating toprol 50 mg daily. 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 I reassured david that PVC can occur in bigeminal pattern but it's reassuring that PVC burden reduced on current Toprol. I did tell her about small risk of pvc induced cardiomyopathy liban if burden >20%. Will repeat zio 3 days in 6 months to spare her the need to come in for holter. Denies change in exercise tolerance. See janis in a year.     Francisco Lau MD       Thank you for allowing me to participate in the care of your patient.      Sincerely,     Francisco Figueroa MD     [facility]  cc:   PENNY Mackenzie CNP  [address] [place] [address]

## 2024-03-19 NOTE — PROGRESS NOTES
David is a 53 year old who is being evaluated via a billable video visit.    How would you like to obtain your AVS? MyChart  If the video visit is dropped, the invitation should be resent by: Send to e-mail at: [email or web address]   Or Cell [phone]  Will anyone else be joining your video visit? No    Video-Visit Details    Type of service:  Video Visit   Originating Location (pt. Location): near Home in [place]    Distant Location (provider location):  On-site  Platform used for Video Visit: [employer]forama    A total of 30 minutes was spent with clinic visit, including chart review, including if available echo and cath images, and ECG, Holter, Event and coordinating care    General:  no apparent distress, normal body habitus, sitting upright.  ENT/Mouth:  membranes moist, no nasal discharge.  Normal head shape, no apparent injury or laceration.  Eyes:  no scleral icterus, normal conjunctivae.  No observed jaundice.  Neck:  no apparent neck swelling.   Chest/Lungs:  No breathing difficulty while speaking.  No audible wheezing.  No cough during conversation.  Cardiovascular:  No obviously elevated jugular venous pressure.  No apparent edema bilaterally in LE.   Abdomen:  no obvious abdominal distention.   Extremities:  no apparent cyanosis.  Skin:  no xanthelasma.  No facial lacerations.  Neurologic:  Normal arm motion bilateral, no tremors.    Psychiatric:  Alert, calm demeanor    Delightful pt with frequent RVOT pvc in the background of normal cardiac structure and function based on cardiac MRI with burden up to 30% a/w min symptoms if any. Last holter on 9/23 showed pvc burden 9%. Currently, she is tolerating toprol 50 mg daily. 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 I reassured david that PVC can occur in bigeminal pattern but it's reassuring that PVC burden reduced on current Toprol. I did tell her  about small risk of pvc induced cardiomyopathy liban if burden >20%. Will repeat zio 3 days in 6 months to spare her the need to come in for holter. Denies change in exercise tolerance. See janis in a year.     Francisco Lau MD

## 2024-04-11 ENCOUNTER — OFFICE VISIT (OUTPATIENT)
Dept: DERMATOLOGY | Facility: CLINIC | Age: 54
End: 2024-04-11
Payer: COMMERCIAL

## 2024-04-11 ENCOUNTER — LAB (OUTPATIENT)
Dept: LAB | Facility: CLINIC | Age: 54
End: 2024-04-11
Payer: COMMERCIAL

## 2024-04-11 DIAGNOSIS — Z85.828 HISTORY OF BASAL CELL CARCINOMA: ICD-10-CM

## 2024-04-11 DIAGNOSIS — D18.01 CHERRY ANGIOMA: ICD-10-CM

## 2024-04-11 DIAGNOSIS — L81.4 LENTIGO: ICD-10-CM

## 2024-04-11 DIAGNOSIS — D22.9 MULTIPLE BENIGN NEVI: ICD-10-CM

## 2024-04-11 DIAGNOSIS — N93.9 ABNORMAL UTERINE BLEEDING (AUB): ICD-10-CM

## 2024-04-11 DIAGNOSIS — L82.1 SEBORRHEIC KERATOSIS: ICD-10-CM

## 2024-04-11 DIAGNOSIS — L66.12 FRONTAL FIBROSING ALOPECIA: Primary | ICD-10-CM

## 2024-04-11 DIAGNOSIS — L82.0 INFLAMED SEBORRHEIC KERATOSIS: ICD-10-CM

## 2024-04-11 LAB
FSH SERPL IRP2-ACNC: 10.7 MIU/ML
HGB BLD-MCNC: 14 G/DL (ref 11.7–15.7)
T4 FREE SERPL-MCNC: 0.94 NG/DL (ref 0.9–1.7)
TSH SERPL DL<=0.005 MIU/L-ACNC: 5.47 UIU/ML (ref 0.3–4.2)

## 2024-04-11 PROCEDURE — 85018 HEMOGLOBIN: CPT

## 2024-04-11 PROCEDURE — 99213 OFFICE O/P EST LOW 20 MIN: CPT | Mod: 25 | Performed by: PHYSICIAN ASSISTANT

## 2024-04-11 PROCEDURE — 84443 ASSAY THYROID STIM HORMONE: CPT

## 2024-04-11 PROCEDURE — 83001 ASSAY OF GONADOTROPIN (FSH): CPT

## 2024-04-11 PROCEDURE — 36415 COLL VENOUS BLD VENIPUNCTURE: CPT

## 2024-04-11 PROCEDURE — 84439 ASSAY OF FREE THYROXINE: CPT

## 2024-04-11 PROCEDURE — 17110 DESTRUCTION B9 LES UP TO 14: CPT | Performed by: PHYSICIAN ASSISTANT

## 2024-04-11 RX ORDER — FLUOCINONIDE TOPICAL SOLUTION USP, 0.05% 0.5 MG/ML
SOLUTION TOPICAL
Qty: 60 ML | Refills: 9 | Status: SHIPPED | OUTPATIENT
Start: 2024-04-11

## 2024-04-11 NOTE — LETTER
4/11/2024         RE: Kalina Schreiber  [address] [facility] [address]        Dear Colleague,    Thank you for referring your patient, Kalina Schreiber, to the [facility]. Please see a copy of my visit note below.    Kalina Schreiber is an extremely pleasant 52year old year old female patient here today for skin check. Scaly area on left cheek, present for months.  No painful or bleeding. She notes FFA, ok will get inflamed occasionally. She uses topical steroids as needed.  Patient has no other skin complaints today.  Remainder of the HPI, Meds, PMH, Allergies, FH, and SH was reviewed in chart.    Pertinent Hx:   History of BCC  Past Medical History:   Diagnosis Date     Allergic rhinitis due to other allergen      Basal cell carcinoma      Solitary cyst of breast      Twin pregnancy, delivered        Past Surgical History:   Procedure Laterality Date     ABDOMEN SURGERY  6/1995 12/2005    C-sections     BREAST SURGERY  10/2002    Augmentation     COLONOSCOPY  02/23/2012    Procedure:COLONOSCOPY; Colonoscopy  ; Surgeon:BOB TY; Location:[facility]     COLONOSCOPY N/A 05/06/2016    Procedure: COLONOSCOPY;  Surgeon: Markus Grossman MD;  Location: [facility]     COSMETIC SURGERY      Breast augmentation     DILATION AND CURETTAGE, OPERATIVE HYSTEROSCOPY, COMBINED N/A 2/28/2024    Procedure: HYSTEROSCOPY, WITH DILATION AND CURETTAGE OF UTERUS;  Surgeon: Nat Segura MD;  Location: [facility]     ESOPHAGOSCOPY, GASTROSCOPY, DUODENOSCOPY (EGD), COMBINED N/A 05/06/2016    Procedure: COMBINED ESOPHAGOSCOPY, GASTROSCOPY, DUODENOSCOPY (EGD);  Surgeon: Markus Grossman MD;  Location: [facility]     INSERT INTRAUTERINE DEVICE N/A 2/28/2024    Procedure: MIRENA INTRAUTERINE DEVICE INSERTION;  Surgeon: Nat Segura MD;  Location: [facility]     LASER SURGERY OF EYE  08/21/2008    Both eyes     MAMMOPLASTY AUGMENTATION       SURGICAL HISTORY OF -       T&A      SURGICAL HISTORY OF -       Csection     SURGICAL HISTORY OF -       breast augmentation        Family History   Problem Relation Age of Onset     Alcohol/Drug Mother      Cancer Mother         skin, COD: lung, liver, bone cancer     Melanoma Mother      Diabetes Mother         Post transplant onset     Other Cancer Mother         Lung, liver, bone     Alcohol/Drug Father      Hypertension Father      Cancer Maternal Grandmother         lung/liver     Asthma Maternal Grandmother      Other Cancer Maternal Grandmother         Lung, liver, bone     Cancer Maternal Grandfather         skin     Cancer - colorectal Maternal Grandfather      Melanoma Maternal Grandfather      Colon Cancer Maternal Grandfather      Coronary Artery Disease Maternal Grandfather      Cerebrovascular Disease No family hx of      Breast Cancer No family hx of      Prostate Cancer No family hx of        Social History     Socioeconomic History     Marital status:      Spouse name: Not on file     Number of children: Not on file     Years of education: Not on file     Highest education level: Not on file   Occupational History     Employer: SELF   Tobacco Use     Smoking status: Never     Smokeless tobacco: Never   Vaping Use     Vaping status: Never Used   Substance and Sexual Activity     Alcohol use: Yes     Comment: 0-2 drinks per day     Drug use: No     Sexual activity: Yes     Partners: Male     Birth control/protection: Male Surgical     Comment: vas   Other Topics Concern     Parent/sibling w/ CABG, MI or angioplasty before 65F 55M? 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      Surrogate for twins     Social Determinants of Health     Financial Resource Strain: Low Risk  (11/8/2023)    Financial Resource Strain      Within the past 12 months, have you or your family members you live with been unable to get utilities (heat, electricity) when it was really needed?: No   Food Insecurity: Low  Risk  (11/8/2023)    Food Insecurity      Within the past 12 months, did you worry that your food would run out before you got money to buy more?: No      Within the past 12 months, did the food you bought just not last and you didn t have money to get more?: No   Transportation Needs: Low Risk  (11/8/2023)    Transportation Needs      Within the past 12 months, has lack of transportation kept you from medical appointments, getting your medicines, non-medical meetings or appointments, work, or from getting things that you need?: No   Physical Activity: Not on file   Stress: Not on file   Social Connections: Not on file   Interpersonal Safety: Low Risk  (11/9/2023)    Interpersonal Safety      Do you feel physically and emotionally safe where you currently live?: Yes      Within the past 12 months, have you been hit, slapped, kicked or otherwise physically hurt by someone?: No      Within the past 12 months, have you been humiliated or emotionally abused in other ways by your partner or ex-partner?: No   Housing Stability: Low Risk  (11/8/2023)    Housing Stability      Do you have housing? : Yes      Are you worried about losing your housing?: No       Outpatient Encounter Medications as of 4/11/2024   Medication Sig Dispense Refill     fluocinonide (LIDEX) 0.05 % external solution Apply twice daily as needed to frontal scalp. 60 mL 9     ferrous sulfate (IRON) 325 (65 FE) MG tablet Take 1 tablet (325 mg) by mouth 2 times daily 180 tablet 3     fexofenadine (ALLEGRA) 180 MG tablet Take 1 tablet (180 mg) by mouth daily 30 tablet 0     fluticasone (FLONASE) 50 MCG/ACT nasal spray Spray 1 spray into both nostrils daily       ibuprofen (ADVIL/MOTRIN) 200 MG capsule Take 200 mg by mouth every 4 hours as needed       metoprolol succinate ER (TOPROL XL) 50 MG 24 hr tablet Take 1 tablet (50 mg) by mouth daily 90 tablet 0     Oxymetazoline HCl (NASAL SPRAY NA) Nasoquort 1 spray in each nostril daily when needed        SUMAtriptan (IMITREX) 25 MG tablet Take 1-2 tablets (25-50 mg) by mouth at onset of headache for migraine May repeat dose in 2 hours.  Do not exceed 200 mg in 24 hours 18 tablet 11     UNKNOWN TO PATIENT Allergy Eye Drops, 1 drop in each eye daily PRN       [DISCONTINUED] fluocinonide (LIDEX) 0.05 % external solution Apply twice daily as needed to frontal scalp. 60 mL 9     No facility-administered encounter medications on file as of 4/11/2024.             O:   NAD, WDWN, Alert & Oriented, Mood & Affect wnl, Vitals stable   Here today alone   LMP 02/18/2024    General appearance normal   Vitals stable   Alert, oriented and in no acute distress        Scarring hair loss on frontal scalp, stable   Stuck on papules and brown macules on trunk and ext   Red papules on trunk  Brown papules and macules with regular pigment network and borders on torso and extremities   The remainder of skin exam is normal      Eyes: Conjunctivae/lids:Normal     ENT: Lips: normal    MSK:Normal    Cardiovascular: peripheral edema none    Pulm: Breathing Normal    Neuro/Psych: Orientation:Alert and Orientedx3 ; Mood/Affect:normal   A/P:  1. Frontal fibrosing alopecia   Doing well, continue lidex solution three times weekly.  2. Inflamed seborrheic keratosis on right breast and left cheek x 2  LN2:  Treated with LN2 for 5s for 1-2 cycles. Warned risks of blistering, pain, pigment change, scarring, and incomplete resolution.  Advised patient to return if lesions do not completely resolve.  Wound care sheet given.  3. Seborrheic keratosis, lentigo, angioma, benign nevi, history of BCC  BENIGN LESIONS DISCUSSED WITH PATIENT:  I discussed the specifics of tumor, prognosis, and genetics of benign lesions.  I explained that treatment of these lesions would be purely cosmetic and not medically neccessary.  I discussed with patient different removal options including excision, cautery and /or laser.      Nature and genetics of benign skin lesions  dicussed with patient.  Signs and Symptoms of skin cancer discussed with patient.  ABCDEs of melanoma reviewed with patient.  Patient encouraged to perform monthly skin exams.  UV precautions reviewed with patient.  Risks of non-melanoma skin cancer discussed with patient   Return to clinic in one year or sooner if needed.     Again, thank you for allowing me to participate in the care of your patient.        Sincerely,        Talisha Aguilar PA-C

## 2024-04-11 NOTE — PATIENT INSTRUCTIONS
Hair loss looks stable, continue Lidex. Follow up in 1 year for a skin check. 6 months for hair loss if you feel that it is increasing.    Use a heavy moisturizing cream ( vs. lighter lotion) on neck to help with irritation    WOUND CARE INSTRUCTIONS   FOR CRYOSURGERY   This area treated with liquid nitrogen should form a blister (areas treated may or may not blister-skin may just turn dark and slough off). You do not need to bandage the area unless a blister forms and breaks (which may be a few days). When the blister breaks, begin daily dressing changes as follows:   1) Clean and dry the area with tap water using clean Q-tip or sterile gauze pad.   2) Apply Polysporin ointment or Bacitracin ointment over entire wound. Do NOT use Neosporin ointment.   3) Cover the wound with a band-aid or sterile non-stick gauze pad and micropore paper tape.   REPEAT THESE INSTRUCTIONS AT LEAST ONCE A DAY UNTIL THE WOUND HAS COMPLETELY HEALED.   It is an old wives tale that a wound heals better when it is exposed to air and allowed to dry out. The wound will heal faster with a better cosmetic result if it is kept moist with ointment and covered with a bandage.   *Do not let the wound dry out.   Supplies Needed:   *Cotton tipped applicators (Q-tips)   *Polysporin ointment or Bacitracin ointment (NOT NEOSPORIN)   *Band-aids, or non stick gauze pads and micropore paper tape   PATIENT INFORMATION   During the healing process you will notice a number of changes. All wounds develop a small halo of redness surrounding the wound. This means healing is occurring. Severe itching with extensive redness usually indicates sensitivity to the ointment or bandage tape used to dress the wound. You should call our office if this develops.   Swelling and/or discoloration around your surgical site is common, particularly when performed around the eye.   All wounds normally drain. The larger the wound the more drainage there will be. After 7-10 days,  you will notice the wound beginning to shrink and new skin will begin to grow. The wound is healed when you can see skin has formed over the entire area. A healed wound has a healthy, shiny look to the surface and is red to dark pink in color to normalize. Wounds may take approximately 4-6 weeks to heal. Larger wounds may take 6-8 weeks. After the wound is healed you may discontinue dressing changes.   You may experience a sensation of tightness as your wound heals. This is normal and will gradually subside.   Your healed wound may be sensitive to temperature changes. This sensitivity improves with time, but if you re having a lot of discomfort, try to avoid temperature extremes.   Patients frequently experience itching after their wound appears to have healed because of the continue healing under the skin. Plain Vaseline will help relieve the itching.      Patient Education       Proper skin care from [facility]:    -Eliminate harsh soaps as they strip the natural oils from the skin, often resulting in dry itchy skin ( i.e. Dial, Zest, [place] Spring)  -Use mild soaps such as Cetaphil or Dove Sensitive Skin in the shower. You do not need to use soap on arms, legs, and trunk every time you shower unless visibly soiled.   -Avoid hot or cold showers.  -After showering, lightly dry off and apply moisturizing within 2-3 minutes. This will help trap moisture in the skin.   -Aggressive use of a moisturizer at least 1-2 times a day to the entire body (including -Vanicream, Cetaphil, Aquaphor or Cerave) and moisturize hands after every washing.  -We recommend using moisturizers that come in a tub that needs to be scooped out, not a pump. This has more of an oil base. It will hold moisture in your skin much better than a water base moisturizer. The above recommended are non-pore clogging.      Wear a sunscreen with at least SPF 30 on your face, ears, neck and V of the chest daily. Wear sunscreen on other areas of the  body if those areas are exposed to the sun throughout the day. Sunscreens can contain physical and/or chemical blockers. Physical blockers are less likely to clog pores, these include zinc oxide and titanium dioxide. Reapply every two hour and after swimming.     Sunscreen examples: https://www.ewg.org/sunscreen/    UV radiation  UVA radiation remains constant throughout the day and throughout the year. It is a longer wavelength than UVB and therefore penetrates deeper into the skin leading to immediate and delayed tanning, photoaging, and skin cancer. 70-80% of UVA and UVB radiation occurs between the hours of 10am-2pm.  UVB radiation  UVB radiation causes the most harmful effects and is more significant during the summer months. However, snow and ice can reflect UVB radiation leading to skin damage during the winter months as well. UVB radiation is responsible for tanning, burning, inflammation, delayed erythema (pinkness), pigmentation (brown spots), and skin cancer.     I recommend self monthly full body exams and yearly full body exams with a dermatology provider. If you develop a new or changing lesion please follow up for examination. Most skin cancers are pink and scaly or pink and pearly. However, we do see blue/brown/black skin cancers.  Consider the ABCDEs of melanoma when giving yourself your monthly full body exam ( don't forget the groin, buttocks, feet, toes, etc). A-asymmetry, B-borders, C-color, D-diameter, E-elevation or evolving. If you see any of these changes please follow up in clinic. If you cannot see your back I recommend purchasing a hand held mirror to use with a larger wall mirror.       Checking for Skin Cancer  You can find cancer early by checking your skin each month. There are 3 kinds of skin cancer. They are melanoma, basal cell carcinoma, and squamous cell carcinoma. Doing monthly skin checks is the best way to find new marks or skin changes. Follow the instructions below for  checking your skin.   The ABCDEs of checking moles for melanoma   Check your moles or growths for signs of melanoma using ABCDE:   Asymmetry: the sides of the mole or growth don t match  Border: the edges are ragged, notched, or blurred  Color: the color within the mole or growth varies  Diameter: the mole or growth is larger than 6 mm (size of a pencil eraser)  Evolving: the size, shape, or color of the mole or growth is changing (evolving is not shown in the images below)    Checking for other types of skin cancer  Basal cell carcinoma or squamous cell carcinoma have symptoms such as:     A spot or mole that looks different from all other marks on your skin  Changes in how an area feels, such as itching, tenderness, or pain  Changes in the skin's surface, such as oozing, bleeding, or scaliness  A sore that does not heal  New swelling or redness beyond the border of a mole    Who s at risk?  Anyone can get skin cancer. But you are at greater risk if you have:   Fair skin, light-colored hair, or light-colored eyes  Many moles or abnormal moles on your skin  A history of sunburns from sunlight or tanning beds  A family history of skin cancer  A history of exposure to radiation or chemicals  A weakened immune system  If you have had skin cancer in the past, you are at risk for recurring skin cancer.   How to check your skin  Do your monthly skin checkups in front of a full-length mirror. Check all parts of your body, including your:   Head (ears, face, neck, and scalp)  Torso (front, back, and sides)  Arms (tops, undersides, upper, and lower armpits)  Hands (palms, backs, and fingers, including under the nails)  Buttocks and genitals  Legs (front, back, and sides)  Feet (tops, soles, toes, including under the nails, and between toes)  If you have a lot of moles, take digital photos of them each month. Make sure to take photos both up close and from a distance. These can help you see if any moles change over time.    Most skin changes are not cancer. But if you see any changes in your skin, call your doctor right away. Only he or she can diagnose a problem. If you have skin cancer, seeing your doctor can be the first step toward getting the treatment that could save your life.   Nirmala last reviewed this educational content on 4/1/2019 2000-2020 [employer] [address] [place] [address], [place] [address]. All rights reserved. This information is not intended as a substitute for professional medical care. Always follow your healthcare professional's instructions.       When should I call my doctor?  If you are worsening or not improving, please, contact us or seek urgent care as noted below.     Who should I call with questions (adults)?  [facility] (adult and pediatric): [phone]  [facility] (adult): [phone]  [facility] ([facility] and [place]) [phone]  For urgent needs outside of business hours call the [facility] at [phone] and ask for the dermatology resident on call to be paged  If this is a medical emergency and you are unable to reach an ER, Call [phone]      If you need a prescription refill, please contact your pharmacy. Refills are approved or denied by our Physicians during normal business hours, Monday through Fridays  Per office policy, refills will not be granted if you have not been seen within the past year (or sooner depending on your child's condition)

## 2024-04-11 NOTE — PROGRESS NOTES
Kalina Schreiber is an extremely pleasant 52year old year old female patient here today for skin check. Scaly area on left cheek, present for months.  No painful or bleeding. She notes FFA, ok will get inflamed occasionally. She uses topical steroids as needed.  Patient has no other skin complaints today.  Remainder of the HPI, Meds, PMH, Allergies, FH, and SH was reviewed in chart.    Pertinent Hx:   History of BCC  Past Medical History:   Diagnosis Date    Allergic rhinitis due to other allergen     Basal cell carcinoma     Solitary cyst of breast     Twin pregnancy, delivered        Past Surgical History:   Procedure Laterality Date    ABDOMEN SURGERY  6/1995 12/2005    C-sections    BREAST SURGERY  10/2002    Augmentation    COLONOSCOPY  02/23/2012    Procedure:COLONOSCOPY; Colonoscopy  ; Surgeon:BOB TY; Location:[facility]    COLONOSCOPY N/A 05/06/2016    Procedure: COLONOSCOPY;  Surgeon: Markus Grossman MD;  Location: [facility]    COSMETIC SURGERY      Breast augmentation    DILATION AND CURETTAGE, OPERATIVE HYSTEROSCOPY, COMBINED N/A 2/28/2024    Procedure: HYSTEROSCOPY, WITH DILATION AND CURETTAGE OF UTERUS;  Surgeon: Nat Segura MD;  Location: [facility]    ESOPHAGOSCOPY, GASTROSCOPY, DUODENOSCOPY (EGD), COMBINED N/A 05/06/2016    Procedure: COMBINED ESOPHAGOSCOPY, GASTROSCOPY, DUODENOSCOPY (EGD);  Surgeon: Markus Grossman MD;  Location: [facility]    INSERT INTRAUTERINE DEVICE N/A 2/28/2024    Procedure: MIRENA INTRAUTERINE DEVICE INSERTION;  Surgeon: Nat Segura MD;  Location: [facility]    LASER SURGERY OF EYE  08/21/2008    Both eyes    MAMMOPLASTY AUGMENTATION      SURGICAL HISTORY OF -       T&A    SURGICAL HISTORY OF -       Csection    SURGICAL HISTORY OF -       breast augmentation        Family History   Problem Relation Age of Onset    Alcohol/Drug Mother     Cancer Mother         skin, COD: lung, liver, bone cancer    Melanoma Mother     Diabetes Mother          Post transplant onset    Other Cancer Mother         Lung, liver, bone    Alcohol/Drug Father     Hypertension Father     Cancer Maternal Grandmother         lung/liver    Asthma Maternal Grandmother     Other Cancer Maternal Grandmother         Lung, liver, bone    Cancer Maternal Grandfather         skin    Cancer - colorectal Maternal Grandfather     Melanoma Maternal Grandfather     Colon Cancer Maternal Grandfather     Coronary Artery Disease Maternal Grandfather     Cerebrovascular Disease No family hx of     Breast Cancer No family hx of     Prostate Cancer No family hx of        Social History     Socioeconomic History    Marital status:      Spouse name: Not on file    Number of children: Not on file    Years of education: Not on file    Highest education level: Not on file   Occupational History     Employer: SELF   Tobacco Use    Smoking status: Never    Smokeless tobacco: Never   Vaping Use    Vaping status: Never Used   Substance and Sexual Activity    Alcohol use: Yes     Comment: 0-2 drinks per day    Drug use: No    Sexual activity: Yes     Partners: Male     Birth control/protection: Male Surgical     Comment: vas   Other Topics Concern    Parent/sibling w/ CABG, MI or angioplasty before 65F 55M? 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      Surrogate for twins     Social Determinants of Health     Financial Resource Strain: Low Risk  (11/8/2023)    Financial Resource Strain     Within the past 12 months, have you or your family members you live with been unable to get utilities (heat, electricity) when it was really needed?: No   Food Insecurity: Low Risk  (11/8/2023)    Food Insecurity     Within the past 12 months, did you worry that your food would run out before you got money to buy more?: No     Within the past 12 months, did the food you bought just not last and you didn t have money to get more?: No   Transportation Needs: Low Risk   (11/8/2023)    Transportation Needs     Within the past 12 months, has lack of transportation kept you from medical appointments, getting your medicines, non-medical meetings or appointments, work, or from getting things that you need?: No   Physical Activity: Not on file   Stress: Not on file   Social Connections: Not on file   Interpersonal Safety: Low Risk  (11/9/2023)    Interpersonal Safety     Do you feel physically and emotionally safe where you currently live?: Yes     Within the past 12 months, have you been hit, slapped, kicked or otherwise physically hurt by someone?: No     Within the past 12 months, have you been humiliated or emotionally abused in other ways by your partner or ex-partner?: No   Housing Stability: Low Risk  (11/8/2023)    Housing Stability     Do you have housing? : Yes     Are you worried about losing your housing?: No       Outpatient Encounter Medications as of 4/11/2024   Medication Sig Dispense Refill    fluocinonide (LIDEX) 0.05 % external solution Apply twice daily as needed to frontal scalp. 60 mL 9    ferrous sulfate (IRON) 325 (65 FE) MG tablet Take 1 tablet (325 mg) by mouth 2 times daily 180 tablet 3    fexofenadine (ALLEGRA) 180 MG tablet Take 1 tablet (180 mg) by mouth daily 30 tablet 0    fluticasone (FLONASE) 50 MCG/ACT nasal spray Spray 1 spray into both nostrils daily      ibuprofen (ADVIL/MOTRIN) 200 MG capsule Take 200 mg by mouth every 4 hours as needed      metoprolol succinate ER (TOPROL XL) 50 MG 24 hr tablet Take 1 tablet (50 mg) by mouth daily 90 tablet 0    Oxymetazoline HCl (NASAL SPRAY NA) Nasoquort 1 spray in each nostril daily when needed      SUMAtriptan (IMITREX) 25 MG tablet Take 1-2 tablets (25-50 mg) by mouth at onset of headache for migraine May repeat dose in 2 hours.  Do not exceed 200 mg in 24 hours 18 tablet 11    UNKNOWN TO PATIENT Allergy Eye Drops, 1 drop in each eye daily PRN      [DISCONTINUED] fluocinonide (LIDEX) 0.05 % external solution  Apply twice daily as needed to frontal scalp. 60 mL 9     No facility-administered encounter medications on file as of 4/11/2024.             O:   NAD, WDWN, Alert & Oriented, Mood & Affect wnl, Vitals stable   Here today alone   LMP 02/18/2024    General appearance normal   Vitals stable   Alert, oriented and in no acute distress        Scarring hair loss on frontal scalp, stable   Stuck on papules and brown macules on trunk and ext   Red papules on trunk  Brown papules and macules with regular pigment network and borders on torso and extremities   The remainder of skin exam is normal      Eyes: Conjunctivae/lids:Normal     ENT: Lips: normal    MSK:Normal    Cardiovascular: peripheral edema none    Pulm: Breathing Normal    Neuro/Psych: Orientation:Alert and Orientedx3 ; Mood/Affect:normal   A/P:  1. Frontal fibrosing alopecia   Doing well, continue lidex solution three times weekly.  2. Inflamed seborrheic keratosis on right breast and left cheek x 2  LN2:  Treated with LN2 for 5s for 1-2 cycles. Warned risks of blistering, pain, pigment change, scarring, and incomplete resolution.  Advised patient to return if lesions do not completely resolve.  Wound care sheet given.  3. Seborrheic keratosis, lentigo, angioma, benign nevi, history of BCC  BENIGN LESIONS DISCUSSED WITH PATIENT:  I discussed the specifics of tumor, prognosis, and genetics of benign lesions.  I explained that treatment of these lesions would be purely cosmetic and not medically neccessary.  I discussed with patient different removal options including excision, cautery and /or laser.      Nature and genetics of benign skin lesions dicussed with patient.  Signs and Symptoms of skin cancer discussed with patient.  ABCDEs of melanoma reviewed with patient.  Patient encouraged to perform monthly skin exams.  UV precautions reviewed with patient.  Risks of non-melanoma skin cancer discussed with patient   Return to clinic in one year or sooner if  needed.

## 2024-06-03 DIAGNOSIS — I49.3 PVC (PREMATURE VENTRICULAR CONTRACTION): ICD-10-CM

## 2024-06-03 DIAGNOSIS — I47.20 VENTRICULAR TACHYCARDIA (H): ICD-10-CM

## 2024-06-03 RX ORDER — METOPROLOL SUCCINATE 50 MG/1
50 TABLET, EXTENDED RELEASE ORAL DAILY
Qty: 90 TABLET | Refills: 3 | Status: SHIPPED | OUTPATIENT
Start: 2024-06-03

## 2024-06-03 NOTE — TELEPHONE ENCOUNTER
[facility] Cardiology Refill Guideline reviewed.  Medication meets criteria for refill. Robyn Medley RN Cardiology Rossi 3, 2024, 1:52 PM

## 2024-06-03 NOTE — TELEPHONE ENCOUNTER
Last Office Visit: 09/14/23 MINNA Cochran  Next Office Visit: TBD  Last Fill Date: 03/08/24  Jeannette Ortega MA Cardiology   6/3/2024 1:23 PM

## 2024-06-10 RX ORDER — FLUOCINONIDE TOPICAL SOLUTION USP, 0.05% 0.5 MG/ML
SOLUTION TOPICAL
Qty: 60 ML | Refills: 9 | Status: CANCELLED | OUTPATIENT
Start: 2024-06-10

## 2024-06-10 NOTE — TELEPHONE ENCOUNTER
Requested Prescriptions   Pending Prescriptions Disp Refills    fluocinonide (LIDEX) 0.05 % external solution 60 mL 9     Sig: Apply twice daily as needed to frontal scalp.       There is no refill protocol information for this order        Last office visit: 4/11/2024 ; last virtual visit: Visit date not found with prescribing provider:  Talisha Cleveland   Future Office Visit:      Thank you,  Destinee Boucher  [facility]  [address] [place] [address]  Priority line: [phone] (please do not share number with patient)   Employed by [facility]         Returned pt phone from [facility] left at the pain line    Phone picked up and someone hung up

## 2024-06-11 NOTE — TELEPHONE ENCOUNTER
Pt called in old rx number per pharmacy. They have the 4-11-24 order on file.  Gisele Sequeira RN

## 2024-09-11 ENCOUNTER — OFFICE VISIT (OUTPATIENT)
Dept: FAMILY MEDICINE | Facility: CLINIC | Age: 54
End: 2024-09-11
Payer: COMMERCIAL

## 2024-09-11 VITALS
DIASTOLIC BLOOD PRESSURE: 58 MMHG | BODY MASS INDEX: 24.86 KG/M2 | HEART RATE: 40 BPM | RESPIRATION RATE: 12 BRPM | HEIGHT: 68 IN | TEMPERATURE: 98.5 F | WEIGHT: 164 LBS | OXYGEN SATURATION: 98 % | SYSTOLIC BLOOD PRESSURE: 110 MMHG

## 2024-09-11 DIAGNOSIS — H91.92 HEARING LOSS OF LEFT EAR, UNSPECIFIED HEARING LOSS TYPE: Primary | ICD-10-CM

## 2024-09-11 DIAGNOSIS — L30.9 DERMATITIS: ICD-10-CM

## 2024-09-11 PROCEDURE — 99213 OFFICE O/P EST LOW 20 MIN: CPT | Performed by: NURSE PRACTITIONER

## 2024-09-11 ASSESSMENT — PAIN SCALES - GENERAL: PAINLEVEL: NO PAIN (0)

## 2024-09-11 NOTE — PROGRESS NOTES
"  Assessment & Plan     Hearing loss of left ear, unspecified hearing loss type  Ear normal on exam.  Recommend hearing eval.  - Adult Audiology  Referral; Future    Dermatitis  Recommend over-the-counter Cortisone BID for up to 2 weeks.  If no improvement, follow up with derm.      The risks, benefits and treatment options of prescribed medications or other treatments have been discussed with the patient. The patient verbalized their understanding and should call or follow up if no improvement or if they develop further problems.  Magy Franco, CNP                Subjective   Kalina is a 53 year old, presenting for the following health issues:  Ear Problem and Derm Problem (Skin on chin)        9/11/2024     1:02 PM   Additional Questions   Roomed by Donna RAMEY CMA   Accompanied by self         9/11/2024     1:02 PM   Patient Reported Additional Medications   Patient reports taking the following new medications none     HPI       Concern - left ear  Onset:  3-4 months  Description: can't hear well in the left ear  Maybe has a foreign body (bug) in it  May be ear wax  No pain  No fluid sensation.  Intensity: mild  Progression of Symptoms:  same  Accompanying Signs & Symptoms:   Previous history of similar problem:   Precipitating factors:        Worsened by: bug?  Or ear wax?  Alleviating factors:        Improved by:   Therapies tried and outcome:       Also check skin on chin  Feels rough  For 2 weeks                Review of Systems  Constitutional, HEENT, cardiovascular, pulmonary, gi and gu systems are negative, except as otherwise noted.      Objective    /58 (BP Location: Right arm, Patient Position: Sitting, Cuff Size: Adult Regular)   Pulse (!) 40   Temp 98.5  F (36.9  C) (Tympanic)   Resp 12   Ht 1.715 m (5' 7.5\")   Wt 74.4 kg (164 lb)   SpO2 98%   BMI 25.31 kg/m    Body mass index is 25.31 kg/m .  Physical Exam   GENERAL: alert and no distress  HENT: ear canals and TM's normal, " nose and mouth without ulcers or lesions  SKIN: small erythematous patch on the right side of the chin.            Signed Electronically by: PENNY Nathan CNP

## 2024-09-16 ENCOUNTER — OFFICE VISIT (OUTPATIENT)
Dept: OTOLARYNGOLOGY | Facility: CLINIC | Age: 54
End: 2024-09-16
Payer: COMMERCIAL

## 2024-09-16 ENCOUNTER — OFFICE VISIT (OUTPATIENT)
Dept: AUDIOLOGY | Facility: CLINIC | Age: 54
End: 2024-09-16
Payer: COMMERCIAL

## 2024-09-16 VITALS
BODY MASS INDEX: 24.25 KG/M2 | SYSTOLIC BLOOD PRESSURE: 106 MMHG | OXYGEN SATURATION: 98 % | HEART RATE: 61 BPM | WEIGHT: 160 LBS | HEIGHT: 68 IN | DIASTOLIC BLOOD PRESSURE: 64 MMHG

## 2024-09-16 DIAGNOSIS — H90.5 SENSORY HEARING LOSS, UNILATERAL: Primary | ICD-10-CM

## 2024-09-16 DIAGNOSIS — H90.42 SENSORINEURAL HEARING LOSS (SNHL) OF LEFT EAR WITH UNRESTRICTED HEARING OF RIGHT EAR: Primary | ICD-10-CM

## 2024-09-16 DIAGNOSIS — H91.92 HEARING LOSS OF LEFT EAR, UNSPECIFIED HEARING LOSS TYPE: ICD-10-CM

## 2024-09-16 PROCEDURE — 99203 OFFICE O/P NEW LOW 30 MIN: CPT | Performed by: OTOLARYNGOLOGY

## 2024-09-16 PROCEDURE — 92550 TYMPANOMETRY & REFLEX THRESH: CPT | Performed by: AUDIOLOGIST

## 2024-09-16 PROCEDURE — 92557 COMPREHENSIVE HEARING TEST: CPT | Performed by: AUDIOLOGIST

## 2024-09-16 NOTE — LETTER
9/16/2024      Kalina Schreiber  [id number] [facility] [id number]      Dear Colleague,    Thank you for referring your patient, Kalina Schreiber, to the [facility]. Please see a copy of my visit note below.    Kalina Schreiber is a 53 year old female  Chief Complaint: Hearing impairment  History of Present Illness  Location:  Quality:  Severity:  Duration:    Past Medical History -   Patient Active Problem List   Diagnosis     FAMILY HISTORY OF COLONIC POLYPS     CARDIOVASCULAR SCREENING; LDL GOAL LESS THAN 160     Seasonal allergic rhinitis     GERD (gastroesophageal reflux disease)     Migraine with aura     Iron deficiency anemia due to chronic blood loss     Frontal fibrosing alopecia       Current Medications -   Current Outpatient Medications:      fexofenadine (ALLEGRA) 180 MG tablet, Take 1 tablet (180 mg) by mouth daily, Disp: 30 tablet, Rfl: 0     fluocinonide (LIDEX) 0.05 % external solution, Apply twice daily as needed to frontal scalp., Disp: 60 mL, Rfl: 9     fluticasone (FLONASE) 50 MCG/ACT nasal spray, Spray 1 spray into both nostrils daily, Disp: , Rfl:      ibuprofen (ADVIL/MOTRIN) 200 MG capsule, Take 200 mg by mouth every 4 hours as needed, Disp: , Rfl:      metoprolol succinate ER (TOPROL XL) 50 MG 24 hr tablet, Take 1 tablet (50 mg) by mouth daily, Disp: 90 tablet, Rfl: 3     SUMAtriptan (IMITREX) 25 MG tablet, Take 1-2 tablets (25-50 mg) by mouth at onset of headache for migraine May repeat dose in 2 hours.  Do not exceed 200 mg in 24 hours, Disp: 18 tablet, Rfl: 11     UNKNOWN TO PATIENT, Allergy Eye Drops, 1 drop in each eye daily PRN, Disp: , Rfl:      ferrous sulfate (IRON) 325 (65 FE) MG tablet, Take 1 tablet (325 mg) by mouth 2 times daily (Patient not taking: Reported on 9/11/2024), Disp: 180 tablet, Rfl: 3    Allergies -   Allergies   Allergen Reactions     Nkda [No Known Drug Allergy]        Social History -   Social History     Socioeconomic History      Marital status:      Spouse name: None     Number of children: None     Years of education: None     Highest education level: None   Occupational History     Employer: SELF   Tobacco Use     Smoking status: Never     Smokeless tobacco: Never   Vaping Use     Vaping status: Never Used   Substance and Sexual Activity     Alcohol use: Yes     Comment: 0-2 drinks per day     Drug use: No     Sexual activity: Yes     Partners: Male     Birth control/protection: Male Surgical     Comment: vas   Other Topics Concern     Parent/sibling w/ CABG, MI or angioplasty before 65F 55M? 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      Surrogate for twins     Social Determinants of Health     Financial Resource Strain: Low Risk  (11/8/2023)    Financial Resource Strain      Within the past 12 months, have you or your family members you live with been unable to get utilities (heat, electricity) when it was really needed?: No   Food Insecurity: Low Risk  (11/8/2023)    Food Insecurity      Within the past 12 months, did you worry that your food would run out before you got money to buy more?: No      Within the past 12 months, did the food you bought just not last and you didn t have money to get more?: No   Transportation Needs: Low Risk  (11/8/2023)    Transportation Needs      Within the past 12 months, has lack of transportation kept you from medical appointments, getting your medicines, non-medical meetings or appointments, work, or from getting things that you need?: No   Interpersonal Safety: Low Risk  (9/11/2024)    Interpersonal Safety      Do you feel physically and emotionally safe where you currently live?: Yes      Within the past 12 months, have you been hit, slapped, kicked or otherwise physically hurt by someone?: No      Within the past 12 months, have you been humiliated or emotionally abused in other ways by your partner or ex-partner?: No   Housing Stability: Low  "Risk  (11/8/2023)    Housing Stability      Do you have housing? : Yes      Are you worried about losing your housing?: No       Family History -   Family History   Problem Relation Age of Onset     Alcohol/Drug Mother      Cancer Mother         skin, COD: lung, liver, bone cancer     Melanoma Mother      Diabetes Mother         Post transplant onset     Other Cancer Mother         Lung, liver, bone     Alcohol/Drug Father      Hypertension Father      Cancer Maternal Grandmother         lung/liver     Asthma Maternal Grandmother      Other Cancer Maternal Grandmother         Lung, liver, bone     Cancer Maternal Grandfather         skin     Cancer - colorectal Maternal Grandfather      Melanoma Maternal Grandfather      Colon Cancer Maternal Grandfather      Coronary Artery Disease Maternal Grandfather      Cerebrovascular Disease No family hx of      Breast Cancer No family hx of      Prostate Cancer No family hx of        Review of Systems:   !.  Weight Loss: No   2. Difficulty Breathing: No   3. Difficulty Swallowing: No   4. Pain: No    Physical Exam  B/P: 106/64, T: Data Unavailable, P: 61, R: Data Unavailable  Vitals: /64   Pulse 61   Ht 1.715 m (5' 7.5\")   Wt 72.6 kg (160 lb)   LMP 08/28/2024 (Approximate)   SpO2 98%   BMI 24.69 kg/m    BMI= Body mass index is 24.69 kg/m .    General  Appearance - Normal  Head/Face/Scalp:    Skin - Normal    Facial Palpation - Normal    Facial Strength - Normal  Ears: Rinne positive bilaterally at 128 Hz    Pinna - Normal    Canal - Normal   Tympanic membrane - Normal  Nose:    External - Normal    Septum - Normal    Turbinates - Normal    Middle meatus - Normal  Oral Cavity:    Lips - Normal    Floor of Mouth - Normal    Gingiva - Normal    Tongue - Normal    Buccal - Normal    Palate - Normal  Nasopharynx:    Oropharynx:    Tonsils - Normal    Tongue base - Normal    Soft palate - Normal    Posterior pharyngeal wall - Normal  Hypopharynx:  Larynx:    Epiglottis " -     Aryepiglottic folds -     Arytenoids -     False vocal cords -     True vocal cords -  Neck Masses - No  Neck lymphatics - no lymphadenopathy  Thyroid - Normal  Salivary glands - Normal    Audiogram - personally reviewed and interpreted, showing very mild SNHL in the left ear and normal tympanograms   RIGHT:  normal hearing thresholds    LEFT:    normal sloping to borderline-normal and mild sensorineural hearing loss     Tympanogram:    RIGHT: hyper eardrum mobility (Type Ad)    LEFT:   hyper eardrum mobility (Type Ad)     Reflexes (reported by stimulus ear):  RIGHT: Ipsilateral is absent at frequencies tested  RIGHT: Contralateral is absent at frequencies tested  LEFT:   Ipsilateral is present at normal levels  LEFT:   Contralateral is present at normal levels        Speech Reception Threshold:    RIGHT: 10 dB HL    LEFT:   15 dB HL  Word Recognition Score:     RIGHT: 100% at 50 dB HL using NU-6 recorded word list.    LEFT:   96% at 55 dB HL using NU-6 recorded word list.     Radiology - not applicable   Reports:   View films:  Procedures - not applicable  Patient Education:     A/P - Kalina Schreiber is a 53 year old female  Medical Decision Making 1. Mild SNHL left ear      Again, thank you for allowing me to participate in the care of your patient.        Sincerely,        Issa Orellana MD

## 2024-09-16 NOTE — PROGRESS NOTES
Kalina Schreiber is a 53 year old female  Chief Complaint: Hearing impairment  History of Present Illness  Location:  Quality:  Severity:  Duration:    Past Medical History -   Patient Active Problem List   Diagnosis    FAMILY HISTORY OF COLONIC POLYPS    CARDIOVASCULAR SCREENING; LDL GOAL LESS THAN 160    Seasonal allergic rhinitis    GERD (gastroesophageal reflux disease)    Migraine with aura    Iron deficiency anemia due to chronic blood loss    Frontal fibrosing alopecia       Current Medications -   Current Outpatient Medications:     fexofenadine (ALLEGRA) 180 MG tablet, Take 1 tablet (180 mg) by mouth daily, Disp: 30 tablet, Rfl: 0    fluocinonide (LIDEX) 0.05 % external solution, Apply twice daily as needed to frontal scalp., Disp: 60 mL, Rfl: 9    fluticasone (FLONASE) 50 MCG/ACT nasal spray, Spray 1 spray into both nostrils daily, Disp: , Rfl:     ibuprofen (ADVIL/MOTRIN) 200 MG capsule, Take 200 mg by mouth every 4 hours as needed, Disp: , Rfl:     metoprolol succinate ER (TOPROL XL) 50 MG 24 hr tablet, Take 1 tablet (50 mg) by mouth daily, Disp: 90 tablet, Rfl: 3    SUMAtriptan (IMITREX) 25 MG tablet, Take 1-2 tablets (25-50 mg) by mouth at onset of headache for migraine May repeat dose in 2 hours.  Do not exceed 200 mg in 24 hours, Disp: 18 tablet, Rfl: 11    UNKNOWN TO PATIENT, Allergy Eye Drops, 1 drop in each eye daily PRN, Disp: , Rfl:     ferrous sulfate (IRON) 325 (65 FE) MG tablet, Take 1 tablet (325 mg) by mouth 2 times daily (Patient not taking: Reported on 9/11/2024), Disp: 180 tablet, Rfl: 3    Allergies -   Allergies   Allergen Reactions    Nkda [No Known Drug Allergy]        Social History -   Social History     Socioeconomic History    Marital status:      Spouse name: None    Number of children: None    Years of education: None    Highest education level: None   Occupational History     Employer: SELF   Tobacco Use    Smoking status: Never    Smokeless tobacco: Never   Vaping Use     Vaping status: Never Used   Substance and Sexual Activity    Alcohol use: Yes     Comment: 0-2 drinks per day    Drug use: No    Sexual activity: Yes     Partners: Male     Birth control/protection: Male Surgical     Comment: vas   Other Topics Concern    Parent/sibling w/ CABG, MI or angioplasty before 65F 55M? 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      Surrogate for twins     Social Determinants of Health     Financial Resource Strain: Low Risk  (11/8/2023)    Financial Resource Strain     Within the past 12 months, have you or your family members you live with been unable to get utilities (heat, electricity) when it was really needed?: No   Food Insecurity: Low Risk  (11/8/2023)    Food Insecurity     Within the past 12 months, did you worry that your food would run out before you got money to buy more?: No     Within the past 12 months, did the food you bought just not last and you didn t have money to get more?: No   Transportation Needs: Low Risk  (11/8/2023)    Transportation Needs     Within the past 12 months, has lack of transportation kept you from medical appointments, getting your medicines, non-medical meetings or appointments, work, or from getting things that you need?: No   Interpersonal Safety: Low Risk  (9/11/2024)    Interpersonal Safety     Do you feel physically and emotionally safe where you currently live?: Yes     Within the past 12 months, have you been hit, slapped, kicked or otherwise physically hurt by someone?: No     Within the past 12 months, have you been humiliated or emotionally abused in other ways by your partner or ex-partner?: No   Housing Stability: Low Risk  (11/8/2023)    Housing Stability     Do you have housing? : Yes     Are you worried about losing your housing?: No       Family History -   Family History   Problem Relation Age of Onset    Alcohol/Drug Mother     Cancer Mother         skin, COD: lung, liver, bone cancer     "Melanoma Mother     Diabetes Mother         Post transplant onset    Other Cancer Mother         Lung, liver, bone    Alcohol/Drug Father     Hypertension Father     Cancer Maternal Grandmother         lung/liver    Asthma Maternal Grandmother     Other Cancer Maternal Grandmother         Lung, liver, bone    Cancer Maternal Grandfather         skin    Cancer - colorectal Maternal Grandfather     Melanoma Maternal Grandfather     Colon Cancer Maternal Grandfather     Coronary Artery Disease Maternal Grandfather     Cerebrovascular Disease No family hx of     Breast Cancer No family hx of     Prostate Cancer No family hx of        Review of Systems:   !.  Weight Loss: No   2. Difficulty Breathing: No   3. Difficulty Swallowing: No   4. Pain: No    Physical Exam  B/P: 106/64, T: Data Unavailable, P: 61, R: Data Unavailable  Vitals: /64   Pulse 61   Ht 1.715 m (5' 7.5\")   Wt 72.6 kg (160 lb)   LMP 08/28/2024 (Approximate)   SpO2 98%   BMI 24.69 kg/m    BMI= Body mass index is 24.69 kg/m .    General  Appearance - Normal  Head/Face/Scalp:    Skin - Normal    Facial Palpation - Normal    Facial Strength - Normal  Ears: Rinne positive bilaterally at 128 Hz    Pinna - Normal    Canal - Normal   Tympanic membrane - Normal  Nose:    External - Normal    Septum - Normal    Turbinates - Normal    Middle meatus - Normal  Oral Cavity:    Lips - Normal    Floor of Mouth - Normal    Gingiva - Normal    Tongue - Normal    Buccal - Normal    Palate - Normal  Nasopharynx:    Oropharynx:    Tonsils - Normal    Tongue base - Normal    Soft palate - Normal    Posterior pharyngeal wall - Normal  Hypopharynx:  Larynx:    Epiglottis -     Aryepiglottic folds -     Arytenoids -     False vocal cords -     True vocal cords -  Neck Masses - No  Neck lymphatics - no lymphadenopathy  Thyroid - Normal  Salivary glands - Normal    Audiogram - personally reviewed and interpreted, showing very mild SNHL in the left ear and normal " tympanograms   RIGHT:  normal hearing thresholds    LEFT:    normal sloping to borderline-normal and mild sensorineural hearing loss     Tympanogram:    RIGHT: hyper eardrum mobility (Type Ad)    LEFT:   hyper eardrum mobility (Type Ad)     Reflexes (reported by stimulus ear):  RIGHT: Ipsilateral is absent at frequencies tested  RIGHT: Contralateral is absent at frequencies tested  LEFT:   Ipsilateral is present at normal levels  LEFT:   Contralateral is present at normal levels        Speech Reception Threshold:    RIGHT: 10 dB HL    LEFT:   15 dB HL  Word Recognition Score:     RIGHT: 100% at 50 dB HL using NU-6 recorded word list.    LEFT:   96% at 55 dB HL using NU-6 recorded word list.     Radiology - not applicable   Reports:   View films:  Procedures - not applicable  Patient Education:     A/P - Kalina Schreiber is a 53 year old female  Medical Decision Making 1. Mild SNHL left ear

## 2024-09-16 NOTE — NURSING NOTE
"Chief Complaint   Patient presents with    Hearing Evaluation     L Ear Decreased hearing        Vitals:    09/16/24 1330   BP: 106/64   Pulse: 61   SpO2: 98%   Weight: 72.6 kg (160 lb)   Height: 1.715 m (5' 7.5\")     Wt Readings from Last 1 Encounters:   09/16/24 72.6 kg (160 lb)   Emily Delgado MA      "

## 2024-09-16 NOTE — PROGRESS NOTES
AUDIOLOGY REPORT    SUBJECTIVE:  Kalina Schreiber is a 53 year old female who was seen in the Audiology Clinic at the [facility] for audiologic evaluation, referred by Magy Franco C.N.P. .No previous audiograms are available at today's appointment.  The patient reports a recent feeling of hearing loss with a plugged sensation in the left ear. She thought a bug was in her left canal as the ear was itchy. . The patient denies  bilateral tinnitus, bilateral otalgia, family history of hearing loss, and history of noise exposure.  The patient notes limited difficulty with communication in a variety of listening situations.  They were accompanied today by their self.    OBJECTIVE:  Abuse Screening:  Do you feel unsafe at home or work/school? No  Do you feel threatened by someone? No  Does anyone try to keep you from having contact with others, or doing things outside of your home? No  Physical signs of abuse present? No     Fall Risk Screen:  1. Have you fallen two or more times in the past year? No  2. Have you fallen and had an injury in the past year? No      Otoscopic exam indicates ears are clear of cerumen bilaterally     Pure Tone Thresholds assessed using conventional audiometry with good  reliability from 250-8000 Hz bilaterally using insert earphones and circumaural headphones     RIGHT:  normal hearing thresholds    LEFT:    normal sloping to borderline-normal and mild sensorineural hearing loss    Tympanogram:    RIGHT: hyper eardrum mobility (Type Ad)    LEFT:   hyper eardrum mobility (Type Ad)    Reflexes (reported by stimulus ear):  RIGHT: Ipsilateral is absent at frequencies tested  RIGHT: Contralateral is absent at frequencies tested  LEFT:   Ipsilateral is present at normal levels  LEFT:   Contralateral is present at normal levels      Speech Reception Threshold:    RIGHT: 10 dB HL    LEFT:   15 dB HL  Word Recognition Score:     RIGHT: 100% at 50 dB HL using NU-6 recorded word  list.    LEFT:   96% at 55 dB HL using NU-6 recorded word list.      ASSESSMENT:     ICD-10-CM    1. Sensorineural hearing loss (SNHL) of left ear with unrestricted hearing of right ear  H90.42       2. Hearing loss of left ear, unspecified hearing loss type  H91.92 Adult Audiology  Referral          Today s results were discussed with the patient in detail.     PLAN:  Patient was counseled regarding hearing loss and impact on communication.  It is recommended that the patient be seen by Dr. Orellana for medical evaluation of their ears and hearing evaluation. Please call this clinic with questions regarding these results or recommendations.        Julee GRIFFIN-KEREN, #[id number]

## 2024-09-19 ENCOUNTER — ORDERS ONLY (AUTO-RELEASED) (OUTPATIENT)
Dept: CARDIOLOGY | Facility: CLINIC | Age: 54
End: 2024-09-19
Payer: COMMERCIAL

## 2024-09-19 DIAGNOSIS — I49.3 PVC (PREMATURE VENTRICULAR CONTRACTION): ICD-10-CM

## 2024-10-08 DIAGNOSIS — I49.3 PVC (PREMATURE VENTRICULAR CONTRACTION): Primary | ICD-10-CM

## 2024-10-10 ENCOUNTER — PATIENT OUTREACH (OUTPATIENT)
Dept: CARE COORDINATION | Facility: CLINIC | Age: 54
End: 2024-10-10
Payer: COMMERCIAL

## 2024-10-22 ENCOUNTER — HOSPITAL ENCOUNTER (OUTPATIENT)
Dept: CARDIOLOGY | Facility: CLINIC | Age: 54
Discharge: HOME | End: 2024-10-22
Attending: INTERNAL MEDICINE | Admitting: INTERNAL MEDICINE
Payer: COMMERCIAL

## 2024-10-22 ENCOUNTER — TELEPHONE (OUTPATIENT)
Dept: CARDIOLOGY | Facility: CLINIC | Age: 54
End: 2024-10-22
Payer: COMMERCIAL

## 2024-10-22 DIAGNOSIS — I49.3 PVC (PREMATURE VENTRICULAR CONTRACTION): ICD-10-CM

## 2024-10-22 LAB — LVEF ECHO: NORMAL

## 2024-10-22 PROCEDURE — 93306 TTE W/DOPPLER COMPLETE: CPT | Mod: 26 | Performed by: INTERNAL MEDICINE

## 2024-10-22 PROCEDURE — 93306 TTE W/DOPPLER COMPLETE: CPT

## 2024-10-22 NOTE — TELEPHONE ENCOUNTER
[facility] Call Center    Phone Message    May a detailed message be left on voicemail: yes    Reason for Call: Patient called requesting to speak with a member of her care team. Patient states she received approval from the "[employer] insurance company for her echo, and would like to know if  sent this to [facility]. Please call back to address as the patients appt is today.    Thank you!  [facility]

## 2024-10-22 NOTE — TELEPHONE ENCOUNTER
Called and spoke with pt. Pt informed that the referral for the echocardiogram shows as approved by [employer] in her chart. Pt stated her understanding and will be coming for her appt.     Valeria Abarca RN

## 2024-11-04 ENCOUNTER — PATIENT OUTREACH (OUTPATIENT)
Dept: CARE COORDINATION | Facility: CLINIC | Age: 54
End: 2024-11-04
Payer: COMMERCIAL

## 2024-11-10 SDOH — HEALTH STABILITY: PHYSICAL HEALTH: ON AVERAGE, HOW MANY MINUTES DO YOU ENGAGE IN EXERCISE AT THIS LEVEL?: 20 MIN

## 2024-11-10 SDOH — HEALTH STABILITY: PHYSICAL HEALTH: ON AVERAGE, HOW MANY DAYS PER WEEK DO YOU ENGAGE IN MODERATE TO STRENUOUS EXERCISE (LIKE A BRISK WALK)?: 1 DAY

## 2024-11-10 ASSESSMENT — SOCIAL DETERMINANTS OF HEALTH (SDOH): HOW OFTEN DO YOU GET TOGETHER WITH FRIENDS OR RELATIVES?: PATIENT DECLINED

## 2024-11-11 ENCOUNTER — OFFICE VISIT (OUTPATIENT)
Dept: FAMILY MEDICINE | Facility: CLINIC | Age: 54
End: 2024-11-11
Payer: COMMERCIAL

## 2024-11-11 VITALS
OXYGEN SATURATION: 98 % | HEIGHT: 67 IN | DIASTOLIC BLOOD PRESSURE: 70 MMHG | TEMPERATURE: 97.5 F | SYSTOLIC BLOOD PRESSURE: 104 MMHG | HEART RATE: 62 BPM | RESPIRATION RATE: 12 BRPM | BODY MASS INDEX: 26.24 KG/M2 | WEIGHT: 167.2 LBS

## 2024-11-11 DIAGNOSIS — I47.20 VENTRICULAR TACHYCARDIA (H): ICD-10-CM

## 2024-11-11 DIAGNOSIS — G43.109 MIGRAINE WITH AURA AND WITHOUT STATUS MIGRAINOSUS, NOT INTRACTABLE: ICD-10-CM

## 2024-11-11 DIAGNOSIS — Z00.00 ROUTINE GENERAL MEDICAL EXAMINATION AT A HEALTH CARE FACILITY: Primary | ICD-10-CM

## 2024-11-11 PROCEDURE — 99213 OFFICE O/P EST LOW 20 MIN: CPT | Mod: 25 | Performed by: NURSE PRACTITIONER

## 2024-11-11 PROCEDURE — 99396 PREV VISIT EST AGE 40-64: CPT | Performed by: NURSE PRACTITIONER

## 2024-11-11 RX ORDER — SUMATRIPTAN SUCCINATE 25 MG/1
25-50 TABLET ORAL
Qty: 18 TABLET | Refills: 11 | Status: SHIPPED | OUTPATIENT
Start: 2024-11-11

## 2024-11-11 ASSESSMENT — PAIN SCALES - GENERAL: PAINLEVEL_OUTOF10: NO PAIN (0)

## 2024-11-11 NOTE — PROGRESS NOTES
"Preventive Care Visit  [facility]  PENNY Nathan CNP, Family Medicine  Nov 11, 2024        Assessment & Plan     Routine general medical examination at a health care facility    Migraine with aura and without status migrainosus, not intractable  Hasn't had a migraine in years, but likes to have Imitrex on hand just in case.  - SUMAtriptan (IMITREX) 25 MG tablet; Take 1-2 tablets (25-50 mg) by mouth at onset of headache for migraine. May repeat dose in 2 hours.  Do not exceed 200 mg in 24 hours  - OFFICE/OUTPT VISIT,EST,LEVL III    Ventricular tachycardia (H)  Known issue that I take into account for their medical decisions, no current exacerbations or new concerns        BMI  Estimated body mass index is 26.19 kg/m  as calculated from the following:    Height as of this encounter: 1.702 m (5' 7\").    Weight as of this encounter: 75.8 kg (167 lb 3.2 oz).   Weight management plan: Discussed healthy diet and exercise guidelines    Counseling  Appropriate preventive services were addressed with this patient via screening, questionnaire, or discussion as appropriate for fall prevention, nutrition, physical activity, Tobacco-use cessation, social engagement, weight loss and cognition.  Checklist reviewing preventive services available has been given to the patient.  Reviewed patient's diet, addressing concerns and/or questions.   She is at risk for lack of exercise and has been provided with information to increase physical activity for the benefit of her well-being.   She is at risk for psychosocial distress and has been provided with information to reduce risk.       The risks, benefits and treatment options of prescribed medications or other treatments have been discussed with the patient. The patient verbalized their understanding and should call or follow up if no improvement or if they develop further problems.  Magy Franco CNP                Subjective   Kalina is a 53 " year old, presenting for the following:  Physical and Migraine Mgmt (Renew meds)        11/11/2024     1:52 PM   Additional Questions   Roomed by Segundo OVERTON CMA   Accompanied by self          HPI      Migraine   Since your last clinic visit, how have your headaches changed?  No change  How often are you getting headaches or migraines? Less than once a month    Are you able to do normal daily activities when you have a migraine? No  short term until meds are taken   Are you taking rescue/relief medications? (Select all that apply) sumatriptan (Imitrex)  How helpful is your rescue/relief medication?  I get some relief  Are you taking any medications to prevent migraines? (Select all that apply)  No  In the past 4 weeks, how often have you gone to urgent care or the emergency room because of your headaches?  0      Health Care Directive  Patient has a Health Care Directive on file  Advance care planning document is on file and is current.      11/10/2024   General Health   How would you rate your overall physical health? Good   Feel stress (tense, anxious, or unable to sleep) Only a little      (!) STRESS CONCERN      11/10/2024   Nutrition   Three or more servings of calcium each day? (!) NO   Diet: Regular (no restrictions)   How many servings of fruit and vegetables per day? (!) 0-1   How many sweetened beverages each day? 0-1            11/10/2024   Exercise   Days per week of moderate/strenous exercise 1 day   Average minutes spent exercising at this level 20 min      (!) EXERCISE CONCERN      11/10/2024   Social Factors   Frequency of gathering with friends or relatives Patient declined   Worry food won't last until get money to buy more No   Food not last or not have enough money for food? No   Do you have housing? (Housing is defined as stable permanent housing and does not include staying ouside in a car, in a tent, in an abandoned building, in an overnight shelter, or couch-surfing.) Yes   Are you worried about  losing your housing? No   Lack of transportation? No   Unable to get utilities (heat,electricity)? No            11/10/2024   Fall Risk   Fallen 2 or more times in the past year? No    Trouble with walking or balance? No        Patient-reported          11/10/2024   Dental   Dentist two times every year? Yes            11/10/2024   TB Screening   Were you born outside of the [place]? No              Today's PHQ-2 Score:       1/9/2024     2:16 PM   PHQ-2 ( 1999 [employer])   Q1: Little interest or pleasure in doing things 0    Q2: Feeling down, depressed or hopeless 0    PHQ-2 Score 0   Q1: Little interest or pleasure in doing things Not at all   Q2: Feeling down, depressed or hopeless Not at all   PHQ-2 Score 0       Patient-reported         11/10/2024   Substance Use   Alcohol more than 3/day or more than 7/wk No   Do you use any other substances recreationally? No        Social History     Tobacco Use    Smoking status: Never    Smokeless tobacco: Never   Vaping Use    Vaping status: Never Used   Substance Use Topics    Alcohol use: Yes     Comment: 0-2 drinks per day    Drug use: No           12/20/2023   LAST FHS-7 RESULTS   1st degree relative breast or ovarian cancer No   Any relative bilateral breast cancer No   Any male have breast cancer No   Any ONE woman have BOTH breast AND ovarian cancer No   Any woman with breast cancer before 50yrs No   2 or more relatives with breast AND/OR ovarian cancer No   2 or more relatives with breast AND/OR bowel cancer Yes                   11/10/2024   STI Screening   New sexual partner(s) since last STI/HIV test? No        History of abnormal Pap smear: No - age 30- 64 PAP with HPV every 5 years recommended        Latest Ref Rng & Units 10/14/2022     2:06 PM 8/28/2017     1:04 PM 8/6/2014    12:00 AM   PAP / HPV   PAP  Negative for Intraepithelial Lesion or Malignancy (NILM)      PAP (Historical)   NIL  NIL    HPV 16 DNA Negative Negative  Negative     HPV 18 DNA Negative  "Negative  Negative     Other HR HPV Negative Negative  Negative       ASCVD Risk   The 10-year ASCVD risk score (Keiry GONZALEZ, et al., 2019) is: 0.6%    Values used to calculate the score:      Age: 53 years      Sex: Female      Is Non- : No      Diabetic: No      Tobacco smoker: No      Systolic Blood Pressure: 104 mmHg      Is BP treated: No      HDL Cholesterol: 82 mg/dL      Total Cholesterol: 195 mg/dL           Reviewed and updated as needed this visit by Provider                          Review of Systems  Constitutional, HEENT, cardiovascular, pulmonary, GI, , musculoskeletal, neuro, skin, endocrine and psych systems are negative, except as otherwise noted.     Objective    Exam  /70 (BP Location: Right arm, Patient Position: Chair, Cuff Size: Adult Regular)   Pulse 62   Temp 97.5  F (36.4  C) (Tympanic)   Resp 12   Ht 1.702 m (5' 7\")   Wt 75.8 kg (167 lb 3.2 oz)   LMP 10/26/2024 (Approximate)   SpO2 98%   BMI 26.19 kg/m     Estimated body mass index is 26.19 kg/m  as calculated from the following:    Height as of this encounter: 1.702 m (5' 7\").    Weight as of this encounter: 75.8 kg (167 lb 3.2 oz).    Physical Exam  GENERAL: alert and no distress  EYES: Eyes grossly normal to inspection, PERRL and conjunctivae and sclerae normal  HENT: ear canals and TM's normal, nose and mouth without ulcers or lesions  NECK: no adenopathy, no asymmetry, masses, or scars  RESP: lungs clear to auscultation - no rales, rhonchi or wheezes  CV: regular rate and rhythm, normal S1 S2, no S3 or S4, no murmur, click or rub, no peripheral edema  ABDOMEN: soft, nontender, no hepatosplenomegaly, no masses and bowel sounds normal  MS: no gross musculoskeletal defects noted, no edema  SKIN: no suspicious lesions or rashes  NEURO: Normal strength and tone, mentation intact and speech normal  PSYCH: mentation appears normal, affect normal/bright        Signed Electronically by: Magy" PENNY Franco CNP

## 2024-11-11 NOTE — PATIENT INSTRUCTIONS
Patient Education   Preventive Care Advice   This is general advice given by our system to help you stay healthy. However, your care team may have specific advice just for you. Please talk to your care team about your preventive care needs.  Nutrition  Eat 5 or more servings of fruits and vegetables each day.  Try wheat bread, brown rice and whole grain pasta (instead of white bread, rice, and pasta).  Get enough calcium and vitamin D. Check the label on foods and aim for 100% of the RDA (recommended daily allowance).  Lifestyle  Exercise at least 150 minutes each week  (30 minutes a day, 5 days a week).  Do muscle strengthening activities 2 days a week. These help control your weight and prevent disease.  No smoking.  Wear sunscreen to prevent skin cancer.  Have a dental exam and cleaning every 6 months.  Yearly exams  See your health care team every year to talk about:  Any changes in your health.  Any medicines your care team has prescribed.  Preventive care, family planning, and ways to prevent chronic diseases.  Shots (vaccines)   HPV shots (up to age 26), if you've never had them before.  Hepatitis B shots (up to age 59), if you've never had them before.  COVID-19 shot: Get this shot when it's due.  Flu shot: Get a flu shot every year.  Tetanus shot: Get a tetanus shot every 10 years.  Pneumococcal, hepatitis A, and RSV shots: Ask your care team if you need these based on your risk.  Shingles shot (for age 50 and up)  General health tests  Diabetes screening:  Starting at age 35, Get screened for diabetes at least every 3 years.  If you are younger than age 35, ask your care team if you should be screened for diabetes.  Cholesterol test: At age 39, start having a cholesterol test every 5 years, or more often if advised.  Bone density scan (DEXA): At age 50, ask your care team if you should have this scan for osteoporosis (brittle bones).  Hepatitis C: Get tested at least once in your life.  STIs (sexually  transmitted infections)  Before age 24: Ask your care team if you should be screened for STIs.  After age 24: Get screened for STIs if you're at risk. You are at risk for STIs (including HIV) if:  You are sexually active with more than one person.  You don't use condoms every time.  You or a partner was diagnosed with a sexually transmitted infection.  If you are at risk for HIV, ask about PrEP medicine to prevent HIV.  Get tested for HIV at least once in your life, whether you are at risk for HIV or not.  Cancer screening tests  Cervical cancer screening: If you have a cervix, begin getting regular cervical cancer screening tests starting at age 21.  Breast cancer scan (mammogram): If you've ever had breasts, begin having regular mammograms starting at age 40. This is a scan to check for breast cancer.  Colon cancer screening: It is important to start screening for colon cancer at age 45.  Have a colonoscopy test every 10 years (or more often if you're at risk) Or, ask your provider about stool tests like a FIT test every year or Cologuard test every 3 years.  To learn more about your testing options, visit:   .  For help making a decision, visit:   https://bit.ly/vv64489.  Prostate cancer screening test: If you have a prostate, ask your care team if a prostate cancer screening test (PSA) at age 55 is right for you.  Lung cancer screening: If you are a current or former smoker ages 50 to 80, ask your care team if ongoing lung cancer screenings are right for you.  For informational purposes only. Not to replace the advice of your health care provider. Copyright   2023 [employer]. All rights reserved. Clinically reviewed by the [facility]. [employer] Quote 139655 - REV 01/24.

## 2024-12-03 ENCOUNTER — OFFICE VISIT (OUTPATIENT)
Dept: DERMATOLOGY | Facility: CLINIC | Age: 54
End: 2024-12-03
Payer: COMMERCIAL

## 2024-12-03 ENCOUNTER — MYC MEDICAL ADVICE (OUTPATIENT)
Dept: FAMILY MEDICINE | Facility: CLINIC | Age: 54
End: 2024-12-03

## 2024-12-03 DIAGNOSIS — L66.12 FRONTAL FIBROSING ALOPECIA: Primary | ICD-10-CM

## 2024-12-03 PROCEDURE — 11900 INJECT SKIN LESIONS </W 7: CPT | Performed by: PHYSICIAN ASSISTANT

## 2024-12-03 ASSESSMENT — PAIN SCALES - GENERAL: PAINLEVEL_OUTOF10: NO PAIN (0)

## 2024-12-03 NOTE — LETTER
12/3/2024      Kalina Schreiber  [id number] [address] [facility] [id number]      Dear Colleague,    Thank you for referring your patient, Kalina Schreiber, to the [facility]. Please see a copy of my visit note below.    Kalina Schreiber is a pleasant 54 year old year old female patient here today for FFA  she notes FFA has been inflamed more these past two months. She reports using lidex solution more frequently. No extra loss noticed.  Patient has no other skin complaints today.  Remainder of the HPI, Meds, PMH, Allergies, FH, and SH was reviewed in chart.    Pertinent Hx:   History of BCC  Past Medical History:   Diagnosis Date     Allergic rhinitis due to other allergen      Basal cell carcinoma      Solitary cyst of breast      Twin pregnancy, delivered      Ventricular tachycardia (H) 11/11/2024       Past Surgical History:   Procedure Laterality Date     ABDOMEN SURGERY  6/1995 12/2005    C-sections     BREAST SURGERY  10/2002    Augmentation     COLONOSCOPY  02/23/2012    Procedure:COLONOSCOPY; Colonoscopy  ; Surgeon:BOB TY; Location:[facility]     COLONOSCOPY N/A 05/06/2016    Procedure: COLONOSCOPY;  Surgeon: Markus Grossman MD;  Location: [facility]     COSMETIC SURGERY      Breast augmentation     DILATION AND CURETTAGE, OPERATIVE HYSTEROSCOPY, COMBINED N/A 2/28/2024    Procedure: HYSTEROSCOPY, WITH DILATION AND CURETTAGE OF UTERUS;  Surgeon: Nat Segura MD;  Location: [facility]     ESOPHAGOSCOPY, GASTROSCOPY, DUODENOSCOPY (EGD), COMBINED N/A 05/06/2016    Procedure: COMBINED ESOPHAGOSCOPY, GASTROSCOPY, DUODENOSCOPY (EGD);  Surgeon: Markus Grossman MD;  Location: [facility]     INSERT INTRAUTERINE DEVICE N/A 2/28/2024    Procedure: MIRENA INTRAUTERINE DEVICE INSERTION;  Surgeon: Nat Segura MD;  Location: [facility]     LASER SURGERY OF EYE  08/21/2008    Both eyes     MAMMOPLASTY AUGMENTATION       SURGICAL HISTORY OF -       T&A     SURGICAL HISTORY  OF -       Csection     SURGICAL HISTORY OF -       breast augmentation        Family History   Problem Relation Age of Onset     Alcohol/Drug Mother      Cancer Mother         skin, COD: lung, liver, bone cancer     Melanoma Mother      Diabetes Mother         Post transplant onset     Other Cancer Mother         Lung, liver, bone     Alcohol/Drug Father      Hypertension Father      Cancer Maternal Grandmother         lung/liver     Asthma Maternal Grandmother      Other Cancer Maternal Grandmother         Lung, liver, bone     Cancer Maternal Grandfather         skin     Cancer - colorectal Maternal Grandfather      Melanoma Maternal Grandfather      Colon Cancer Maternal Grandfather      Coronary Artery Disease Maternal Grandfather      Cerebrovascular Disease No family hx of      Breast Cancer No family hx of      Prostate Cancer No family hx of        Social History     Socioeconomic History     Marital status:      Spouse name: Not on file     Number of children: Not on file     Years of education: Not on file     Highest education level: Not on file   Occupational History     Employer: SELF   Tobacco Use     Smoking status: Never     Smokeless tobacco: Never   Vaping Use     Vaping status: Never Used   Substance and Sexual Activity     Alcohol use: Yes     Comment: 0-2 drinks per day     Drug use: No     Sexual activity: Yes     Partners: Male     Birth control/protection: Male Surgical     Comment: vas   Other Topics Concern     Parent/sibling w/ CABG, MI or angioplasty before 65F 55M? 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      Surrogate for twins     Social Drivers of Health     Financial Resource Strain: Low Risk  (11/10/2024)    Financial Resource Strain      Within the past 12 months, have you or your family members you live with been unable to get utilities (heat, electricity) when it was really needed?: No   Food Insecurity: Low Risk  (11/10/2024)     Food Insecurity      Within the past 12 months, did you worry that your food would run out before you got money to buy more?: No      Within the past 12 months, did the food you bought just not last and you didn t have money to get more?: No   Transportation Needs: Low Risk  (11/10/2024)    Transportation Needs      Within the past 12 months, has lack of transportation kept you from medical appointments, getting your medicines, non-medical meetings or appointments, work, or from getting things that you need?: No   Physical Activity: Insufficiently Active (11/10/2024)    Exercise Vital Sign      Days of Exercise per Week: 1 day      Minutes of Exercise per Session: 20 min   Stress: No Stress Concern Present (11/10/2024)    [employer] - Occupational Stress Questionnaire      Feeling of Stress : Only a little   Social Connections: Unknown (11/10/2024)    Social Connection and Isolation Panel [NHANES]      Frequency of Communication with Friends and Family: Not on file      Frequency of Social Gatherings with Friends and Family: Patient declined      Attends [employer] Services: Not on file      Active Member of Clubs or Organizations: Not on file      Attends Club or Organization Meetings: Not on file      Marital Status: Not on file   Interpersonal Safety: Low Risk  (11/11/2024)    Interpersonal Safety      Do you feel physically and emotionally safe where you currently live?: Yes      Within the past 12 months, have you been hit, slapped, kicked or otherwise physically hurt by someone?: No      Within the past 12 months, have you been humiliated or emotionally abused in other ways by your partner or ex-partner?: No   Housing Stability: Low Risk  (11/10/2024)    Housing Stability      Do you have housing? : Yes      Are you worried about losing your housing?: No       Outpatient Encounter Medications as of 12/3/2024   Medication Sig Dispense Refill     fluocinonide (LIDEX) 0.05 %  external solution Apply twice daily as needed to frontal scalp. 60 mL 9     metoprolol succinate ER (TOPROL XL) 50 MG 24 hr tablet Take 1 tablet (50 mg) by mouth daily 90 tablet 3     SUMAtriptan (IMITREX) 25 MG tablet Take 1-2 tablets (25-50 mg) by mouth at onset of headache for migraine. May repeat dose in 2 hours.  Do not exceed 200 mg in 24 hours 18 tablet 11     UNKNOWN TO PATIENT Allergy Eye Drops, 1 drop in each eye daily PRN       Facility-Administered Encounter Medications as of 12/3/2024   Medication Dose Route Frequency Provider Last Rate Last Admin     [COMPLETED] triamcinolone acetonide (KENALOG-10) injection 5 mg  5 mg Intra-Lesional Once Talisha Cleveland PA-C   5 mg at 12/03/24 1148             O:   NAD, WDWN, Alert & Oriented, Mood & Affect wnl, Vitals stable   Here today alone   LMP 10/26/2024 (Approximate)    General appearance normal   Vitals stable   Alert, oriented and in no acute distress        Scarring hair loss on frontal scalp, with inflammation       Eyes: Conjunctivae/lids:Normal     ENT: Lips: normal    MSK:Normal    Cardiovascular: peripheral edema none    Pulm: Breathing Normal    Neuro/Psych: Orientation:Alert and Orientedx3 ; Mood/Affect:normal   A/P:  1. Frontal fibrosing alopecia   IL TAC: PGACAC discussed.  Risks including but not limited to injection site reaction, bruising, no resolution.  All questions answered and entertained to patient s satisfaction.  Informed consent obtained.  IL TAC in concentration of 5 mg/ml was injected ID to  FFA.  Total injected was  0.9 ml.  Patient tolerated without complications and given wound care instructions, including not to move product around.  Return in 4 weeks for follow-up and possible additional IL TAC.      Clinic Administered Medication Documentation        Patient was given 0.9 ml of Kenalog 5. Prior to medication administration, verified patient's identity using patient s name and date of birth. Please see MAR and medication  order for additional information. Patient instructed to remain in clinic for 15 minutes and report any adverse reaction to staff immediately.    Vial/Syringe: Multi dose vial      Again, thank you for allowing me to participate in the care of your patient.        Sincerely,        Talisha Aguilar PA-C

## 2024-12-03 NOTE — PROGRESS NOTES
Kalina Schreiber is a pleasant 54 year old year old female patient here today for FFA  she notes FFA has been inflamed more these past two months. She reports using lidex solution more frequently. No extra loss noticed.  Patient has no other skin complaints today.  Remainder of the HPI, Meds, PMH, Allergies, FH, and SH was reviewed in chart.    Pertinent Hx:   History of BCC  Past Medical History:   Diagnosis Date    Allergic rhinitis due to other allergen     Basal cell carcinoma     Solitary cyst of breast     Twin pregnancy, delivered     Ventricular tachycardia (H) 11/11/2024       Past Surgical History:   Procedure Laterality Date    ABDOMEN SURGERY  6/1995 12/2005    C-sections    BREAST SURGERY  10/2002    Augmentation    COLONOSCOPY  02/23/2012    Procedure:COLONOSCOPY; Colonoscopy  ; Surgeon:BOB TY; Location:[facility]    COLONOSCOPY N/A 05/06/2016    Procedure: COLONOSCOPY;  Surgeon: Markus Grossman MD;  Location: [facility]    COSMETIC SURGERY      Breast augmentation    DILATION AND CURETTAGE, OPERATIVE HYSTEROSCOPY, COMBINED N/A 2/28/2024    Procedure: HYSTEROSCOPY, WITH DILATION AND CURETTAGE OF UTERUS;  Surgeon: Nat Segura MD;  Location: [facility]    ESOPHAGOSCOPY, GASTROSCOPY, DUODENOSCOPY (EGD), COMBINED N/A 05/06/2016    Procedure: COMBINED ESOPHAGOSCOPY, GASTROSCOPY, DUODENOSCOPY (EGD);  Surgeon: Markus Grossman MD;  Location: [facility]    INSERT INTRAUTERINE DEVICE N/A 2/28/2024    Procedure: MIRENA INTRAUTERINE DEVICE INSERTION;  Surgeon: Nat Segura MD;  Location: [facility]    LASER SURGERY OF EYE  08/21/2008    Both eyes    MAMMOPLASTY AUGMENTATION      SURGICAL HISTORY OF -       T&A    SURGICAL HISTORY OF -       Csection    SURGICAL HISTORY OF -       breast augmentation        Family History   Problem Relation Age of Onset    Alcohol/Drug Mother     Cancer Mother         skin, COD: lung, liver, bone cancer    Melanoma Mother     Diabetes Mother          Post transplant onset    Other Cancer Mother         Lung, liver, bone    Alcohol/Drug Father     Hypertension Father     Cancer Maternal Grandmother         lung/liver    Asthma Maternal Grandmother     Other Cancer Maternal Grandmother         Lung, liver, bone    Cancer Maternal Grandfather         skin    Cancer - colorectal Maternal Grandfather     Melanoma Maternal Grandfather     Colon Cancer Maternal Grandfather     Coronary Artery Disease Maternal Grandfather     Cerebrovascular Disease No family hx of     Breast Cancer No family hx of     Prostate Cancer No family hx of        Social History     Socioeconomic History    Marital status:      Spouse name: Not on file    Number of children: Not on file    Years of education: Not on file    Highest education level: Not on file   Occupational History     Employer: SELF   Tobacco Use    Smoking status: Never    Smokeless tobacco: Never   Vaping Use    Vaping status: Never Used   Substance and Sexual Activity    Alcohol use: Yes     Comment: 0-2 drinks per day    Drug use: No    Sexual activity: Yes     Partners: Male     Birth control/protection: Male Surgical     Comment: vas   Other Topics Concern    Parent/sibling w/ CABG, MI or angioplasty before 65F 55M? 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      Surrogate for twins     Social Drivers of Health     Financial Resource Strain: Low Risk  (11/10/2024)    Financial Resource Strain     Within the past 12 months, have you or your family members you live with been unable to get utilities (heat, electricity) when it was really needed?: No   Food Insecurity: Low Risk  (11/10/2024)    Food Insecurity     Within the past 12 months, did you worry that your food would run out before you got money to buy more?: No     Within the past 12 months, did the food you bought just not last and you didn t have money to get more?: No   Transportation Needs: Low Risk  (11/10/2024)     Transportation Needs     Within the past 12 months, has lack of transportation kept you from medical appointments, getting your medicines, non-medical meetings or appointments, work, or from getting things that you need?: No   Physical Activity: Insufficiently Active (11/10/2024)    Exercise Vital Sign     Days of Exercise per Week: 1 day     Minutes of Exercise per Session: 20 min   Stress: No Stress Concern Present (11/10/2024)    [employer] [place] [employer] - Occupational Stress Questionnaire     Feeling of Stress : Only a little   Social Connections: Unknown (11/10/2024)    Social Connection and Isolation Panel [NHANES]     Frequency of Communication with Friends and Family: Not on file     Frequency of Social Gatherings with Friends and Family: Patient declined     Attends Muslim Services: Not on file     Active Member of Clubs or Organizations: Not on file     Attends Club or Organization Meetings: Not on file     Marital Status: Not on file   Interpersonal Safety: Low Risk  (11/11/2024)    Interpersonal Safety     Do you feel physically and emotionally safe where you currently live?: Yes     Within the past 12 months, have you been hit, slapped, kicked or otherwise physically hurt by someone?: No     Within the past 12 months, have you been humiliated or emotionally abused in other ways by your partner or ex-partner?: No   Housing Stability: Low Risk  (11/10/2024)    Housing Stability     Do you have housing? : Yes     Are you worried about losing your housing?: No       Outpatient Encounter Medications as of 12/3/2024   Medication Sig Dispense Refill    fluocinonide (LIDEX) 0.05 % external solution Apply twice daily as needed to frontal scalp. 60 mL 9    metoprolol succinate ER (TOPROL XL) 50 MG 24 hr tablet Take 1 tablet (50 mg) by mouth daily 90 tablet 3    SUMAtriptan (IMITREX) 25 MG tablet Take 1-2 tablets (25-50 mg) by mouth at onset of headache for migraine. May repeat dose in 2  hours.  Do not exceed 200 mg in 24 hours 18 tablet 11    UNKNOWN TO PATIENT Allergy Eye Drops, 1 drop in each eye daily PRN       Facility-Administered Encounter Medications as of 12/3/2024   Medication Dose Route Frequency Provider Last Rate Last Admin    [COMPLETED] triamcinolone acetonide (KENALOG-10) injection 5 mg  5 mg Intra-Lesional Once Talisha Cleveland PA-C   5 mg at 12/03/24 1148             O:   NAD, WDWN, Alert & Oriented, Mood & Affect wnl, Vitals stable   Here today alone   LMP 10/26/2024 (Approximate)    General appearance normal   Vitals stable   Alert, oriented and in no acute distress        Scarring hair loss on frontal scalp, with inflammation       Eyes: Conjunctivae/lids:Normal     ENT: Lips: normal    MSK:Normal    Cardiovascular: peripheral edema none    Pulm: Breathing Normal    Neuro/Psych: Orientation:Alert and Orientedx3 ; Mood/Affect:normal   A/P:  1. Frontal fibrosing alopecia   IL TAC: PGACAC discussed.  Risks including but not limited to injection site reaction, bruising, no resolution.  All questions answered and entertained to patient s satisfaction.  Informed consent obtained.  IL TAC in concentration of 5 mg/ml was injected ID to  FFA.  Total injected was  0.9 ml.  Patient tolerated without complications and given wound care instructions, including not to move product around.  Return in 4 weeks for follow-up and possible additional IL TAC.      Clinic Administered Medication Documentation        Patient was given 0.9 ml of Kenalog 5. Prior to medication administration, verified patient's identity using patient s name and date of birth. Please see MAR and medication order for additional information. Patient instructed to remain in clinic for 15 minutes and report any adverse reaction to staff immediately.    Vial/Syringe: Multi dose vial

## 2024-12-23 ENCOUNTER — HOSPITAL ENCOUNTER (OUTPATIENT)
Dept: MAMMOGRAPHY | Facility: CLINIC | Age: 54
Discharge: HOME | End: 2024-12-23
Attending: NURSE PRACTITIONER | Admitting: NURSE PRACTITIONER
Payer: COMMERCIAL

## 2024-12-23 DIAGNOSIS — Z12.31 VISIT FOR SCREENING MAMMOGRAM: ICD-10-CM

## 2024-12-23 PROCEDURE — 77067 SCR MAMMO BI INCL CAD: CPT

## 2025-03-17 NOTE — PROGRESS NOTES
"[facility]    I had the pleasure of seeing Kalina when she came for follow up of PVCs.  This 54 year old sees Dr. Lau for her history of:    PVCs - 10% on Holter 2010, 31% burden on ZioPatch 2023, started on BB therapy with improved PVC burden to 23%. Up to 26% on ZioPatch 10/2024  Cardiac stress MRI 2/2023 wnl  RVOT tract origin  Echo 10/2024 with nl LVEF (PVC burden 26% on ZioPatch 10/2024)      Last Visit & Interval History:  Dr. Lau met Kalina during a virtual visit 3/2024 at which time they discussed RVOT PVCs, with most recent Holter 9/2023 with PVC burden down to 9% on Metoprolol XL 50 mg daily. Updated ZioPatch in 6 months recommended, with annual follow-up planned. ZioPatch showed 26% PVC burden and updated echo 10/2024 with nl LVEF 55-60%.     Today's Visit:  Today Kalina is doing well. She says that her palpitations have improved on Metoprolol XL 50 mg daily.     She describes her early beats as \"flutters\" in her chest and can tell when she has PVCs. She denies fatigue, lightheadedness, dizziness. No presyncope/ syncope. No CP, SOB/PERAZA. No edema.    She asked when PVCs had first been identified and related that ER visit 11/2010 found them on EKG when she presented with palpitations.     VITALS:  Vitals: /76   Pulse 59   Resp 20   Ht 1.702 m (5' 7\")   Wt 77.2 kg (170 lb 1.6 oz)   SpO2 99%   BMI 26.64 kg/m      Diagnostic Testing:  EKG today, which I overread, showed SB with 56 bpm on Metoprolol XL 50 mg every day   Echocardiogram 10/2024 - LVEF 55-60%. No RWMA. Nl RV. Nl atria. 1+MR. 1+TR.   ZioPatch 10/2024 - SR with 26% PVC burden. In SR, avg HR 78 (range  bpm). 4 episodes of VT, longest episode 5 beats. 26% PVC burden. On Metoprolol XL 50 mg daily  Stress cMRI 2/2023 wnl    Plan:  Follow-up in one year (3/2026) with Dr. Lau (virtual)  with updated ZioPatch x 3d  (will be mailed 2/2026)  Future monitoring with ZioPatch every 2 years unless increase with PVC " burden or issues with medication. 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  Stress cMRI 2023 wnl, echo 10/2024 with nl LVEF despite 26% burden on ZioPatch 10/2024  Remains on Metoprolol XL 50 mg daily  EKG today shows SB with 56 bpm on Metoprolol XL 50 mg daily; no fatigue, lightheadedness, dizziness      PLAN:  Continue Metoprolol XL 50 mg every day   If symptoms not well controled with Metoprolol, consider changing to CCB or in combination as BP allows  Consider ablation procedure if PVC symptoms worsen, increase in PVC burden, or decrease in LVEF  Patient not interested in ablation at this time and prefers medication management  Consider repeat echo every 1-2 years if PVC burden remains elevated  Patient will follow up in 1 year with updated ZioPatch, but will call if any changes with symptoms    ALLISON Eason, MIKAELA Winkler was present with the [employer]OM[employer] student who participated in the service and in the documentation of the note.  I have verified the history and personally performed the physical exam and medical decision making.  I agree with the assessment and plan of care as documented in the note.       Items personally reviewed: vitals, labs, and EKG and agree with the interpretation documented in the note.  I agree with the interpretation documented in the note and the assessment and plan as above.      Mimi Keys PA-C, ROD      Orders Placed This Encounter   Procedures    Follow-Up with Cardiology EP    EKG 12-lead complete w/read - Clinics (performed today)     Orders Placed This Encounter   Medications    cetirizine HCl (ZYRTEC ALLERGY) 10 MG CAPS     Sig: Take by mouth.    metoprolol succinate ER (TOPROL XL) 50 MG 24 hr tablet     Sig: Take 1 tablet (50 mg) by mouth daily.     Dispense:  90 tablet     Refill:  3     Keep on file until pt due for refills     Medications Discontinued During This Encounter   Medication Reason    UNKNOWN  "TO PATIENT Alternate therapy    metoprolol succinate ER (TOPROL XL) 50 MG 24 hr tablet Reorder (No AVS)         Encounter Diagnoses   Name Primary?    PVC (premature ventricular contraction) Yes    Ventricular tachycardia (H)        CURRENT MEDICATIONS:  Current Outpatient Medications   Medication Sig Dispense Refill    cetirizine HCl (ZYRTEC ALLERGY) 10 MG CAPS Take by mouth.      fluocinonide (LIDEX) 0.05 % external solution Apply twice daily as needed to frontal scalp. 60 mL 9    metoprolol succinate ER (TOPROL XL) 50 MG 24 hr tablet Take 1 tablet (50 mg) by mouth daily. 90 tablet 3    SUMAtriptan (IMITREX) 25 MG tablet Take 1-2 tablets (25-50 mg) by mouth at onset of headache for migraine. May repeat dose in 2 hours.  Do not exceed 200 mg in 24 hours 18 tablet 11       ALLERGIES     Allergies   Allergen Reactions    Nkda [No Known Drug Allergy]          Review of Systems:  Skin:  Negative     Eyes:  Negative    ENT:  Negative    Respiratory:  Negative for shortness of breath, dyspnea on exertion  Cardiovascular:  Negative for, chest pain, fatigue, lightheadedness, dizziness Positive for, palpitations  Gastroenterology: Negative    Genitourinary:  Negative    Musculoskeletal:  Negative    Neurologic:  Negative    Psychiatric:  Negative    Heme/Lymph/Imm:  Negative    Endocrine:  Negative      Physical Exam:  Vitals: /76   Pulse 59   Resp 20   Ht 1.702 m (5' 7\")   Wt 77.2 kg (170 lb 1.6 oz)   SpO2 99%   BMI 26.64 kg/m      Constitutional:  cooperative, alert and oriented, well developed, well nourished, in no acute distress        Skin:  warm and dry to the touch, no apparent skin lesions or masses noted        Head:  normocephalic        Eyes:  sclera white        ENT:  not assessed this visit        Neck:  no stiffness        Chest:  clear to auscultation        Cardiac: regular rhythm, normal S1/S2, no S3 or S4, apical impulse not displaced, no murmurs, gallops or rubs bradycardic            "     Abdomen:  abdomen soft        Vascular: pulses full and equal                                      Extremities and Back:  no edema        Neurological:  no gross motor deficits            PAST MEDICAL HISTORY:  Past Medical History:   Diagnosis Date    Allergic rhinitis due to other allergen     Basal cell carcinoma     Solitary cyst of breast     Twin pregnancy, delivered     Ventricular tachycardia (H) 11/11/2024       PAST SURGICAL HISTORY:  Past Surgical History:   Procedure Laterality Date    ABDOMEN SURGERY  6/1995 12/2005    C-sections    BREAST SURGERY  10/2002    Augmentation    COLONOSCOPY  02/23/2012    Procedure:COLONOSCOPY; Colonoscopy  ; Surgeon:BOB TY; Location:[facility]    COLONOSCOPY N/A 05/06/2016    Procedure: COLONOSCOPY;  Surgeon: Markus Grossman MD;  Location: [facility]    COSMETIC SURGERY      Breast augmentation    DILATION AND CURETTAGE, OPERATIVE HYSTEROSCOPY, COMBINED N/A 2/28/2024    Procedure: HYSTEROSCOPY, WITH DILATION AND CURETTAGE OF UTERUS;  Surgeon: Nat Segura MD;  Location: [facility]    ESOPHAGOSCOPY, GASTROSCOPY, DUODENOSCOPY (EGD), COMBINED N/A 05/06/2016    Procedure: COMBINED ESOPHAGOSCOPY, GASTROSCOPY, DUODENOSCOPY (EGD);  Surgeon: Markus Grossman MD;  Location: [facility]    INSERT INTRAUTERINE DEVICE N/A 2/28/2024    Procedure: MIRENA INTRAUTERINE DEVICE INSERTION;  Surgeon: Nat Segura MD;  Location: [facility]    LASER SURGERY OF EYE  08/21/2008    Both eyes    MAMMOPLASTY AUGMENTATION      SURGICAL HISTORY OF -       T&A    SURGICAL HISTORY OF -       Csection    SURGICAL HISTORY OF -       breast augmentation       FAMILY HISTORY:  Family History   Problem Relation Age of Onset    Alcohol/Drug Mother     Cancer Mother         skin, COD: lung, liver, bone cancer    Melanoma Mother     Diabetes Mother         Post transplant onset    Other Cancer Mother         Lung, liver, bone    Alcohol/Drug Father     Hypertension Father      Cancer Maternal Grandmother         lung/liver    Asthma Maternal Grandmother     Other Cancer Maternal Grandmother         Lung, liver, bone    Cancer Maternal Grandfather         skin    Cancer - colorectal Maternal Grandfather     Melanoma Maternal Grandfather     Colon Cancer Maternal Grandfather     Coronary Artery Disease Maternal Grandfather     Cerebrovascular Disease No family hx of     Breast Cancer No family hx of     Prostate Cancer No family hx of        SOCIAL HISTORY:  Social History     Socioeconomic History    Marital status:      Spouse name: None    Number of children: None    Years of education: None    Highest education level: None   Occupational History     Employer: SELF   Tobacco Use    Smoking status: Never    Smokeless tobacco: Never   Vaping Use    Vaping status: Never Used   Substance and Sexual Activity    Alcohol use: Yes     Comment: 0-2 drinks per day    Drug use: No    Sexual activity: Yes     Partners: Male     Birth control/protection: Male Surgical     Comment: vas   Other Topics Concern    Parent/sibling w/ CABG, MI or angioplasty before 65F 55M? 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      Surrogate for twins     Social Drivers of Health     Financial Resource Strain: Low Risk  (11/10/2024)    Financial Resource Strain     Within the past 12 months, have you or your family members you live with been unable to get utilities (heat, electricity) when it was really needed?: No   Food Insecurity: Low Risk  (11/10/2024)    Food Insecurity     Within the past 12 months, did you worry that your food would run out before you got money to buy more?: No     Within the past 12 months, did the food you bought just not last and you didn t have money to get more?: No   Transportation Needs: Low Risk  (11/10/2024)    Transportation Needs     Within the past 12 months, has lack of transportation kept you from medical appointments, getting your  medicines, non-medical meetings or appointments, work, or from getting things that you need?: No   Physical Activity: Insufficiently Active (11/10/2024)    Exercise Vital Sign     Days of Exercise per Week: 1 day     Minutes of Exercise per Session: 20 min   Stress: No Stress Concern Present (11/10/2024)    [employer] [place] [employer] - Occupational Stress Questionnaire     Feeling of Stress : Only a little   Social Connections: Unknown (11/10/2024)    Social Connection and Isolation Panel [NHANES]     Frequency of Social Gatherings with Friends and Family: Patient declined   Interpersonal Safety: Low Risk  (11/11/2024)    Interpersonal Safety     Do you feel physically and emotionally safe where you currently live?: Yes     Within the past 12 months, have you been hit, slapped, kicked or otherwise physically hurt by someone?: No     Within the past 12 months, have you been humiliated or emotionally abused in other ways by your partner or ex-partner?: No   Housing Stability: Low Risk  (11/10/2024)    Housing Stability     Do you have housing? : Yes     Are you worried about losing your housing?: No

## 2025-03-18 ENCOUNTER — OFFICE VISIT (OUTPATIENT)
Dept: CARDIOLOGY | Facility: CLINIC | Age: 55
End: 2025-03-18
Payer: COMMERCIAL

## 2025-03-18 VITALS
DIASTOLIC BLOOD PRESSURE: 76 MMHG | RESPIRATION RATE: 20 BRPM | HEART RATE: 59 BPM | HEIGHT: 67 IN | SYSTOLIC BLOOD PRESSURE: 121 MMHG | OXYGEN SATURATION: 99 % | BODY MASS INDEX: 26.7 KG/M2 | WEIGHT: 170.1 LBS

## 2025-03-18 DIAGNOSIS — I47.20 VENTRICULAR TACHYCARDIA (H): ICD-10-CM

## 2025-03-18 DIAGNOSIS — I49.3 PVC (PREMATURE VENTRICULAR CONTRACTION): Primary | ICD-10-CM

## 2025-03-18 PROCEDURE — 3074F SYST BP LT 130 MM HG: CPT | Performed by: PHYSICIAN ASSISTANT

## 2025-03-18 PROCEDURE — 99214 OFFICE O/P EST MOD 30 MIN: CPT | Performed by: PHYSICIAN ASSISTANT

## 2025-03-18 PROCEDURE — 93000 ELECTROCARDIOGRAM COMPLETE: CPT | Performed by: PHYSICIAN ASSISTANT

## 2025-03-18 PROCEDURE — 3078F DIAST BP <80 MM HG: CPT | Performed by: PHYSICIAN ASSISTANT

## 2025-03-18 RX ORDER — METOPROLOL SUCCINATE 50 MG/1
50 TABLET, EXTENDED RELEASE ORAL DAILY
Qty: 90 TABLET | Refills: 3 | Status: SHIPPED | OUTPATIENT
Start: 2025-03-18

## 2025-03-18 RX ORDER — CETIRIZINE HYDROCHLORIDE 10 MG/1
CAPSULE, LIQUID FILLED ORAL
COMMUNITY

## 2025-03-18 NOTE — LETTER
"3/18/2025    Magy Franco, APRN CNP  [address] [facility] [place] [address]    RE: Kalina Schreiber       Dear Colleague,     I had the pleasure of seeing Kalina Schreiber in the [facility].  [facility]    I had the pleasure of seeing Kalina when she came for follow up of PVCs.  This 54 year old sees Dr. Lau for her history of:    PVCs - 10% on Holter 2010, 31% burden on ZioPatch 2023, started on BB therapy with improved PVC burden to 23%. Up to 26% on ZioPatch 10/2024  Cardiac stress MRI 2/2023 wnl  RVOT tract origin  Echo 10/2024 with nl LVEF (PVC burden 26% on ZioPatch 10/2024)      Last Visit & Interval History:  Dr. Lau met Kalina during a virtual visit 3/2024 at which time they discussed RVOT PVCs, with most recent Holter 9/2023 with PVC burden down to 9% on Metoprolol XL 50 mg daily. Updated ZioPatch in 6 months recommended, with annual follow-up planned. ZioPatch showed 26% PVC burden and updated echo 10/2024 with nl LVEF 55-60%.     Today's Visit:  Today Kalina is doing well. She says that her palpitations have improved on Metoprolol XL 50 mg daily.     She describes her early beats as \"flutters\" in her chest and can tell when she has PVCs. She denies fatigue, lightheadedness, dizziness. No presyncope/ syncope. No CP, SOB/PERAZA. No edema.    She asked when PVCs had first been identified and related that ER visit 11/2010 found them on EKG when she presented with palpitations.     VITALS:  Vitals: /76   Pulse 59   Resp 20   Ht 1.702 m (5' 7\")   Wt 77.2 kg (170 lb 1.6 oz)   SpO2 99%   BMI 26.64 kg/m      Diagnostic Testing:  EKG today, which I overread, showed SB with 56 bpm on Metoprolol XL 50 mg every day   Echocardiogram 10/2024 - LVEF 55-60%. No RWMA. Nl RV. Nl atria. 1+MR. 1+TR.   ZioPatch 10/2024 - SR with 26% PVC burden. In SR, avg HR 78 (range  bpm). 4 episodes of VT, longest episode 5 beats. 26% PVC burden. On Metoprolol XL 50 mg " daily  Stress cMRI 2/2023 wnl    Plan:  Follow-up in one year (3/2026) with Dr. Lau (virtual)  with updated ZioPatch x 3d  (will be mailed 2/2026)  Future monitoring with ZioPatch every 2 years unless increase with PVC burden or issues with medication. 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  Stress cMRI 2023 wnl, echo 10/2024 with nl LVEF despite 26% burden on ZioPatch 10/2024  Remains on Metoprolol XL 50 mg daily  EKG today shows SB with 56 bpm on Metoprolol XL 50 mg daily; no fatigue, lightheadedness, dizziness      PLAN:  Continue Metoprolol XL 50 mg every day   If symptoms not well controled with Metoprolol, consider changing to CCB or in combination as BP allows  Consider ablation procedure if PVC symptoms worsen, increase in PVC burden, or decrease in LVEF  Patient not interested in ablation at this time and prefers medication management  Consider repeat echo every 1-2 years if PVC burden remains elevated  Patient will follow up in 1 year with updated ZioPatch, but will call if any changes with symptoms    ALLISON Eason    I, MIKAELA Winkler was present with the [employer] student who participated in the service and in the documentation of the note.  I have verified the history and personally performed the physical exam and medical decision making.  I agree with the assessment and plan of care as documented in the note.       Items personally reviewed: vitals, labs, and EKG and agree with the interpretation documented in the note.  I agree with the interpretation documented in the note and the assessment and plan as above.      Mimi Keys PA-C, ROD      Orders Placed This Encounter   Procedures     Follow-Up with Cardiology EP     EKG 12-lead complete w/read - Clinics (performed today)     Orders Placed This Encounter   Medications     cetirizine HCl (ZYRTEC ALLERGY) 10 MG CAPS     Sig: Take by mouth.     metoprolol succinate ER (TOPROL XL) 50 MG  "24 hr tablet     Sig: Take 1 tablet (50 mg) by mouth daily.     Dispense:  90 tablet     Refill:  3     Keep on file until pt due for refills     Medications Discontinued During This Encounter   Medication Reason     UNKNOWN TO PATIENT Alternate therapy     metoprolol succinate ER (TOPROL XL) 50 MG 24 hr tablet Reorder (No AVS)         Encounter Diagnoses   Name Primary?     PVC (premature ventricular contraction) Yes     Ventricular tachycardia (H)        CURRENT MEDICATIONS:  Current Outpatient Medications   Medication Sig Dispense Refill     cetirizine HCl (ZYRTEC ALLERGY) 10 MG CAPS Take by mouth.       fluocinonide (LIDEX) 0.05 % external solution Apply twice daily as needed to frontal scalp. 60 mL 9     metoprolol succinate ER (TOPROL XL) 50 MG 24 hr tablet Take 1 tablet (50 mg) by mouth daily. 90 tablet 3     SUMAtriptan (IMITREX) 25 MG tablet Take 1-2 tablets (25-50 mg) by mouth at onset of headache for migraine. May repeat dose in 2 hours.  Do not exceed 200 mg in 24 hours 18 tablet 11       ALLERGIES     Allergies   Allergen Reactions     Nkda [No Known Drug Allergy]          Review of Systems:  Skin:  Negative     Eyes:  Negative    ENT:  Negative    Respiratory:  Negative for shortness of breath, dyspnea on exertion  Cardiovascular:  Negative for, chest pain, fatigue, lightheadedness, dizziness Positive for, palpitations  Gastroenterology: Negative    Genitourinary:  Negative    Musculoskeletal:  Negative    Neurologic:  Negative    Psychiatric:  Negative    Heme/Lymph/Imm:  Negative    Endocrine:  Negative      Physical Exam:  Vitals: /76   Pulse 59   Resp 20   Ht 1.702 m (5' 7\")   Wt 77.2 kg (170 lb 1.6 oz)   SpO2 99%   BMI 26.64 kg/m      Constitutional:  cooperative, alert and oriented, well developed, well nourished, in no acute distress        Skin:  warm and dry to the touch, no apparent skin lesions or masses noted        Head:  normocephalic        Eyes:  sclera white        ENT:  " not assessed this visit        Neck:  no stiffness        Chest:  clear to auscultation        Cardiac: regular rhythm, normal S1/S2, no S3 or S4, apical impulse not displaced, no murmurs, gallops or rubs bradycardic                Abdomen:  abdomen soft        Vascular: pulses full and equal                                      Extremities and Back:  no edema        Neurological:  no gross motor deficits            PAST MEDICAL HISTORY:  Past Medical History:   Diagnosis Date     Allergic rhinitis due to other allergen      Basal cell carcinoma      Solitary cyst of breast      Twin pregnancy, delivered      Ventricular tachycardia (H) 11/11/2024       PAST SURGICAL HISTORY:  Past Surgical History:   Procedure Laterality Date     ABDOMEN SURGERY  6/1995 12/2005    C-sections     BREAST SURGERY  10/2002    Augmentation     COLONOSCOPY  02/23/2012    Procedure:COLONOSCOPY; Colonoscopy  ; Surgeon:BOB TY; Location:[facility]     COLONOSCOPY N/A 05/06/2016    Procedure: COLONOSCOPY;  Surgeon: Markus Grossman MD;  Location: [facility]     COSMETIC SURGERY      Breast augmentation     DILATION AND CURETTAGE, OPERATIVE HYSTEROSCOPY, COMBINED N/A 2/28/2024    Procedure: HYSTEROSCOPY, WITH DILATION AND CURETTAGE OF UTERUS;  Surgeon: Nat Segura MD;  Location: [facility]     ESOPHAGOSCOPY, GASTROSCOPY, DUODENOSCOPY (EGD), COMBINED N/A 05/06/2016    Procedure: COMBINED ESOPHAGOSCOPY, GASTROSCOPY, DUODENOSCOPY (EGD);  Surgeon: Markus Grossman MD;  Location: [facility]     INSERT INTRAUTERINE DEVICE N/A 2/28/2024    Procedure: MIRENA INTRAUTERINE DEVICE INSERTION;  Surgeon: Nat Segura MD;  Location: [facility]     LASER SURGERY OF EYE  08/21/2008    Both eyes     MAMMOPLASTY AUGMENTATION       SURGICAL HISTORY OF -       T&A     SURGICAL HISTORY OF -       Csection     SURGICAL HISTORY OF -       breast augmentation       FAMILY HISTORY:  Family History   Problem Relation Age of Onset      Alcohol/Drug Mother      Cancer Mother         skin, COD: lung, liver, bone cancer     Melanoma Mother      Diabetes Mother         Post transplant onset     Other Cancer Mother         Lung, liver, bone     Alcohol/Drug Father      Hypertension Father      Cancer Maternal Grandmother         lung/liver     Asthma Maternal Grandmother      Other Cancer Maternal Grandmother         Lung, liver, bone     Cancer Maternal Grandfather         skin     Cancer - colorectal Maternal Grandfather      Melanoma Maternal Grandfather      Colon Cancer Maternal Grandfather      Coronary Artery Disease Maternal Grandfather      Cerebrovascular Disease No family hx of      Breast Cancer No family hx of      Prostate Cancer No family hx of        SOCIAL HISTORY:  Social History     Socioeconomic History     Marital status:      Spouse name: None     Number of children: None     Years of education: None     Highest education level: None   Occupational History     Employer: SELF   Tobacco Use     Smoking status: Never     Smokeless tobacco: Never   Vaping Use     Vaping status: Never Used   Substance and Sexual Activity     Alcohol use: Yes     Comment: 0-2 drinks per day     Drug use: No     Sexual activity: Yes     Partners: Male     Birth control/protection: Male Surgical     Comment: vas   Other Topics Concern     Parent/sibling w/ CABG, MI or angioplasty before 65F 55M? 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      Surrogate for twins     Social Drivers of Health     Financial Resource Strain: Low Risk  (11/10/2024)    Financial Resource Strain      Within the past 12 months, have you or your family members you live with been unable to get utilities (heat, electricity) when it was really needed?: No   Food Insecurity: Low Risk  (11/10/2024)    Food Insecurity      Within the past 12 months, did you worry that your food would run out before you got money to buy more?: No       Within the past 12 months, did the food you bought just not last and you didn t have money to get more?: No   Transportation Needs: Low Risk  (11/10/2024)    Transportation Needs      Within the past 12 months, has lack of transportation kept you from medical appointments, getting your medicines, non-medical meetings or appointments, work, or from getting things that you need?: No   Physical Activity: Insufficiently Active (11/10/2024)    Exercise Vital Sign      Days of Exercise per Week: 1 day      Minutes of Exercise per Session: 20 min   Stress: No Stress Concern Present (11/10/2024)    [employer] [place] [employer] - Occupational Stress Questionnaire      Feeling of Stress : Only a little   Social Connections: Unknown (11/10/2024)    Social Connection and Isolation Panel [NHANES]      Frequency of Social Gatherings with Friends and Family: Patient declined   Interpersonal Safety: Low Risk  (11/11/2024)    Interpersonal Safety      Do you feel physically and emotionally safe where you currently live?: Yes      Within the past 12 months, have you been hit, slapped, kicked or otherwise physically hurt by someone?: No      Within the past 12 months, have you been humiliated or emotionally abused in other ways by your partner or ex-partner?: No   Housing Stability: Low Risk  (11/10/2024)    Housing Stability      Do you have housing? : Yes      Are you worried about losing your housing?: No       Thank you for allowing me to participate in the care of your patient.      Sincerely,     Karen Keys PA-C     [facility]  cc:   Karen Keys PA-C  [address] [address]

## 2025-03-18 NOTE — PATIENT INSTRUCTIONS
Kalina - it was nice to see you today!     At your visit today we reviewed:    You're doing really well on Metoprolol XL   EKG today looked good!     Medication Changes:    NONE    Recommendations:  Call if issues    Follow-up:    See Dr. Lau (virtual OK) with 3 day ZioPatch mailed out prior for cardiology follow up in 1 year but CALL Cardiology nurses Nena & Robyn @ [phone] for any issues, questions or concerns in the interim.      To schedule a future appointment, we kindly ask that you call cardiology scheduling at [phone] three months prior to requested visit date.    Important Phone Numbers for [facility]):    [facility] Nena Carlson: [phone]

## 2025-06-11 DIAGNOSIS — L66.12 FRONTAL FIBROSING ALOPECIA: Primary | ICD-10-CM

## 2025-06-11 RX ORDER — FLUOCINONIDE TOPICAL SOLUTION USP, 0.05% 0.5 MG/ML
SOLUTION TOPICAL
Qty: 60 ML | Refills: 9 | Status: SHIPPED | OUTPATIENT
Start: 2025-06-11

## 2025-06-11 NOTE — TELEPHONE ENCOUNTER
Last visit 4/11/25. Per note:    A/P:  1. Frontal fibrosing alopecia   Doing well, continue lidex solution three times weekly.      Refilled per specialty RN scope of practice.    Magy Brown, RN on 6/11/2025 at 10:37 AM

## 2025-06-11 NOTE — TELEPHONE ENCOUNTER
Requested Prescriptions   Pending Prescriptions Disp Refills    fluocinonide (LIDEX) 0.05 % external solution 60 mL 9     Sig: Apply twice daily as needed to frontal scalp.       There is no refill protocol information for this order          Last office visit: 4/11/2025 ; last virtual visit: Visit date not found with prescribing provider:  Talisha Aguilar      Future Office Visit:          Yue Morris   Clinic Station    [facility]  [phone]

## (undated) DEVICE — KIT PROCEDURE FLUENT IN/OUT FLOWPAK TISS TRAP FLT-112S

## (undated) DEVICE — GLOVE BIOGEL PI MICRO INDICATOR UNDERGLOVE SZ 6.0 48960

## (undated) DEVICE — CATH INTERMITTENT CLEAN-CATH FEMALE 14FR 6" VINYL LF 420614

## (undated) DEVICE — TUBING SUCTION 12"X1/4" N612

## (undated) DEVICE — STOCKING SLEEVE COMPRESSION CALF MED

## (undated) DEVICE — GOWN LG DISP 9515

## (undated) DEVICE — PAD PERI INDIV WRAP 11" 2022

## (undated) DEVICE — BLADE CLIPPER 4406

## (undated) DEVICE — Device

## (undated) DEVICE — SOL NACL 0.9% IRRIG 1000ML BOTTLE 07138-09

## (undated) DEVICE — SOL WATER IRRIG 1000ML BOTTLE 07139-09

## (undated) DEVICE — PACK LAPAROSCOPY/PELVISCOPY STD

## (undated) DEVICE — NDL SPINAL 22GA 3.5" QUINCKE 405181

## (undated) DEVICE — PAD FLOOR SURGISAFE

## (undated) DEVICE — DILATOR CERVICAL OS FINDER TAPER 2-4MMX21.5CM 1176

## (undated) DEVICE — GLOVE BIOGEL PI MICRO INDICATOR UNDERGLOVE SZ 6.5 48965

## (undated) DEVICE — SEAL SET MYOSURE ROD LENS SCOPE SINGLE USE 40-902

## (undated) DEVICE — DRSG TELFA 3X8" 1238

## (undated) DEVICE — SOL NACL 0.9% IRRIG 3000ML BAG 07972-08

## (undated) DEVICE — SUCTION MANIFOLD NEPTUNE 2 SYS 1 PORT 702-025-000

## (undated) DEVICE — DECANTER VIAL 2006S

## (undated) DEVICE — PREP CHLORHEXIDINE 4% 4OZ (HIBICLENS) 57504

## (undated) RX ORDER — FENTANYL CITRATE 50 UG/ML
INJECTION, SOLUTION INTRAMUSCULAR; INTRAVENOUS
Status: DISPENSED
Start: 2024-02-28

## (undated) RX ORDER — GABAPENTIN 300 MG/1
CAPSULE ORAL
Status: DISPENSED
Start: 2024-02-28

## (undated) RX ORDER — PROPOFOL 10 MG/ML
INJECTION, EMULSION INTRAVENOUS
Status: DISPENSED
Start: 2024-02-28

## (undated) RX ORDER — IBUPROFEN 400 MG/1
TABLET, FILM COATED ORAL
Status: DISPENSED
Start: 2024-02-28

## (undated) RX ORDER — ACETAMINOPHEN 325 MG/1
TABLET ORAL
Status: DISPENSED
Start: 2024-02-28

## (undated) RX ORDER — DEXAMETHASONE SODIUM PHOSPHATE 10 MG/ML
INJECTION, SOLUTION INTRAMUSCULAR; INTRAVENOUS
Status: DISPENSED
Start: 2024-02-28

## (undated) RX ORDER — ONDANSETRON 2 MG/ML
INJECTION INTRAMUSCULAR; INTRAVENOUS
Status: DISPENSED
Start: 2024-02-28

## (undated) RX ORDER — BUPIVACAINE HYDROCHLORIDE 2.5 MG/ML
INJECTION, SOLUTION EPIDURAL; INFILTRATION; INTRACAUDAL
Status: DISPENSED
Start: 2024-02-28